# Patient Record
Sex: FEMALE | Race: WHITE | NOT HISPANIC OR LATINO | Employment: FULL TIME | URBAN - NONMETROPOLITAN AREA
[De-identification: names, ages, dates, MRNs, and addresses within clinical notes are randomized per-mention and may not be internally consistent; named-entity substitution may affect disease eponyms.]

---

## 2018-10-19 ENCOUNTER — OFFICE VISIT (OUTPATIENT)
Dept: FAMILY MEDICINE | Facility: OTHER | Age: 43
End: 2018-10-19
Payer: COMMERCIAL

## 2018-10-19 VITALS
DIASTOLIC BLOOD PRESSURE: 80 MMHG | SYSTOLIC BLOOD PRESSURE: 136 MMHG | RESPIRATION RATE: 20 BRPM | TEMPERATURE: 96.1 F | WEIGHT: 293 LBS | HEART RATE: 72 BPM

## 2018-10-19 DIAGNOSIS — E11.9 TYPE 2 DIABETES MELLITUS WITHOUT COMPLICATION, WITHOUT LONG-TERM CURRENT USE OF INSULIN (H): Primary | ICD-10-CM

## 2018-10-19 DIAGNOSIS — Z23 NEED FOR PROPHYLACTIC VACCINATION AND INOCULATION AGAINST INFLUENZA: ICD-10-CM

## 2018-10-19 DIAGNOSIS — R20.0 NUMBNESS AND TINGLING OF FOOT: ICD-10-CM

## 2018-10-19 DIAGNOSIS — R20.2 NUMBNESS AND TINGLING OF FOOT: ICD-10-CM

## 2018-10-19 DIAGNOSIS — E66.01 MORBID OBESITY (H): ICD-10-CM

## 2018-10-19 LAB
ANION GAP SERPL CALCULATED.3IONS-SCNC: 9 MMOL/L (ref 3–14)
BASOPHILS # BLD AUTO: 0.1 10E9/L (ref 0–0.2)
BASOPHILS NFR BLD AUTO: 0.5 %
BUN SERPL-MCNC: 10 MG/DL (ref 7–30)
CALCIUM SERPL-MCNC: 9.4 MG/DL (ref 8.5–10.1)
CHLORIDE SERPL-SCNC: 103 MMOL/L (ref 94–109)
CHOLEST SERPL-MCNC: 149 MG/DL
CO2 SERPL-SCNC: 26 MMOL/L (ref 20–32)
CREAT SERPL-MCNC: 0.71 MG/DL (ref 0.52–1.04)
DIFFERENTIAL METHOD BLD: NORMAL
EOSINOPHIL # BLD AUTO: 0.5 10E9/L (ref 0–0.7)
EOSINOPHIL NFR BLD AUTO: 4.6 %
ERYTHROCYTE [DISTWIDTH] IN BLOOD BY AUTOMATED COUNT: 13.6 % (ref 10–15)
FOLATE SERPL-MCNC: 13 NG/ML
GFR SERPL CREATININE-BSD FRML MDRD: 89 ML/MIN/1.7M2
GLUCOSE SERPL-MCNC: 245 MG/DL (ref 70–99)
HBA1C MFR BLD: 10.4 % (ref 0–5.6)
HCT VFR BLD AUTO: 42.9 % (ref 35–47)
HDLC SERPL-MCNC: 42 MG/DL
HGB BLD-MCNC: 14 G/DL (ref 11.7–15.7)
LDLC SERPL CALC-MCNC: 73 MG/DL
LYMPHOCYTES # BLD AUTO: 2.9 10E9/L (ref 0.8–5.3)
LYMPHOCYTES NFR BLD AUTO: 27.4 %
MCH RBC QN AUTO: 28.4 PG (ref 26.5–33)
MCHC RBC AUTO-ENTMCNC: 32.6 G/DL (ref 31.5–36.5)
MCV RBC AUTO: 87 FL (ref 78–100)
MONOCYTES # BLD AUTO: 0.6 10E9/L (ref 0–1.3)
MONOCYTES NFR BLD AUTO: 5.3 %
NEUTROPHILS # BLD AUTO: 6.5 10E9/L (ref 1.6–8.3)
NEUTROPHILS NFR BLD AUTO: 62.2 %
NONHDLC SERPL-MCNC: 107 MG/DL
PLATELET # BLD AUTO: 399 10E9/L (ref 150–450)
POTASSIUM SERPL-SCNC: 3.8 MMOL/L (ref 3.4–5.3)
RBC # BLD AUTO: 4.93 10E12/L (ref 3.8–5.2)
SODIUM SERPL-SCNC: 138 MMOL/L (ref 133–144)
TRIGL SERPL-MCNC: 168 MG/DL
TSH SERPL DL<=0.005 MIU/L-ACNC: 2.61 MU/L (ref 0.4–4)
VIT B12 SERPL-MCNC: 258 PG/ML (ref 193–986)
WBC # BLD AUTO: 10.5 10E9/L (ref 4–11)

## 2018-10-19 PROCEDURE — 82746 ASSAY OF FOLIC ACID SERUM: CPT | Performed by: PHYSICIAN ASSISTANT

## 2018-10-19 PROCEDURE — 82607 VITAMIN B-12: CPT | Performed by: PHYSICIAN ASSISTANT

## 2018-10-19 PROCEDURE — 84443 ASSAY THYROID STIM HORMONE: CPT | Performed by: PHYSICIAN ASSISTANT

## 2018-10-19 PROCEDURE — 99204 OFFICE O/P NEW MOD 45 MIN: CPT | Mod: 25 | Performed by: PHYSICIAN ASSISTANT

## 2018-10-19 PROCEDURE — 83036 HEMOGLOBIN GLYCOSYLATED A1C: CPT | Performed by: PHYSICIAN ASSISTANT

## 2018-10-19 PROCEDURE — 36415 COLL VENOUS BLD VENIPUNCTURE: CPT | Performed by: PHYSICIAN ASSISTANT

## 2018-10-19 PROCEDURE — 90471 IMMUNIZATION ADMIN: CPT | Performed by: PHYSICIAN ASSISTANT

## 2018-10-19 PROCEDURE — 80048 BASIC METABOLIC PNL TOTAL CA: CPT | Performed by: PHYSICIAN ASSISTANT

## 2018-10-19 PROCEDURE — 80061 LIPID PANEL: CPT | Performed by: PHYSICIAN ASSISTANT

## 2018-10-19 PROCEDURE — 90686 IIV4 VACC NO PRSV 0.5 ML IM: CPT | Performed by: PHYSICIAN ASSISTANT

## 2018-10-19 PROCEDURE — 85025 COMPLETE CBC W/AUTO DIFF WBC: CPT | Performed by: PHYSICIAN ASSISTANT

## 2018-10-19 RX ORDER — METFORMIN HYDROCHLORIDE 500 MG/5ML
SOLUTION ORAL
COMMUNITY
End: 2018-10-19

## 2018-10-19 RX ORDER — METFORMIN HCL 500 MG
TABLET, EXTENDED RELEASE 24 HR ORAL
COMMUNITY
End: 2018-10-22

## 2018-10-19 RX ORDER — METHOCARBAMOL 500 MG/1
TABLET, FILM COATED ORAL 4 TIMES DAILY PRN
COMMUNITY
End: 2018-10-22

## 2018-10-19 RX ORDER — GLYBURIDE 2.5 MG/1
2.5 TABLET ORAL 2 TIMES DAILY WITH MEALS
COMMUNITY
End: 2018-11-16

## 2018-10-19 ASSESSMENT — PAIN SCALES - GENERAL: PAINLEVEL: MILD PAIN (2)

## 2018-10-19 NOTE — NURSING NOTE
Health Maintenance Due   Topic Date Due     PHQ-2 Q1 YR  12/29/1985     TETANUS IMMUNIZATION (SYSTEM ASSIGNED)  12/29/1991     HIV SCREEN (SYSTEM ASSIGNED)  12/29/1991     PAP SCREENING Q3 YR (SYSTEM ASSIGNED)  08/24/2004     INFLUENZA VACCINE (1) 09/01/2018     Domitila GEORGE LPN

## 2018-10-19 NOTE — MR AVS SNAPSHOT
After Visit Summary   10/19/2018    Brittney Moon    MRN: [id number]           Patient Information     Date Of Birth          1975        Visit Information        Provider Department      10/19/2018 3:40 PM Pelon Cotter PA-C [facility]        Today's Diagnoses     Type 2 diabetes mellitus without complication, without long-term current use of insulin (H)    -  1    Numbness and tingling of foot          Care Instructions      Diet: Diabetes  Food is an important tool that you can use to control diabetes and stay healthy. Eating well-balanced meals in the correct amounts will help you control your blood glucose levels and prevent low blood sugar reactions. It will also help you reduce the health risks of diabetes. There is no one specific diet that is right for everyone with diabetes. But there are general guidelines to follow. A registered dietitian (RD) will create a tailored diet approach that s just right for you. He or she will also help you plan healthy meals and snacks. If you have any questions, call your dietitian for advice.     Guidelines for success  Talk with your healthcare provider before starting a diabetes diet or weight loss program. If you haven't talked with a dietitian yet, ask your provider for a referral. The following guidelines can help you succeed:    Select foods from the 6 food groups below. Your dietitian will help you find food choices within each group. He or she will also show you serving sizes and how many servings you can have at each meal.  ? Grains, beans, and starchy vegetables  ? Vegetables  ? Fruit  ? Milk or yogurt  ? Meat, poultry, fish, or tofu  ? Healthy fats    Check your blood sugar levels as directed by your provider. Take any medicine as prescribed by your provider.    Learn to read food labels and pick the right portion sizes.    Eat only the amount of food in your meal plan. Eat about the same amount of food at regular times each  day. Don t skip meals. Eat meals 4 to 5 hours apart, with snacks in between.    Limit alcohol. It raises blood sugar levels. Drink water or calorie-free diet drinks that use safe sweeteners.    Eat less fat to help lower your risk of heart disease. Use nonfat or low-fat dairy products and lean meats. Avoid fried foods. Use cooking oils that are unsaturated, such as olive, canola, or peanut oil.    Talk with your dietitian about safe sugar substitutes.    Avoid added salt. It can contribute to high blood pressure, which can cause heart disease. People with diabetes already have a risk of high blood pressure and heart disease.    Stay at a healthy weight. If you need to lose weight, cut down on your portion sizes. But don t skip meals. Exercise is an important part of any weight management program. Talk with your provider about an exercise program that s right for you.    For more information about the best diet plan for you, talk with a registered dietitian (RD). To find an RD in your area, contact:  ? Academy of Nutrition and Dietetics www.eatright.org  ? The American Diabetes Association 068-825-2199 www.diabetes.org  Date Last Reviewed: 8/1/2016 2000-2017 [employer]. [address] [place] [address], [place] [address]. All rights reserved. This information is not intended as a substitute for professional medical care. Always follow your healthcare professional's instructions.        Eating Out When You Have Diabetes  Eating right is an important part of keeping your blood sugar in your target range. You just need to make healthy choices.    Tips for restaurant meals  When you eat away from home try these tips:    Try to schedule your dining-out meal at your normal meal time. Make a reservation if possible, so you don't have to wait to eat. If you can't make a reservation, try to arrive at the restaurant at a less-busy time to cut down your wait time. Eat a small fruit or starch snack at your regular  mealtime if your restaurant meal is going to be later than usual.     Call ahead to see if the restaurant can meet your dietary needs if you've never been there before. Or you can go online to see the menu ahead of time.    Carry some crackers with you in case the restaurant needs you to wait until you can be served.    Ask how foods are prepared before you order.    Instead of fried, sautéed, or breaded foods, choose ones that are broiled, steamed, grilled, or baked.    Ask for sauces, gravies, and dressings on the side.    Only eat an amount that fits your meal plan. Remember: You can take home the leftovers.    Save dessert for special occasions. Then choose a small dessert or share one with a friend or family member.  Make healthy choices  Fast food    Garden salad with light dressing on the side    Baked potato with vegetables or herbs    Broiled, roasted, or grilled chicken sandwich    Sliced turkey or lean roast beef sandwich  Mexican    Chicken enchilada, without cheese or sour cream     Small burrito with whole beans and chicken    Whole beans (not refried) and rice    Chicken or fish fajitas  Steakhouse    Grilled or broiled lean cuts of beef    Baked potato with vegetables or herbs    Broiled or baked chicken. Don t eat the skin.    Steamed vegetables  Asian    Steamed dumplings or potstickers    Broiled, boiled, or steamed meats or fish    Sushi or sashimi    Steamed rice or boiled noodles. One serving is equal to 1/3 cup.  Date Last Reviewed: 6/1/2016 2000-2017 [employer] [address] [place] [address], [place] [address]. All rights reserved. This information is not intended as a substitute for professional medical care. Always follow your healthcare professional's instructions.        Healthy Meals for Diabetes     A healthcare provider will help you develop a meal plan that fits your needs.   Ask your healthcare team to help you make a meal plan that fits your needs. Your meal plan tells  you when to eat your meals and snacks, what kinds of foods to eat, and how much of each food to eat. You don t have to give up all the foods you like. But you do need to follow some guidelines.  Choose healthy carbohydrates  Starches, sugars, and fiber are all types of carbohydrates. Fiber can help lower your cholesterol and triglycerides. Fiber is also healthy for your heart. You should have 20 to 35 grams of total fiber each day. Fiber-rich foods include:    Whole-grain breads and cereals    Bulgur wheat    Brown rice       Whole-wheat pasta    Fruits and vegetables    Dry beans, and peas   Keep track of the amount of carbohydrates you eat. This can help you keep the right balance of physical activity and medicine. The amount of carbohydrates needed will vary for each person. It depends on many things such as your health, the medicines you take, and how active you are. Your healthcare team will help you figure out the right amount of carbohydrates for you. You may start with around 45 to 60 grams of carbohydrates per meal, depending on your situation.   Here are some examples of foods containing about 15 grams of carbohydrates (1 serving of carbohydrates):    1/2 cup of canned or frozen fruit    A small piece of fresh fruit (4 ounces)    1 slice of bread    1/2 cup of oatmeal    1/3 cup of rice    4 to 6 crackers    1/2 English muffin    1/2 cup of black beans    1/4 of a large baked potato (3 ounces)    2/3 cup of plain fat-free yogurt    1 cup of soup    1/2 cup of casserole    6 chicken nuggets    2-inch-square brownie or cake without frosting    2 small cookies    1/2 cup of ice cream or sherbet  Choose healthy protein foods  Eating protein that is low in fat can help you control your weight. It also helps keep your heart healthy. Low-fat protein foods include:    Fish    Plant proteins, such as dry beans and peas, nuts, and soy products like tofu and soymilk    Lean meat with all visible fat  removed    Poultry with the skin removed    Low-fat or nonfat milk, cheese, and yogurt  Limit unhealthy fats and sugar  Saturated and trans fats are unhealthy for your heart. They raise LDL (bad) cholesterol. Fat is also high in calories, so it can make you gain weight. To cut down on unhealthy fats and sugar, limit these foods:    Butter or margarine    Palm and palm kernel oils and coconut oil    Cream    Cheese    Yip    Lunch meats       Ice cream    Sweet bakery goods such as pies, muffins, and donuts    Jams and jellies    Candy bars    Regular sodas   How much to eat  The amount of food you eat affects your blood sugar. It also affects your weight. Your healthcare team will tell you how much of each type of food you should eat.    Use measuring cups and spoons and a food scale to measure serving sizes.    Learn what a correct serving size looks like on your plate. This will help when you are away from home and can t measure your servings.    Eat only the number of servings given on your meal plan for each food. Don t take seconds.    Learn to read food labels. Be sure to look at serving size, total carbohydrates, fiber, calories, sugar, and saturated and trans fats. Look for healthier alternatives to foods that have added sugar.    Plan ahead for parties so you can still have a good time without going overboard with unhealthy food choices. Set a good example yourself by bringing a healthy dish to pot lucks.   Choose healthy snacks  When it comes to snacks, we usually think about foods with added sugar and fats. But there are many other options for healthier snack choices. Here are a few snack ideas to choose from:  Snacks with less than 5 grams of carbohydrates    1 piece of string cheese    3 celery sticks plus 1 tablespoon of peanut butter    5 cherry tomatoes plus 1 tablespoon of ranch dressing    1 hard-boiled egg    1/4 cup of fresh blueberries     5 baby carrots    1 cup of light popcorn    1/2 cup of  sugar-free gelatin    15 almonds  Snacks with about 10 to 20 grams of carbohydrates    1/3 cup of hummus plus 1 cup of fresh cut nonstarchy vegetables (carrots, green peppers, broccoli, celery, or a combination)    1/2 cup of fresh or canned fruit plus 1/4 cup of cottage cheese    1/2 cup of tuna salad with 4 crackers    2 rice cakes and a tablespoon of peanut butter    1 small apple or orange    3 cups light popcorn    1/2 of a turkey sandwich (1 slice of whole-wheat bread, 2 ounces of turkey, and mustard)  Portion sizes are important to controlling your blood sugar and staying at a healthy weight. Stock up on healthy snack items so you always have them on hand.  When to eat  Your meal plan will likely include breakfast, lunch, dinner, and some snacks.    Try to eat your meals and snacks at about the same times each day.    Eat all your meals and snacks. Skipping a meal or snack can make your blood sugar drop too low. It can also cause you to eat too much at the next meal or snack. Then your blood sugar could get too high.  Date Last Reviewed: 7/1/2016 2000-2017 [employer]. [address] [place] [address], [place] [address]. All rights reserved. This information is not intended as a substitute for professional medical care. Always follow your healthcare professional's instructions.        Understanding Carbohydrates    A car needs the right type of fuel to run. And you need the right kind of food to function. To keep your energy level up, your body needs food that has carbohydrates. But carbohydrates raise blood sugar levels higher and faster than other kinds of food. Your dietitian will work with you to figure out the amount of carbohydrates you need.  Starches  Starches are found in grains, some vegetables, and beans. Grain products include bread, pasta, cereal, and tortillas. Starchy vegetables include potatoes, peas, corn, lima beans, yams, and squash. Kidney beans, handley beans, black beans, garbanzo  beans, and lentils also contain starches.  Sugars  Sugars are found naturally in many foods. Or sugar can be added. Foods that contain natural sugar include fruits and fruit juices, dairy products, honey, and molasses. Added sugars are found in most desserts, processed foods, candy, regular soda, and fruit drinks. These are very helpful for treating low blood sugar, or hypoglycemia. They provide sugar quickly. Try to keep at least 15 to 20 grams of these simple sugars with you at all times. Eat this if you begin to have low blood sugar symptoms.  Fiber  Fiber comes from plant foods. Most fiber isn t digested by the body. Instead of raising blood sugar levels like other carbohydrates, it actually stops blood sugar from rising too fast. Fiber is found in fruits, vegetables, whole grains, beans, peas, and many nuts.  Carb counting  Keep track of the amount of carbohydrates you eat. This can help you keep the right balance of physical activity and medicine. The amount of carbohydrates needed will vary for each person. It depends on many things such as your health, the medicines you take, and how active you are. Your healthcare team will help you figure out the right amount of carbohydrates for you. You may start with around 45 to 60 grams of carbohydrate per meal, depending on your situation. Carb counting is a system that helps you keep track of the carbohydrates you eat at each meal.  Carbohydrates come from a variety of foods. These include grains, starchy vegetables, fruit, milk, beans, and snack foods. You can either count carbohydrate grams or carbohydrate servings. When you count carbohydrate servings, 1 carbohydrate serving = 15 grams of carbohydrates.  Here are some examples of foods containing about 15 grams of carbohydrates (1 serving of carbohydrates):    1/2 cup of canned or frozen fruit    A small piece of fresh fruit (4 ounces)    1 slice of bread    1/2 cup of oatmeal    1/3 cup of rice    4 to 6  crackers    1/2 English muffin    1/2 cup of black beans    1/4 of a large baked potato (3 ounces)    2/3 cup of plain fat-free yogurt    1 cup of soup    1/2 cup of casserole    6 chicken nuggets    2-inch-square brownie or cake without frosting    2 small cookies    1/2 cup of ice cream or sherbet  Carb counting is easier when food labels are available. Look at the label to see how many grams of total carbohydrates the food contains. Then you can figure out how much you should eat.  Two very important lines to look at on the label are the serving size and the total carbohydrate amount. Here are some tips for using food labels to count your carbohydrate intake:    Check the serving size. The information on the label is based on that serving size. If you eat more than the listed serving size, you may have to double or triple the other information on the label.     Check the total grams of carbohydrates. Total carbohydrate from the label includes sugar, starch, and fiber. Be sure to use the total carbohydrate number and not sugar alone.    Know how many grams of carbohydrates you can have.  Be familiar with the matching portion sizes.    Compare labels. Compare the labels of different products, looking at serving sizes and total carbohydrates to find the products that work best for you.     Don't forget protein and fat. With all the focus on carb counting, it might be easy to forget protein and fat in your meals. Don't forget to include sources of protein and healthy fat to balance your meals.  It s also important to be consistent with the amount and time you eat when taking a fixed dose of diabetes medicine. Work with your healthcare provider or dietitian if you need additional help. He or she can help you keep track of your carbohydrate intake. He or she can also help you figure out how many grams of carbohydrates you should have.  Date Last Reviewed: 7/1/2016 2000-2017 [employer]. [address] [place] [address]  "[place] [address]. All rights reserved. This information is not intended as a substitute for professional medical care. Always follow your healthcare professional's instructions.                Follow-ups after your visit        Who to contact     If you have questions or need follow up information about today's clinic visit or your schedule please contact [facility] directly at [phone].  Normal or non-critical lab and imaging results will be communicated to you by MyChart, letter or phone within 4 business days after the clinic has received the results. If you do not hear from us within 7 days, please contact the clinic through [facility] or phone. If you have a critical or abnormal lab result, we will notify you by phone as soon as possible.  Submit refill requests through [facility] or call your pharmacy and they will forward the refill request to us. Please allow 3 business days for your refill to be completed.          Additional Information About Your Visit        MyChart Information     [facility] lets you send messages to your doctor, view your test results, renew your prescriptions, schedule appointments and more. To sign up, go to [email or web address] . Click on \"Log in\" on the left side of the screen, which will take you to the Welcome page. Then click on \"Sign up Now\" on the right side of the page.     You will be asked to enter the access code listed below, as well as some personal information. Please follow the directions to create your username and password.     Your access code is: [email or web address]  Expires: 2019  3:33 PM     Your access code will  in 90 days. If you need help or a new code, please call your [facility] or [phone].        Care EveryWhere ID     This is your Care EveryWhere ID. This could be used by other organizations to access your [facility] medical records  [id number]        Your Vitals Were     Pulse Temperature Respirations Last Period    "       72 96.1  F (35.6  C) (Oral) 20 04/23/2018         Blood Pressure from Last 3 Encounters:   10/19/18 136/80   08/08/01 116/78   03/29/01 146/88    Weight from Last 3 Encounters:   10/19/18 317 lb 3.2 oz (143.9 kg)   08/08/01 260 lb (117.9 kg)   03/29/01 240 lb (108.9 kg)              We Performed the Following     Basic metabolic panel  (Ca, Cl, CO2, Creat, Gluc, K, Na, BUN)     CBC with platelets differential     Folate     Hemoglobin A1c     Lipid Profile     TSH with free T4 reflex     Vitamin B12        Primary Care Provider Fax #    Physician No Ref-Primary [phone]       No address on file        Equal Access to Services     JER BYERS : Cheryl Camarena, da gomez, jason kaalmamoises kirkpatrick, darshan edgar. So [facility] [phone].    ATENCIÓN: Si habla español, tiene a zacarias disposición servicios gratuitos de asistencia lingüística. Llame al [phone].    We comply with applicable federal civil rights laws and [place] laws. We do not discriminate on the basis of race, color, national origin, age, disability, sex, sexual orientation, or gender identity.            Thank you!     Thank you for choosing [facility]  for your care. Our goal is always to provide you with excellent care. Hearing back from our patients is one way we can continue to improve our services. Please take a few minutes to complete the written survey that you may receive in the mail after your visit with us. Thank you!             Your Updated Medication List - Protect others around you: Learn how to safely use, store and throw away your medicines at www.disposemymeds.org.          This list is accurate as of 10/19/18  4:28 PM.  Always use your most recent med list.                   Brand Name Dispense Instructions for use Diagnosis    glyBURIDE 2.5 MG tablet    DIABETA /MICRONASE     Take 2.5 mg by mouth 2 times daily (with meals)    Type 2 diabetes mellitus without  complication, without long-term current use of insulin (H)       metFORMIN 500 MG 24 hr tablet    GLUCOPHAGE-XR     2 tablets twice daily    Type 2 diabetes mellitus without complication, without long-term current use of insulin (H)       methocarbamol 500 MG tablet    ROBAXIN     Take by mouth 4 times daily as needed for muscle spasms 1-3 tablets daily    Type 2 diabetes mellitus without complication, without long-term current use of insulin (H)

## 2018-10-19 NOTE — PROGRESS NOTES
SUBJECTIVE:   Brittney Moon is a 42 year old female who presents to clinic today for the following health issues:      New Patient/Transfer of Care    Patient is a 42 year old female who presents to [facility]. She was previously living in [place], but moved back to the area to be closer to family. 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 She has worn prosthetic in the past with difficulty, citing pressure sores as the reason she is not currently using one. Patient is in motorized wheel chair presently. She is currently being treated for T2DM and muscle pain from sitting in a chair. Patient is obese and recognizes the importance of reducing weight. She says that her mother and sister are both very supportive of helping her improve her diet. She lives with her uncle who is very resistant to eating vegetables which impacts her diet. We discussed making meals for herself and storing leftovers. Patient is experiencing numbness/tingling in the top of her right foot. It is unclear as to whether this is from sitting in a chair or due to polyneuropathy from the diabetes. We will update labs today.     Problem list and histories reviewed & adjusted, as indicated.  Additional history: as documented    There is no problem list on file for this patient.    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 No pertinent family history.      Current Outpatient Prescriptions   Medication Sig Dispense Refill     glyBURIDE (DIABETA /MICRONASE) 2.5 MG tablet Take 2.5 mg by mouth 2 times daily (with meals)       metFORMIN (GLUCOPHAGE-XR) 500 MG 24 hr tablet 2 tablets twice daily       methocarbamol (ROBAXIN) 500 MG  tablet Take by mouth 4 times daily as needed for muscle spasms 1-3 tablets daily       Allergies   Allergen Reactions     Codeine      Labs reviewed in EPIC    Reviewed and updated as needed this visit by clinical staff  Tobacco  Allergies  Meds  Med Hx  Surg Hx  Fam Hx  Soc Hx      Reviewed and updated as needed this visit by Provider         ROS:  Constitutional, HEENT, cardiovascular, pulmonary, gi and gu systems are negative, except as otherwise noted.    OBJECTIVE:     /80 (BP Location: Right arm, Patient Position: Chair, Cuff Size: Adult Large)  Pulse 72  Temp 96.1  F (35.6  C) (Oral)  Resp 20  Wt 317 lb 3.2 oz (143.9 kg)  LMP 04/23/2018  There is no height or weight on file to calculate BMI.  GENERAL: healthy, alert and no distress  RESP: lungs clear to auscultation - no rales, rhonchi or wheezes  CV: regular rate and rhythm, normal S1 S2, no S3 or S4, no murmur, click or rub, no peripheral edema and peripheral pulses strong  ABDOMEN: obese, nontender, no hepatosplenomegaly, no masses and bowel sounds normal  MS: no gross musculoskeletal defects noted, no edema, left leg amputated  SKIN: no suspicious lesions or rashes  NEURO: Normal strength and tone, mentation intact and speech normal, unable to perceive sharp vs. Dull along toes of right foot  PSYCH: mentation appears normal, affect normal/bright    Diagnostic Test Results:  Results for orders placed or performed in visit on 10/19/18   CBC with platelets differential   Result Value Ref Range    WBC 10.5 4.0 - 11.0 10e9/L    RBC Count 4.93 3.8 - 5.2 10e12/L    Hemoglobin 14.0 11.7 - 15.7 g/dL    Hematocrit 42.9 35.0 - 47.0 %    MCV 87 78 - 100 fl    MCH 28.4 26.5 - 33.0 pg    MCHC 32.6 31.5 - 36.5 g/dL    RDW 13.6 10.0 - 15.0 %    Platelet Count 399 150 - 450 10e9/L    % Neutrophils 62.2 %    % Lymphocytes 27.4 %    % Monocytes 5.3 %    % Eosinophils 4.6 %    % Basophils 0.5 %    Absolute Neutrophil 6.5 1.6 - 8.3 10e9/L    Absolute  Lymphocytes 2.9 0.8 - 5.3 10e9/L    Absolute Monocytes 0.6 0.0 - 1.3 10e9/L    Absolute Eosinophils 0.5 0.0 - 0.7 10e9/L    Absolute Basophils 0.1 0.0 - 0.2 10e9/L    Diff Method Automated Method    Vitamin B12   Result Value Ref Range    Vitamin B12 258 193 - 986 pg/mL   Folate   Result Value Ref Range    Folate 13.0 >5.4 ng/mL   TSH with free T4 reflex   Result Value Ref Range    TSH 2.61 0.40 - 4.00 mU/L   Hemoglobin A1c   Result Value Ref Range    Hemoglobin A1C 10.4 (H) 0 - 5.6 %   Basic metabolic panel  (Ca, Cl, CO2, Creat, Gluc, K, Na, BUN)   Result Value Ref Range    Sodium 138 133 - 144 mmol/L    Potassium 3.8 3.4 - 5.3 mmol/L    Chloride 103 94 - 109 mmol/L    Carbon Dioxide 26 20 - 32 mmol/L    Anion Gap 9 3 - 14 mmol/L    Glucose 245 (H) 70 - 99 mg/dL    Urea Nitrogen 10 7 - 30 mg/dL    Creatinine 0.71 0.52 - 1.04 mg/dL    GFR Estimate 89 >60 mL/min/1.7m2    GFR Estimate If Black >90 >60 mL/min/1.7m2    Calcium 9.4 8.5 - 10.1 mg/dL   Lipid Profile   Result Value Ref Range    Cholesterol 149 <200 mg/dL    Triglycerides 168 (H) <150 mg/dL    HDL Cholesterol 42 (L) >49 mg/dL    LDL Cholesterol Calculated 73 <100 mg/dL    Non HDL Cholesterol 107 <130 mg/dL       ASSESSMENT/PLAN:       1. Type 2 diabetes mellitus without complication, without long-term current use of insulin (H)  Patient informed of elevated A1c. Referral for diabetic education placed. Increase Glyburide from 2.5mg twice daily to 5mg twice daily. Patient should follow up in 3 months for recheck.   - methocarbamol (ROBAXIN) 500 MG tablet; Take by mouth 4 times daily as needed for muscle spasms 1-3 tablets daily  - glyBURIDE (DIABETA /MICRONASE) 2.5 MG tablet; Take 2.5 mg by mouth 2 times daily (with meals)  - metFORMIN (GLUCOPHAGE-XR) 500 MG 24 hr tablet; 2 tablets twice daily  - CBC with platelets differential  - Vitamin B12  - Folate  - TSH with free T4 reflex  - Hemoglobin A1c  - Basic metabolic panel  (Ca, Cl, CO2, Creat, Gluc, K, Na,  BUN)  - Lipid Profile  - blood glucose monitoring (NO BRAND SPECIFIED) meter device kit; Use to test blood sugar 1-2 times daily or as directed.  Dispense: 1 kit; Refill: 0  - blood glucose monitoring (NO BRAND SPECIFIED) test strip; Use to test blood sugar 1-2 times daily or as directed. To accompany: Blood Glucose Monitor Brands: per insurance.  Dispense: 100 strip; Refill: 6  - blood glucose calibration (NO BRAND SPECIFIED) solution; Use to calibrate blood glucose monitor as needed as directed. To accompany: Blood Glucose Monitor Brands: per insurance.  Dispense: 1 Bottle; Refill: 3  - thin (NO BRAND SPECIFIED) lancets; Use to test blood sugar 1-2 times daily or as directed. To accompany: Blood Glucose Monitor Brands: per insurance.  Dispense: 100 each; Refill: 1  - alcohol swab prep pads; Use to swab area of injection/tez as directed  Dispense: 100 each; Refill: 3  - DIABETES EDUCATOR REFERRAL  - glyBURIDE (DIABETA /MICRONASE) 5 MG tablet; Take 1 tablet (5 mg) by mouth 2 times daily (with meals)  Dispense: 90 tablet; Refill: 1    2. Numbness and tingling of foot  Suspect that the tingling is due to neuropathy from poorly controlled diabetes. We will check labs and notify the patient of the results.   - Vitamin B12  - Folate    3. Morbid obesity (H)  Discussed with patient consideration for pool exercise or PT to help manage weight. Diabetic education referral placed to help patient better understand diet.     4. Need for prophylactic vaccination and inoculation against influenza  Given without issue. Information sent home with patient.   - Vaccine Administration, Initial [38172]  - FLU VACCINE, SPLIT VIRUS, IM (QUADRIVALENT) [47999]- >3 YRS    Follow up with clinic 3 months or sooner if conditions change, worsen or fail to improve as expected.      Pelon Cotter PA-C  [facility]      Injectable Influenza Immunization Documentation    1.  Is the person to be vaccinated sick today?   No    2.  Does the person to be vaccinated have an allergy to a component   of the vaccine?   No  Egg Allergy Algorithm Link    3. Has the person to be vaccinated ever had a serious reaction   to influenza vaccine in the past?   No    4. Has the person to be vaccinated ever had Guillain-Barré syndrome?   No    Form completed by Domitila GEORGE LPN

## 2018-10-19 NOTE — PATIENT INSTRUCTIONS
Diet: Diabetes  Food is an important tool that you can use to control diabetes and stay healthy. Eating well-balanced meals in the correct amounts will help you control your blood glucose levels and prevent low blood sugar reactions. It will also help you reduce the health risks of diabetes. There is no one specific diet that is right for everyone with diabetes. But there are general guidelines to follow. A registered dietitian (RD) will create a tailored diet approach that s just right for you. He or she will also help you plan healthy meals and snacks. If you have any questions, call your dietitian for advice.     Guidelines for success  Talk with your healthcare provider before starting a diabetes diet or weight loss program. If you haven't talked with a dietitian yet, ask your provider for a referral. The following guidelines can help you succeed:    Select foods from the 6 food groups below. Your dietitian will help you find food choices within each group. He or she will also show you serving sizes and how many servings you can have at each meal.  ? Grains, beans, and starchy vegetables  ? Vegetables  ? Fruit  ? Milk or yogurt  ? Meat, poultry, fish, or tofu  ? Healthy fats    Check your blood sugar levels as directed by your provider. Take any medicine as prescribed by your provider.    Learn to read food labels and pick the right portion sizes.    Eat only the amount of food in your meal plan. Eat about the same amount of food at regular times each day. Don t skip meals. Eat meals 4 to 5 hours apart, with snacks in between.    Limit alcohol. It raises blood sugar levels. Drink water or calorie-free diet drinks that use safe sweeteners.    Eat less fat to help lower your risk of heart disease. Use nonfat or low-fat dairy products and lean meats. Avoid fried foods. Use cooking oils that are unsaturated, such as olive, canola, or peanut oil.    Talk with your dietitian about safe sugar substitutes.    Avoid  added salt. It can contribute to high blood pressure, which can cause heart disease. People with diabetes already have a risk of high blood pressure and heart disease.    Stay at a healthy weight. If you need to lose weight, cut down on your portion sizes. But don t skip meals. Exercise is an important part of any weight management program. Talk with your provider about an exercise program that s right for you.    For more information about the best diet plan for you, talk with a registered dietitian (RD). To find an RD in your area, contact:  ? Academy of Nutrition and Dietetics www.eatright.org  ? The American Diabetes Association 954-420-2474 www.diabetes.org  Date Last Reviewed: 8/1/2016 2000-2017 [employer]. [address] [place] [address], [place] [address]. All rights reserved. This information is not intended as a substitute for professional medical care. Always follow your healthcare professional's instructions.        Eating Out When You Have Diabetes  Eating right is an important part of keeping your blood sugar in your target range. You just need to make healthy choices.    Tips for restaurant meals  When you eat away from home try these tips:    Try to schedule your dining-out meal at your normal meal time. Make a reservation if possible, so you don't have to wait to eat. If you can't make a reservation, try to arrive at the restaurant at a less-busy time to cut down your wait time. Eat a small fruit or starch snack at your regular mealtime if your restaurant meal is going to be later than usual.     Call ahead to see if the restaurant can meet your dietary needs if you've never been there before. Or you can go online to see the menu ahead of time.    Carry some crackers with you in case the restaurant needs you to wait until you can be served.    Ask how foods are prepared before you order.    Instead of fried, sautéed, or breaded foods, choose ones that are broiled, steamed, grilled, or  baked.    Ask for sauces, gravies, and dressings on the side.    Only eat an amount that fits your meal plan. Remember: You can take home the leftovers.    Save dessert for special occasions. Then choose a small dessert or share one with a friend or family member.  Make healthy choices  Fast food    Garden salad with light dressing on the side    Baked potato with vegetables or herbs    Broiled, roasted, or grilled chicken sandwich    Sliced turkey or lean roast beef sandwich  Mexican    Chicken enchilada, without cheese or sour cream     Small burrito with whole beans and chicken    Whole beans (not refried) and rice    Chicken or fish fajitas  Steakhouse    Grilled or broiled lean cuts of beef    Baked potato with vegetables or herbs    Broiled or baked chicken. Don t eat the skin.    Steamed vegetables  Asian    Steamed dumplings or potstickers    Broiled, boiled, or steamed meats or fish    Sushi or sashimi    Steamed rice or boiled noodles. One serving is equal to 1/3 cup.  Date Last Reviewed: 6/1/2016 2000-2017 [employer]. [address] [place] [address]. All rights reserved. This information is not intended as a substitute for professional medical care. Always follow your healthcare professional's instructions.        Healthy Meals for Diabetes     A healthcare provider will help you develop a meal plan that fits your needs.   Ask your healthcare team to help you make a meal plan that fits your needs. Your meal plan tells you when to eat your meals and snacks, what kinds of foods to eat, and how much of each food to eat. You don t have to give up all the foods you like. But you do need to follow some guidelines.  Choose healthy carbohydrates  Starches, sugars, and fiber are all types of carbohydrates. Fiber can help lower your cholesterol and triglycerides. Fiber is also healthy for your heart. You should have 20 to 35 grams of total fiber each day. Fiber-rich foods include:     Whole-grain breads and cereals    Bulgur wheat    Brown rice       Whole-wheat pasta    Fruits and vegetables    Dry beans, and peas   Keep track of the amount of carbohydrates you eat. This can help you keep the right balance of physical activity and medicine. The amount of carbohydrates needed will vary for each person. It depends on many things such as your health, the medicines you take, and how active you are. Your healthcare team will help you figure out the right amount of carbohydrates for you. You may start with around 45 to 60 grams of carbohydrates per meal, depending on your situation.   Here are some examples of foods containing about 15 grams of carbohydrates (1 serving of carbohydrates):    1/2 cup of canned or frozen fruit    A small piece of fresh fruit (4 ounces)    1 slice of bread    1/2 cup of oatmeal    1/3 cup of rice    4 to 6 crackers    1/2 English muffin    1/2 cup of black beans    1/4 of a large baked potato (3 ounces)    2/3 cup of plain fat-free yogurt    1 cup of soup    1/2 cup of casserole    6 chicken nuggets    2-inch-square brownie or cake without frosting    2 small cookies    1/2 cup of ice cream or sherbet  Choose healthy protein foods  Eating protein that is low in fat can help you control your weight. It also helps keep your heart healthy. Low-fat protein foods include:    Fish    Plant proteins, such as dry beans and peas, nuts, and soy products like tofu and soymilk    Lean meat with all visible fat removed    Poultry with the skin removed    Low-fat or nonfat milk, cheese, and yogurt  Limit unhealthy fats and sugar  Saturated and trans fats are unhealthy for your heart. They raise LDL (bad) cholesterol. Fat is also high in calories, so it can make you gain weight. To cut down on unhealthy fats and sugar, limit these foods:    Butter or margarine    Palm and palm kernel oils and coconut oil    Cream    Cheese    Yip    Lunch meats       Ice cream    Sweet bakery goods  such as pies, muffins, and donuts    Jams and jellies    Candy bars    Regular sodas   How much to eat  The amount of food you eat affects your blood sugar. It also affects your weight. Your healthcare team will tell you how much of each type of food you should eat.    Use measuring cups and spoons and a food scale to measure serving sizes.    Learn what a correct serving size looks like on your plate. This will help when you are away from home and can t measure your servings.    Eat only the number of servings given on your meal plan for each food. Don t take seconds.    Learn to read food labels. Be sure to look at serving size, total carbohydrates, fiber, calories, sugar, and saturated and trans fats. Look for healthier alternatives to foods that have added sugar.    Plan ahead for parties so you can still have a good time without going overboard with unhealthy food choices. Set a good example yourself by bringing a healthy dish to pot lucks.   Choose healthy snacks  When it comes to snacks, we usually think about foods with added sugar and fats. But there are many other options for healthier snack choices. Here are a few snack ideas to choose from:  Snacks with less than 5 grams of carbohydrates    1 piece of string cheese    3 celery sticks plus 1 tablespoon of peanut butter    5 cherry tomatoes plus 1 tablespoon of ranch dressing    1 hard-boiled egg    1/4 cup of fresh blueberries     5 baby carrots    1 cup of light popcorn    1/2 cup of sugar-free gelatin    15 almonds  Snacks with about 10 to 20 grams of carbohydrates    1/3 cup of hummus plus 1 cup of fresh cut nonstarchy vegetables (carrots, green peppers, broccoli, celery, or a combination)    1/2 cup of fresh or canned fruit plus 1/4 cup of cottage cheese    1/2 cup of tuna salad with 4 crackers    2 rice cakes and a tablespoon of peanut butter    1 small apple or orange    3 cups light popcorn    1/2 of a turkey sandwich (1 slice of whole-wheat bread,  2 ounces of turkey, and mustard)  Portion sizes are important to controlling your blood sugar and staying at a healthy weight. Stock up on healthy snack items so you always have them on hand.  When to eat  Your meal plan will likely include breakfast, lunch, dinner, and some snacks.    Try to eat your meals and snacks at about the same times each day.    Eat all your meals and snacks. Skipping a meal or snack can make your blood sugar drop too low. It can also cause you to eat too much at the next meal or snack. Then your blood sugar could get too high.  Date Last Reviewed: 7/1/2016 2000-2017 [employer]. [address] [place] [address], [place] [address]. All rights reserved. This information is not intended as a substitute for professional medical care. Always follow your healthcare professional's instructions.        Understanding Carbohydrates    A car needs the right type of fuel to run. And you need the right kind of food to function. To keep your energy level up, your body needs food that has carbohydrates. But carbohydrates raise blood sugar levels higher and faster than other kinds of food. Your dietitian will work with you to figure out the amount of carbohydrates you need.  Starches  Starches are found in grains, some vegetables, and beans. Grain products include bread, pasta, cereal, and tortillas. Starchy vegetables include potatoes, peas, corn, lima beans, yams, and squash. Kidney beans, handley beans, black beans, garbanzo beans, and lentils also contain starches.  Sugars  Sugars are found naturally in many foods. Or sugar can be added. Foods that contain natural sugar include fruits and fruit juices, dairy products, honey, and molasses. Added sugars are found in most desserts, processed foods, candy, regular soda, and fruit drinks. These are very helpful for treating low blood sugar, or hypoglycemia. They provide sugar quickly. Try to keep at least 15 to 20 grams of these simple sugars with you  at all times. Eat this if you begin to have low blood sugar symptoms.  Fiber  Fiber comes from plant foods. Most fiber isn t digested by the body. Instead of raising blood sugar levels like other carbohydrates, it actually stops blood sugar from rising too fast. Fiber is found in fruits, vegetables, whole grains, beans, peas, and many nuts.  Carb counting  Keep track of the amount of carbohydrates you eat. This can help you keep the right balance of physical activity and medicine. The amount of carbohydrates needed will vary for each person. It depends on many things such as your health, the medicines you take, and how active you are. Your healthcare team will help you figure out the right amount of carbohydrates for you. You may start with around 45 to 60 grams of carbohydrate per meal, depending on your situation. Carb counting is a system that helps you keep track of the carbohydrates you eat at each meal.  Carbohydrates come from a variety of foods. These include grains, starchy vegetables, fruit, milk, beans, and snack foods. You can either count carbohydrate grams or carbohydrate servings. When you count carbohydrate servings, 1 carbohydrate serving = 15 grams of carbohydrates.  Here are some examples of foods containing about 15 grams of carbohydrates (1 serving of carbohydrates):    1/2 cup of canned or frozen fruit    A small piece of fresh fruit (4 ounces)    1 slice of bread    1/2 cup of oatmeal    1/3 cup of rice    4 to 6 crackers    1/2 English muffin    1/2 cup of black beans    1/4 of a large baked potato (3 ounces)    2/3 cup of plain fat-free yogurt    1 cup of soup    1/2 cup of casserole    6 chicken nuggets    2-inch-square brownie or cake without frosting    2 small cookies    1/2 cup of ice cream or sherbet  Carb counting is easier when food labels are available. Look at the label to see how many grams of total carbohydrates the food contains. Then you can figure out how much you should eat.   Two very important lines to look at on the label are the serving size and the total carbohydrate amount. Here are some tips for using food labels to count your carbohydrate intake:    Check the serving size. The information on the label is based on that serving size. If you eat more than the listed serving size, you may have to double or triple the other information on the label.     Check the total grams of carbohydrates. Total carbohydrate from the label includes sugar, starch, and fiber. Be sure to use the total carbohydrate number and not sugar alone.    Know how many grams of carbohydrates you can have.  Be familiar with the matching portion sizes.    Compare labels. Compare the labels of different products, looking at serving sizes and total carbohydrates to find the products that work best for you.     Don't forget protein and fat. With all the focus on carb counting, it might be easy to forget protein and fat in your meals. Don't forget to include sources of protein and healthy fat to balance your meals.  It s also important to be consistent with the amount and time you eat when taking a fixed dose of diabetes medicine. Work with your healthcare provider or dietitian if you need additional help. He or she can help you keep track of your carbohydrate intake. He or she can also help you figure out how many grams of carbohydrates you should have.  Date Last Reviewed: 7/1/2016 2000-2017 [employer]. [address] [place] [address], [place] [address]. All rights reserved. This information is not intended as a substitute for professional medical care. Always follow your healthcare professional's instructions.

## 2018-10-21 PROBLEM — E11.9 TYPE 2 DIABETES MELLITUS WITHOUT COMPLICATION, WITHOUT LONG-TERM CURRENT USE OF INSULIN (H): Status: ACTIVE | Noted: 2018-10-21

## 2018-10-21 PROBLEM — E66.01 MORBID OBESITY (H): Status: ACTIVE | Noted: 2018-10-21

## 2018-10-21 RX ORDER — GLYBURIDE 5 MG/1
5 TABLET ORAL 2 TIMES DAILY WITH MEALS
Qty: 90 TABLET | Refills: 1 | Status: SHIPPED | OUTPATIENT
Start: 2018-10-21 | End: 2019-01-11

## 2018-10-21 RX ORDER — GLUCOSAMINE HCL/CHONDROITIN SU 500-400 MG
CAPSULE ORAL
Qty: 100 EACH | Refills: 3 | Status: SHIPPED | OUTPATIENT
Start: 2018-10-21 | End: 2022-01-06

## 2018-10-21 RX ORDER — LANCETS
EACH MISCELLANEOUS
Qty: 100 EACH | Refills: 1 | Status: SHIPPED | OUTPATIENT
Start: 2018-10-21 | End: 2022-01-06

## 2018-10-22 ENCOUNTER — TELEPHONE (OUTPATIENT)
Dept: FAMILY MEDICINE | Facility: OTHER | Age: 43
End: 2018-10-22

## 2018-10-22 DIAGNOSIS — E11.9 TYPE 2 DIABETES MELLITUS WITHOUT COMPLICATION, WITHOUT LONG-TERM CURRENT USE OF INSULIN (H): ICD-10-CM

## 2018-10-22 DIAGNOSIS — M62.830 SPASM OF BACK MUSCLES: Primary | ICD-10-CM

## 2018-10-22 RX ORDER — METFORMIN HCL 500 MG
1000 TABLET, EXTENDED RELEASE 24 HR ORAL 2 TIMES DAILY WITH MEALS
Qty: 120 TABLET | Refills: 2 | Status: SHIPPED | OUTPATIENT
Start: 2018-10-22 | End: 2019-01-11

## 2018-10-22 RX ORDER — METHOCARBAMOL 500 MG/1
500 TABLET, FILM COATED ORAL 4 TIMES DAILY PRN
Qty: 120 TABLET | Refills: 2 | Status: SHIPPED | OUTPATIENT
Start: 2018-10-22 | End: 2019-04-12

## 2018-10-22 NOTE — TELEPHONE ENCOUNTER
----- Message from Pelon Cotter PA-C sent at 10/21/2018 11:42 AM CDT -----  Please call with results. Please ensure that the B12 and folate levels were within normal range. Kidney function, thyroid and cell counts were normal as well. The blood glucose was elevated at 245 (normal <100) and the A1c which is representation of blood sugar levels over the past 3 months was elevated at 10.4 (preferred range is between 6 and 8). At this time a medication adjustment is recommended, you are already on the maximum dose of your metformin so we will not adjust that. The glyburide can be increased though, I recommend we change from taking 2.5mg twice daily to 5mg twice daily. Per our discussion at your appointment I will also set you up with a glucometer and testing strips so that you can start checking your sugar levels and recording them. I would recommend meeting with a diabetic educator to learn more about how to best manage this condition. I will place orders for all of these, you should expect a call from central scheduling within 1-2 days.     Pelon Cotter PA-C

## 2018-10-22 NOTE — TELEPHONE ENCOUNTER
I called this patient with the following per Pelon Cotter PA-C:  B12 and folate levels were within normal range. Kidney function, thyroid and cell counts were normal as well. The blood glucose was elevated at 245 (normal <100) and the A1c which is representation of blood sugar levels over the past 3 months was elevated at 10.4 (preferred range is between 6 and 8). At this time a medication adjustment is recommended, you are already on the maximum dose of your metformin so we will not adjust that. The glyburide can be increased though, I recommend we change from taking 2.5mg twice daily to 5mg twice daily. Per our discussion at your appointment I will also set you up with a glucometer and testing strips so that you can start checking your sugar levels and recording them. I would recommend meeting with a diabetic educator to learn more about how to best manage this condition. I will place orders for all of these, you should expect a call from central scheduling within 1-2 days.

## 2018-10-26 ENCOUNTER — HEALTH MAINTENANCE LETTER (OUTPATIENT)
Age: 43
End: 2018-10-26

## 2018-11-16 ENCOUNTER — OFFICE VISIT (OUTPATIENT)
Dept: FAMILY MEDICINE | Facility: OTHER | Age: 43
End: 2018-11-16
Payer: COMMERCIAL

## 2018-11-16 VITALS
WEIGHT: 293 LBS | SYSTOLIC BLOOD PRESSURE: 134 MMHG | DIASTOLIC BLOOD PRESSURE: 76 MMHG | TEMPERATURE: 96.8 F | HEART RATE: 76 BPM | RESPIRATION RATE: 16 BRPM

## 2018-11-16 DIAGNOSIS — E11.9 TYPE 2 DIABETES MELLITUS WITHOUT COMPLICATION, WITHOUT LONG-TERM CURRENT USE OF INSULIN (H): ICD-10-CM

## 2018-11-16 DIAGNOSIS — M67.90 TENDON NODULE: ICD-10-CM

## 2018-11-16 DIAGNOSIS — G62.9 NEUROPATHY: Primary | ICD-10-CM

## 2018-11-16 LAB — HBA1C MFR BLD: 9.2 % (ref 0–5.6)

## 2018-11-16 PROCEDURE — 83036 HEMOGLOBIN GLYCOSYLATED A1C: CPT | Performed by: PHYSICIAN ASSISTANT

## 2018-11-16 PROCEDURE — 99213 OFFICE O/P EST LOW 20 MIN: CPT | Performed by: PHYSICIAN ASSISTANT

## 2018-11-16 PROCEDURE — 36415 COLL VENOUS BLD VENIPUNCTURE: CPT | Performed by: PHYSICIAN ASSISTANT

## 2018-11-16 RX ORDER — GABAPENTIN 300 MG/1
CAPSULE ORAL
Qty: 90 CAPSULE | Refills: 0 | Status: SHIPPED | OUTPATIENT
Start: 2018-11-16 | End: 2019-01-04

## 2018-11-16 ASSESSMENT — PAIN SCALES - GENERAL: PAINLEVEL: MILD PAIN (2)

## 2018-11-16 NOTE — PROGRESS NOTES
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        She is being seen for   Chief Complaints and History of Present Illnesses   Patient presents with     Consult     left wrist nodule per Pelon Cotter PA-C         History of Present Illness:   Brittney Moon is a 42 year old female who is seen in consultation at the request of Pelon Cotter PA-C.  History of Present illness:  Brittney presents for evaluation of:  1.) left wrist nodule  Onset: 3-4 weeks  Symptoms brought on by: nothing.   Character:  sharp, dull ache and radiation of pain up arm.    Progression of symptoms:  bigger.    Previous similar pain: no .   Pain Level:  4/10.   Previous treatments:  ice, heat  Currently on Blood thinners? No  Diagnosis of Diabetes? Yes  Painful when she bumps it.      Patient's past medical, surgical, social and family histories reviewed.       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 To accompany: Blood Glucose Monitor Brands: per insurance.   blood glucose monitoring (NO BRAND SPECIFIED) meter device kit Use to test blood sugar 1-2 times daily or as directed.   blood glucose monitoring (NO BRAND SPECIFIED) test strip Use to test blood sugar 1-2 times daily or as directed. To accompany: Blood Glucose Monitor Brands: per insurance.   gabapentin (NEURONTIN) 300 MG capsule Take 1 tablet (300 mg) every night for 1-3 days, then 1 tablet  "twice daily for 1-3 days, then 1 tablet three times daily   glyBURIDE (DIABETA /MICRONASE) 5 MG tablet Take 1 tablet (5 mg) by mouth 2 times daily (with meals)   metFORMIN (GLUCOPHAGE-XR) 500 MG 24 hr tablet Take 2 tablets (1,000 mg) by mouth 2 times daily (with meals)   methocarbamol (ROBAXIN) 500 MG tablet Take 1 tablet (500 mg) by mouth 4 times daily as needed for muscle spasms 1-3 tablets daily   STATIN NOT PRESCRIBED, INTENTIONAL, 1 each daily Please choose reason not prescribed, below   thin (NO BRAND SPECIFIED) lancets Use to test blood sugar 1-2 times daily or as directed. To accompany: Blood Glucose Monitor Brands: per insurance.     No current facility-administered medications on file prior to visit.       Allergies   Allergen Reactions     Codeine      Food      cilantro         Social History     Occupational History     Not on file.     Social History Main Topics     Smoking status: Never Smoker     Smokeless tobacco: Never Used     Alcohol use Yes      Comment: rare     Drug use: No     Sexual activity: No       Patient does not use Tobacco products.      No pertinent family history     REVIEW OF SYSTEMS  10 point review systems performed otherwise negative as noted as per history of present illness.      Physical Exam:  Vitals: /80  Ht 1.499 m (4' 11\")  Wt 142.9 kg (315 lb)  BMI 63.62 kg/m2  BMI= Body mass index is 63.62 kg/(m^2).    Constitutional: healthy, alert and no acute distress   Psychiatric: mentation appears normal and affect normal/bright  NEURO: no focal deficits  RESP: Normal with easy respirations and no use of accessory muscles to breathe, no audible wheezing or retractions  CV: regular pulse  SKIN: No erythema, rashes, excoriation, or breakdown. No evidence of infection.   JOINT/EXTREMITIES:left Wrist Exam: WRIST:  Inspection: mass/prominence noted: location: volar radial wrist, mobile mass  Palpation: Tender: over mass  Range of Motion: normal  Strength: no deficits    GAIT: " WC bound due to leg amputation  Lymph: no palpable lymph nodes    Diagnostic Modalities:  left wrist X-ray: No fracture, dislocation and or lesions. Ulnar positive.  Independent visualization of the images was performed.      Impression: left Ganglion cyst of wrist    Plan:  All of the above pertinent physical exam and imaging modalities findings was reviewed with Brittney.                                          CONSERVATIVE CARE:  I recommend conservative care for the patient to include steroid injections, activity modifications. Today I provided or dispensed info.                                        Aspiration and INJECTION PROCEDURE:  The patient was counseled about an aspiration and injection, including discussion of risks (including infection), contents of the injection, rationale for performing the injection, and expected benefits of the injection. The skin was prepped with alcohol and betadine and then utilizing sterile technique an aspiration and injection of the left wrist cyst region was performed. The aspiration did not yield any fluid.  The injection consisted 1ml of Kenalog (40mg per 1ml) with 3ml 1% lidocaine plain. The patient tolerated the injection well, and there were no complications. The injection site was covered with a Band-Aid. The injection was performed by Yolanda Pérez M.D.                                                FUTURE PLAN:  On their return if they still have symptoms we will consider MRI, injection of steroids.    BP Readings from Last 1 Encounters:   11/19/18 132/80       BP noted to be well controlled today in office.     Return to clinic 6, week(s), PRN, or sooner as needed for changes.  Re-x-ray on return: No    Yolanda Pérez M.D.

## 2018-11-16 NOTE — NURSING NOTE
Health Maintenance Due   Topic Date Due     FOOT EXAM Q1 YEAR  12/29/1974     EYE EXAM Q1 YEAR  12/29/1974     MICROALBUMIN Q1 YEAR  12/29/1974     PNEUMOVAX 1X HI RISK PATIENT < 65 (NO IB MSG)  12/29/1975     TETANUS IMMUNIZATION (SYSTEM ASSIGNED)  12/29/1991     HIV SCREEN (SYSTEM ASSIGNED)  12/29/1991     PAP SCREENING Q3 YR (SYSTEM ASSIGNED)  08/24/2004     Domitila GEORGE LPN

## 2018-11-16 NOTE — PROGRESS NOTES
SUBJECTIVE:   Brittney Moon is a 42 year old female who presents to clinic today for the following health issues:      Diabetes Follow-up    Patient is checking blood sugars: twice daily.    Blood sugar testing frequency justification: Uncontrolled diabetes  Results are as follows:         am - 130-160         suppertime - 130-160    Diabetic concerns: None     Symptoms of hypoglycemia (low blood sugar): none     Paresthesias (numbness or burning in feet) or sores: Yes numbness and burning     Date of last diabetic eye exam: 11 months     BP Readings from Last 2 Encounters:   11/16/18 134/76   10/19/18 136/80     Hemoglobin A1C (%)   Date Value   10/19/2018 10.4 (H)     LDL Cholesterol Calculated (mg/dL)   Date Value   10/19/2018 73       Diabetes Management Resources    Amount of exercise or physical activity: 6-7 days/week for an average of greater than 60 minutes    Problems taking medications regularly: No    Medication side effects: none    Diet: low fat/cholesterol        Patient is a 42 year old female who is here for follow up of her diabetes. She was last seen on 10/19/2018 following returning to the area from [place]. Labs at that time had found an elevated A1c of 10.4 and patient was asked to increase glyburide. Today she says that she had not been taking her medication as directed as she was having trouble affording it due to being in between insurances. She has recently resumed taking the medications as directed. Patient reports increased joint pain lately and attributes this to the cold weather. She also continues to have tingling in her right foot. We had discussed gabapentin at the last appointment, but she declined stating she had been on it before and did not like it. I suggested this again, but to try to low dose with slow increase. Patient is hoping to utilize sister's home in [place] for months of Jan - March this winter. She has a small, tender, hard lump on the palmar side of her left wrist. Patient  said that the lump first appeared 2-3 weeks ago. It has not grown, changed or caused numbness/weakness. Suspect tendon sheath nodule. Recommend that she see orthopedist to have it injected or removed.     Problem list and histories reviewed & adjusted, as indicated.  Additional history: as documented    Recent Labs   Lab Test  11/16/18   0930  10/19/18   1622   A1C  9.2*  10.4*   LDL   --   73   HDL   --   42*   TRIG   --   168*   CR   --   0.71   GFRESTIMATED   --   89   GFRESTBLACK   --   >90   POTASSIUM   --   3.8   TSH   --   2.61      BP Readings from Last 3 Encounters:   11/16/18 134/76   10/19/18 136/80   08/08/01 116/78    Wt Readings from Last 3 Encounters:   11/16/18 315 lb (142.9 kg)   10/19/18 317 lb 3.2 oz (143.9 kg)   08/08/01 260 lb (117.9 kg)                    Reviewed and updated as needed this visit by clinical staff  Tobacco  Allergies  Meds  Med Hx  Surg Hx  Fam Hx  Soc Hx      Reviewed and updated as needed this visit by Provider         ROS:  Constitutional, HEENT, cardiovascular, pulmonary, gi and gu systems are negative, except as otherwise noted.    OBJECTIVE:     /76 (BP Location: Right arm, Patient Position: Chair, Cuff Size: Adult Large)  Pulse 76  Temp 96.8  F (36  C) (Oral)  Resp 16  Wt 315 lb (142.9 kg)  There is no height or weight on file to calculate BMI.  GENERAL: healthy, alert and no distress  RESP: lungs clear to auscultation - no rales, rhonchi or wheezes  CV: regular rate and rhythm, normal S1 S2, no S3 or S4, no murmur, click or rub, no peripheral edema and peripheral pulses strong  ABDOMEN: obese, soft, nontender, no hepatosplenomegaly, no masses and bowel sounds normal  MUSC: Small pea size nodule present on the flexor surface of the left wrist, near medial to radial styloid, tender to palpation, fixed, non-fluctuant  NEURO: Normal strength and tone, mentation intact and speech normal  PSYCH: mentation appears normal, affect normal/bright  Diabetic foot  exam: normal DP and PT pulses, no trophic changes or ulcerative lesions, normal sensory exam and left foot/leg absent due to amputation    Diagnostic Test Results:  Results for orders placed or performed in visit on 11/16/18 (from the past 24 hour(s))   Hemoglobin A1c   Result Value Ref Range    Hemoglobin A1C 9.2 (H) 0 - 5.6 %       ASSESSMENT/PLAN:   1. Neuropathy  Patient continues to have numbness/tingling in her right foot. We will try low dose gabapentin with the understanding that additional titration/adjustment may be required.   - gabapentin (NEURONTIN) 300 MG capsule; Take 1 tablet (300 mg) every night for 1-3 days, then 1 tablet twice daily for 1-3 days, then 1 tablet three times daily  Dispense: 90 capsule; Refill: 0    2. Type 2 diabetes mellitus without complication, without long-term current use of insulin (H)  Hemoglobin returned elevated, but improved. Informed patient and recommended follow up in 3 months. No changes will be made at this time. Patient decided she did not want to see the diabetic education specialists.   - STATIN NOT PRESCRIBED, INTENTIONAL,; 1 each daily Please choose reason not prescribed, below  - Hemoglobin A1c    3. Tendon nodule  Patient will schedule orthopedic consult to discuss treatment options for the growth on the left wrist.   - ORTHOPEDICS ADULT REFERRAL    Follow up with clinic 3 months or sooner if conditions change, worsen or fail to improve as expected.      Pelon Cotter PA-C  [facility]

## 2018-11-16 NOTE — PATIENT INSTRUCTIONS
In order to achieve the A1C goal, a fasting glucose of 80 to 130 mg/dL (4.4 to 7.2 mmol/L) and a postprandial glucose (90 to 120 minutes after a meal) less than 180 mg/dL (10 mmol/L) are usually necessary [39]. The A1C goal should be set somewhat higher (eg, <8 percent) for older patients and those with a limited life expectancy. The American Geriatrics Society suggests an A1C target of 8 percent for frail older adults and individuals with life expectancy of less than five years. These recommendations are supported by a decision analysis integrating multiple prediction models [40]. In this analysis, comorbid conditions and functional impairment were better predictors of both life expectancy and less benefit from intensive glucose control than age alone.

## 2018-11-16 NOTE — MR AVS SNAPSHOT
After Visit Summary   11/16/2018    Brittney Moon    MRN: [id number]           Patient Information     Date Of Birth          1975        Visit Information        Provider Department      11/16/2018 9:00 AM Pelon Cotter PA-C [facility]        Today's Diagnoses     Neuropathy    -  1    Type 2 diabetes mellitus without complication, without long-term current use of insulin (H)        Tendon nodule          Care Instructions    In order to achieve the A1C goal, a fasting glucose of 80 to 130 mg/dL (4.4 to 7.2 mmol/L) and a postprandial glucose (90 to 120 minutes after a meal) less than 180 mg/dL (10 mmol/L) are usually necessary [39]. The A1C goal should be set somewhat higher (eg, <8 percent) for older patients and those with a limited life expectancy. The American Geriatrics Society suggests an A1C target of 8 percent for frail older adults and individuals with life expectancy of less than five years. These recommendations are supported by a decision analysis integrating multiple prediction models [40]. In this analysis, comorbid conditions and functional impairment were better predictors of both life expectancy and less benefit from intensive glucose control than age alone.                 Follow-ups after your visit        Additional Services     ORTHOPEDICS ADULT REFERRAL       Your provider has referred you to: [facility]: [facility] [phone]   [email or web address]    Please be aware that coverage of these services is subject to the terms and limitations of your health insurance plan.  Call member services at your health plan with any benefit or coverage questions.      Please bring the following to your appointment:    >>   Any x-rays, CTs or MRIs which have been performed.  Contact the facility where they were done to arrange for  prior to your scheduled appointment.    >>   List of current medications   >>    This referral request   >>   Any documents/labs given to you for this referral                  Your next 10 appointments already scheduled     Nov 19, 2018  9:40 AM CST   [facility]rez [facility]   [facility]    [address] [place] [id number] [phone]              Who to contact     If you have questions or need follow up information about today's clinic visit or your schedule please contact [facility] directly at [phone].  Normal or non-critical lab and imaging results will be communicated to you by [email or web address], letter or phone within 4 business days after the clinic has received the results. If you do not hear from us within 7 days, please contact the clinic through Papertont or phone. If you have a critical or abnormal lab result, we will notify you by phone as soon as possible.  Submit refill requests through CloudApps or call your pharmacy and they will forward the refill request to us. Please allow 3 business days for your refill to be completed.          Additional Information About Your Visit        MyChart Information     CloudApps gives you secure access to your electronic health record. If you see a primary care provider, you can also send messages to your care team and make appointments. If you have questions, please call your primary care clinic.  If you do not have a primary care provider, please call [phone] and they will assist you.        Care EveryWhere ID     This is your Care EveryWhere ID. This could be used by other organizations to access your [facility] medical records  [id number]        Your Vitals Were     Pulse Temperature Respirations             76 96.8  F (36  C) (Oral) 16          Blood Pressure from Last 3 Encounters:   11/16/18 134/76   10/19/18 136/80   08/08/01 116/78    Weight from Last 3 Encounters:   11/16/18 315 lb (142.9 kg)   10/19/18 317 lb 3.2 oz (143.9 kg)   08/08/01 260 lb (117.9  kg)              We Performed the Following     Hemoglobin A1c     ORTHOPEDICS ADULT REFERRAL          Today's Medication Changes          These changes are accurate as of 11/16/18  9:38 AM.  If you have any questions, ask your nurse or doctor.               Start taking these medicines.        Dose/Directions    gabapentin 300 MG capsule   Commonly known as:  NEURONTIN   Used for:  Neuropathy   Started by:  Pelon Cotter PA-C        Take 1 tablet (300 mg) every night for 1-3 days, then 1 tablet twice daily for 1-3 days, then 1 tablet three times daily   Quantity:  90 capsule   Refills:  0       STATIN NOT PRESCRIBED (INTENTIONAL)   Used for:  Type 2 diabetes mellitus without complication, without long-term current use of insulin (H)   Started by:  Pelon Cotter PA-C        Dose:  1 each   1 each daily Please choose reason not prescribed, below   Refills:  0            Where to get your medicines      These medications were sent to [facility] [address]   [address] [facility] [address]     Phone:  [phone]     gabapentin 300 MG capsule         Some of these will need a paper prescription and others can be bought over the counter.  Ask your nurse if you have questions.     You don't need a prescription for these medications     STATIN NOT PRESCRIBED (INTENTIONAL)                Primary Care Provider Fax #    Physician No Ref-Primary [phone]       No address on file        Equal Access to Services     JER BYERS : Cheryl degroot Sorachel, waaxda luqadaha, qaybta kaalmada adeegyada, darshan edgar. So [facility] [phone].    ATENCIÓN: Si habla español, tiene a zacarias disposición servicios gratuitos de asistencia lingüística. Llame al [phone].    We comply with applicable federal civil rights laws and [place] laws. We do not discriminate on the basis of race, color, national origin, age, disability, sex, sexual orientation, or gender  identity.            Thank you!     Thank you for choosing [facility]  for your care. Our goal is always to provide you with excellent care. Hearing back from our patients is one way we can continue to improve our services. Please take a few minutes to complete the written survey that you may receive in the mail after your visit with us. Thank you!             Your Updated Medication List - Protect others around you: Learn how to safely use, store and throw away your medicines at www.disposemymeds.org.          This list is accurate as of 11/16/18  9:38 AM.  Always use your most recent med list.                   Brand Name Dispense Instructions for use Diagnosis    alcohol swab prep pads     100 each    Use to swab area of injection/tez as directed    Type 2 diabetes mellitus without complication, without long-term current use of insulin (H)       blood glucose calibration solution    NO BRAND SPECIFIED    1 Bottle    Use to calibrate blood glucose monitor as needed as directed. To accompany: Blood Glucose Monitor Brands: per insurance.    Type 2 diabetes mellitus without complication, without long-term current use of insulin (H)       blood glucose monitoring meter device kit    no brand specified    1 kit    Use to test blood sugar 1-2 times daily or as directed.    Type 2 diabetes mellitus without complication, without long-term current use of insulin (H)       blood glucose monitoring test strip    no brand specified    100 strip    Use to test blood sugar 1-2 times daily or as directed. To accompany: Blood Glucose Monitor Brands: per insurance.    Type 2 diabetes mellitus without complication, without long-term current use of insulin (H)       gabapentin 300 MG capsule    NEURONTIN    90 capsule    Take 1 tablet (300 mg) every night for 1-3 days, then 1 tablet twice daily for 1-3 days, then 1 tablet three times daily    Neuropathy       glyBURIDE 5 MG tablet    DIABETA /MICRONASE    90 tablet     Take 1 tablet (5 mg) by mouth 2 times daily (with meals)    Type 2 diabetes mellitus without complication, without long-term current use of insulin (H)       metFORMIN 500 MG 24 hr tablet    GLUCOPHAGE-XR    120 tablet    Take 2 tablets (1,000 mg) by mouth 2 times daily (with meals)    Type 2 diabetes mellitus without complication, without long-term current use of insulin (H)       methocarbamol 500 MG tablet    ROBAXIN    120 tablet    Take 1 tablet (500 mg) by mouth 4 times daily as needed for muscle spasms 1-3 tablets daily    Spasm of back muscles       STATIN NOT PRESCRIBED (INTENTIONAL)      1 each daily Please choose reason not prescribed, below    Type 2 diabetes mellitus without complication, without long-term current use of insulin (H)       thin lancets    NO BRAND SPECIFIED    100 each    Use to test blood sugar 1-2 times daily or as directed. To accompany: Blood Glucose Monitor Brands: per insurance.    Type 2 diabetes mellitus without complication, without long-term current use of insulin (H)

## 2018-11-19 ENCOUNTER — RADIANT APPOINTMENT (OUTPATIENT)
Dept: GENERAL RADIOLOGY | Facility: CLINIC | Age: 43
End: 2018-11-19
Attending: ORTHOPAEDIC SURGERY
Payer: COMMERCIAL

## 2018-11-19 ENCOUNTER — OFFICE VISIT (OUTPATIENT)
Dept: ORTHOPEDICS | Facility: CLINIC | Age: 43
End: 2018-11-19
Payer: COMMERCIAL

## 2018-11-19 VITALS
BODY MASS INDEX: 59.07 KG/M2 | SYSTOLIC BLOOD PRESSURE: 132 MMHG | DIASTOLIC BLOOD PRESSURE: 80 MMHG | HEIGHT: 59 IN | WEIGHT: 293 LBS

## 2018-11-19 DIAGNOSIS — M67.40 GANGLION CYST: Primary | ICD-10-CM

## 2018-11-19 DIAGNOSIS — M25.532 LEFT WRIST PAIN: ICD-10-CM

## 2018-11-19 PROCEDURE — 20612 ASPIRATE/INJ GANGLION CYST: CPT | Mod: LT | Performed by: ORTHOPAEDIC SURGERY

## 2018-11-19 PROCEDURE — 99243 OFF/OP CNSLTJ NEW/EST LOW 30: CPT | Mod: 25 | Performed by: ORTHOPAEDIC SURGERY

## 2018-11-19 PROCEDURE — 73110 X-RAY EXAM OF WRIST: CPT | Mod: TC

## 2018-11-19 ASSESSMENT — PAIN SCALES - GENERAL: PAINLEVEL: MODERATE PAIN (4)

## 2018-11-19 NOTE — PATIENT INSTRUCTIONS
Ganglion Cyst: Hand    A ganglion cyst is a firm, fluid-filled lump that can suddenly appear on the front or back of the wrist or at the base of a finger. These cysts grow from normal tissue in the wrist and fingers, and range in size from a pea to a peach pit. Although ganglion cysts are common, they don t spread, and they don t become cancerous. They can occur after an injury, but many times it isn t known why they grow. Ganglion cysts can change in size, and may go away on their own.  What are the symptoms of a ganglion cyst?  A ganglion cyst is sometimes painful, especially when it first occurs. Constantly using your hand or wrist can make the cyst enlarge and hurt more. Some hand and wrist movements, such as grasping things, may also be difficult.  How does a ganglion cyst develop?  Your wrist and hand are made up of many small bones that meet at joints. Tendons attach muscles to the bones at the joints. The tendons allow the joints to bend and straighten. Both tendons and joints are lined with tissue called synovium. This tissue makes a thick fluid that keeps the joints and tendons moving easily. Sometimes the tissue balloons out from the joint or tendons and forms a cyst. As the cyst fills with fluid and grows, it appears as a lump you can feel.  Where do ganglion cysts occur?  A ganglion cyst can occur anywhere on the hand near a joint. Cysts most commonly appear on the back or palm side of the wrist, or on the palm at the base of a finger. Your doctor can usually diagnose a cyst by examining the lump. He or she may draw off a little fluid or order an X-ray to rule out other problems.  How is a ganglion cyst treated?  Your healthcare provider may just watch your ganglion cyst. Many shrink and become painless without treatment. Some disappear altogether. If the cyst is unsightly or painful, or makes it hard for you to use your hand, your healthcare provider can treat it or, if needed, remove it  surgically.  Nonsurgical treatment  To shrink the cyst, your provider may remove (aspirate) the fluid with a needle. If the cyst hurts, your provider may also give you an injection of an anti-inflammatory, such as cortisone, to relieve the irritation. Your hand may then be wrapped to help keep the cyst from recurring.  Surgery  If the cyst reappears after treatment, your healthcare provider may remove it surgically. A section of the tissue that lines the joint or tendon is removed along with the cyst. This helps prevent another cyst from forming, although recurrence of the cyst is still possible after surgery. Usually, only your hand or arm is numbed, and you can go home a few hours after surgery. Your hand may be in a splint for several days.  Date Last Reviewed: 9/1/2017 2000-2018 [employer]. [address] [place] [address], [place] [address]. All rights reserved. This information is not intended as a substitute for professional medical care. Always follow your healthcare professional's instructions.

## 2018-11-19 NOTE — LETTER
11/19/2018         RE: Brittney Moon  [address]  [facility] [address]        Dear Colleague,    Thank you for referring your patient, Brittney Moon, to the [facility]. Please see a copy of my visit note below.    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        She is being seen for   Chief Complaints and History of Present Illnesses   Patient presents with     Consult     left wrist nodule per Pelon Cotter PA-C         History of Present Illness:   Brittney Moon is a 42 year old female who is seen in consultation at the request of Pelon Cotter PA-C.  History of Present illness:  Brittney presents for evaluation of:  1.) left wrist nodule  Onset: 3-4 weeks  Symptoms brought on by: nothing.   Character:  sharp, dull ache and radiation of pain up arm.    Progression of symptoms:  bigger.    Previous similar pain: no .   Pain Level:  4/10.   Previous treatments:  ice, heat  Currently on Blood thinners? No  Diagnosis of Diabetes? Yes  Painful when she bumps it.      Patient's past medical, surgical, social and family histories reviewed.       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 To accompany: Blood Glucose Monitor Brands: per insurance.   blood glucose monitoring (NO BRAND SPECIFIED) meter device kit Use to test blood sugar 1-2 times daily or as directed.   blood glucose monitoring (NO BRAND  "SPECIFIED) test strip Use to test blood sugar 1-2 times daily or as directed. To accompany: Blood Glucose Monitor Brands: per insurance.   gabapentin (NEURONTIN) 300 MG capsule Take 1 tablet (300 mg) every night for 1-3 days, then 1 tablet twice daily for 1-3 days, then 1 tablet three times daily   glyBURIDE (DIABETA /MICRONASE) 5 MG tablet Take 1 tablet (5 mg) by mouth 2 times daily (with meals)   metFORMIN (GLUCOPHAGE-XR) 500 MG 24 hr tablet Take 2 tablets (1,000 mg) by mouth 2 times daily (with meals)   methocarbamol (ROBAXIN) 500 MG tablet Take 1 tablet (500 mg) by mouth 4 times daily as needed for muscle spasms 1-3 tablets daily   STATIN NOT PRESCRIBED, INTENTIONAL, 1 each daily Please choose reason not prescribed, below   thin (NO BRAND SPECIFIED) lancets Use to test blood sugar 1-2 times daily or as directed. To accompany: Blood Glucose Monitor Brands: per insurance.     No current facility-administered medications on file prior to visit.       Allergies   Allergen Reactions     Codeine      Food      cilantro         Social History     Occupational History     Not on file.     Social History Main Topics     Smoking status: Never Smoker     Smokeless tobacco: Never Used     Alcohol use Yes      Comment: rare     Drug use: No     Sexual activity: No       Patient does not use Tobacco products.      No pertinent family history     REVIEW OF SYSTEMS  10 point review systems performed otherwise negative as noted as per history of present illness.      Physical Exam:  Vitals: /80  Ht 1.499 m (4' 11\")  Wt 142.9 kg (315 lb)  BMI 63.62 kg/m2  BMI= Body mass index is 63.62 kg/(m^2).    Constitutional: healthy, alert and no acute distress   Psychiatric: mentation appears normal and affect normal/bright  NEURO: no focal deficits  RESP: Normal with easy respirations and no use of accessory muscles to breathe, no audible wheezing or retractions  CV: regular pulse  SKIN: No erythema, rashes, excoriation, or " breakdown. No evidence of infection.   JOINT/EXTREMITIES:left Wrist Exam: WRIST:  Inspection: mass/prominence noted: location: volar radial wrist, mobile mass  Palpation: Tender: over mass  Range of Motion: normal  Strength: no deficits    GAIT: WC bound due to leg amputation  Lymph: no palpable lymph nodes    Diagnostic Modalities:  left wrist X-ray: No fracture, dislocation and or lesions. Ulnar positive.  Independent visualization of the images was performed.      Impression: left Ganglion cyst of wrist    Plan:  All of the above pertinent physical exam and imaging modalities findings was reviewed with Brittney.                                          CONSERVATIVE CARE:  I recommend conservative care for the patient to include steroid injections, activity modifications. Today I provided or dispensed info.                                        Aspiration and INJECTION PROCEDURE:  The patient was counseled about an aspiration and injection, including discussion of risks (including infection), contents of the injection, rationale for performing the injection, and expected benefits of the injection. The skin was prepped with alcohol and betadine and then utilizing sterile technique an aspiration and injection of the left wrist cyst region was performed. The aspiration did not yield any fluid.  The injection consisted 1ml of Kenalog (40mg per 1ml) with 3ml 1% lidocaine plain. The patient tolerated the injection well, and there were no complications. The injection site was covered with a Band-Aid. The injection was performed by Yolanda Pérez M.D.                                                FUTURE PLAN:  On their return if they still have symptoms we will consider MRI, injection of steroids.    BP Readings from Last 1 Encounters:   11/19/18 132/80       BP noted to be well controlled today in office.     Return to clinic 6, week(s), PRN, or sooner as needed for changes.  Re-x-ray on return: Paula Guerrero  ETIENNE Pérez.    Again, thank you for allowing me to participate in the care of your patient.        Sincerely,        Yolanda Pérez MD

## 2018-12-07 ENCOUNTER — OFFICE VISIT (OUTPATIENT)
Dept: ORTHOPEDICS | Facility: CLINIC | Age: 43
End: 2018-12-07
Payer: COMMERCIAL

## 2018-12-07 VITALS
WEIGHT: 293 LBS | SYSTOLIC BLOOD PRESSURE: 126 MMHG | BODY MASS INDEX: 59.07 KG/M2 | HEIGHT: 59 IN | DIASTOLIC BLOOD PRESSURE: 89 MMHG

## 2018-12-07 DIAGNOSIS — M67.40 GANGLION CYST: Primary | ICD-10-CM

## 2018-12-07 PROCEDURE — 99213 OFFICE O/P EST LOW 20 MIN: CPT | Performed by: ORTHOPAEDIC SURGERY

## 2018-12-07 ASSESSMENT — PAIN SCALES - GENERAL: PAINLEVEL: MODERATE PAIN (5)

## 2018-12-07 NOTE — PROGRESS NOTES
"Office Visit-Follow up      Chief Complaint: Brittney Moon is a 42 year old female who is being seen for   Chief Complaint   Patient presents with     RECHECK     left Ganglion cyst of wrist         History of Present Illness:   Injection didn't seem to help  Still has cyst and it is painful when she bumps it, which is a lot      Past medical history reviewed and there is no significant change    Patient does not use Tobacco products.    REVIEW OF SYSTEMS  General: negative for, night sweats, dizziness, fatigue  Resp: No shortness of breath and no cough  CV: negative for chest pain, syncope or near-syncope  GI: negative for nausea, vomiting and diarrhea  : negative for dysuria and hematuria  Musculoskeletal: as above  Neurologic: negative for syncope   Hematologic: negative for bleeding disorder      Physical Exam:  Vitals: /89  Ht 1.499 m (4' 11\")  Wt 140.6 kg (310 lb)  BMI 62.61 kg/m2  BMI= Body mass index is 62.61 kg/(m^2).  Constitutional: healthy, alert and no acute distress   Psychiatric: mentation appears normal and affect normal/bright  NEURO: no focal deficits  RESP: Normal with easy respirations and no use of accessory muscles to breathe, no audible wheezing or retractions  CV: No peripheral edema  SKIN: No erythema, rashes, excoriation, or breakdown. No evidence of infection.   JOINT/EXTREMITIES:left Wrist Exam: WRIST:  Inspection: mass/prominence noted: location: volar radial small, no visible but palpable  Palpation: Tender: over mass  Range of Motion: normal  Strength: no deficits    GAIT: non-antalgic      Diagnostic Modalities:  None today.        Impression: left Ganglion cyst of wrist    Plan:  All of the above pertinent physical exam and imaging modalities findings was reviewed with Brittney.                                          CONSERVATIVE CARE:  I recommend conservative care for the patient to include activity modifications, mri. Today I provided or dispensed mri referral.    BP " Readings from Last 1 Encounters:   12/07/18 126/89       BP noted to be well controlled today in office.     Return to clinic to discuss test results, or sooner as needed for changes. Will consider surgery.  Re-x-ray on return: No    Yolanda Pérez M.D.

## 2018-12-07 NOTE — LETTER
"    12/7/2018         RE: Brittney Moon  [address]  [facility] [address]        Dear Colleague,    Thank you for referring your patient, Brittney Moon, to the [facility]. Please see a copy of my visit note below.    Office Visit-Follow up      Chief Complaint: Brittney Moon is a 42 year old female who is being seen for   Chief Complaint   Patient presents with     RECHECK     left Ganglion cyst of wrist         History of Present Illness:   Injection didn't seem to help  Still has cyst and it is painful when she bumps it, which is a lot      Past medical history reviewed and there is no significant change    Patient does not use Tobacco products.    REVIEW OF SYSTEMS  General: negative for, night sweats, dizziness, fatigue  Resp: No shortness of breath and no cough  CV: negative for chest pain, syncope or near-syncope  GI: negative for nausea, vomiting and diarrhea  : negative for dysuria and hematuria  Musculoskeletal: as above  Neurologic: negative for syncope   Hematologic: negative for bleeding disorder      Physical Exam:  Vitals: /89  Ht 1.499 m (4' 11\")  Wt 140.6 kg (310 lb)  BMI 62.61 kg/m2  BMI= Body mass index is 62.61 kg/(m^2).  Constitutional: healthy, alert and no acute distress   Psychiatric: mentation appears normal and affect normal/bright  NEURO: no focal deficits  RESP: Normal with easy respirations and no use of accessory muscles to breathe, no audible wheezing or retractions  CV: No peripheral edema  SKIN: No erythema, rashes, excoriation, or breakdown. No evidence of infection.   JOINT/EXTREMITIES:left Wrist Exam: WRIST:  Inspection: mass/prominence noted: location: volar radial small, no visible but palpable  Palpation: Tender: over mass  Range of Motion: normal  Strength: no deficits    GAIT: non-antalgic      Diagnostic Modalities:  None today.        Impression: left Ganglion cyst of wrist    Plan:  All of the above pertinent physical exam and imaging " modalities findings was reviewed with Brittney.                                          CONSERVATIVE CARE:  I recommend conservative care for the patient to include activity modifications, mri. Today I provided or dispensed mri referral.    BP Readings from Last 1 Encounters:   12/07/18 126/89       BP noted to be well controlled today in office.     Return to clinic to discuss test results, or sooner as needed for changes. Will consider surgery.  Re-x-ray on return: No    Yolanda Pérez M.D.          Again, thank you for allowing me to participate in the care of your patient.        Sincerely,        Yolanda Pérez MD

## 2018-12-07 NOTE — MR AVS SNAPSHOT
"              After Visit Summary   12/7/2018    Brittney Moon    MRN: [id number]           Patient Information     Date Of Birth          1975        Visit Information        Provider Department      12/7/2018 8:50 AM Yolanda Pérez MD [facility]        Today's Diagnoses     Ganglion cyst    -  1       Follow-ups after your visit        Future tests that were ordered for you today     Open Future Orders        Priority Expected Expires Ordered    MR Wrist Left w/o & w Contrast Routine  12/7/2019 12/7/2018            Who to contact     If you have questions or need follow up information about today's clinic visit or your schedule please contact [facility] directly at [phone].  Normal or non-critical lab and imaging results will be communicated to you by yoonewhart, letter or phone within 4 business days after the clinic has received the results. If you do not hear from us within 7 days, please contact the clinic through yoonewhart or phone. If you have a critical or abnormal lab result, we will notify you by phone as soon as possible.  Submit refill requests through [facility] or call your pharmacy and they will forward the refill request to us. Please allow 3 business days for your refill to be completed.          Additional Information About Your Visit        MyChart Information     [facility] gives you secure access to your electronic health record. If you see a primary care provider, you can also send messages to your care team and make appointments. If you have questions, please call your primary care clinic.  If you do not have a primary care provider, please call [phone] and they will assist you.        Care EveryWhere ID     This is your Care EveryWhere ID. This could be used by other organizations to access your [facility] records  [id number]        Your Vitals Were     Height BMI (Body Mass Index)                1.499 m (4' 11\") 62.61 kg/m2           Blood " Pressure from Last 3 Encounters:   12/07/18 126/89   11/19/18 132/80   11/16/18 134/76    Weight from Last 3 Encounters:   12/07/18 140.6 kg (310 lb)   11/19/18 142.9 kg (315 lb)   11/16/18 142.9 kg (315 lb)               Primary Care Provider Fax #    Physician No Ref-Primary [phone]       No address on file        Equal Access to Services     JER BYERS : Hadii aad ku hadasho Soomaali, waaxda luqadaha, qaybta kaalmada adeegyada, waxay lisain hayxiomaran damarijustino melton radha . So [facility] [phone].    ATENCIÓN: Si habla español, tiene a zacarias disposición servicios gratuitos de asistencia lingüística. Llame al [phone].    We comply with applicable federal civil rights laws and [place] laws. We do not discriminate on the basis of race, color, national origin, age, disability, sex, sexual orientation, or gender identity.            Thank you!     Thank you for choosing [facility]  for your care. Our goal is always to provide you with excellent care. Hearing back from our patients is one way we can continue to improve our services. Please take a few minutes to complete the written survey that you may receive in the mail after your visit with us. Thank you!             Your Updated Medication List - Protect others around you: Learn how to safely use, store and throw away your medicines at www.disposemymeds.org.          This list is accurate as of 12/7/18 10:58 AM.  Always use your most recent med list.                   Brand Name Dispense Instructions for use Diagnosis    alcohol swab prep pads     100 each    Use to swab area of injection/tez as directed    Type 2 diabetes mellitus without complication, without long-term current use of insulin (H)       blood glucose calibration solution    NO BRAND SPECIFIED    1 Bottle    Use to calibrate blood glucose monitor as needed as directed. To accompany: Blood Glucose Monitor Brands: per insurance.    Type 2 diabetes mellitus without complication,  without long-term current use of insulin (H)       blood glucose monitoring meter device kit    NO BRAND SPECIFIED    1 kit    Use to test blood sugar 1-2 times daily or as directed.    Type 2 diabetes mellitus without complication, without long-term current use of insulin (H)       blood glucose monitoring test strip    NO BRAND SPECIFIED    100 strip    Use to test blood sugar 1-2 times daily or as directed. To accompany: Blood Glucose Monitor Brands: per insurance.    Type 2 diabetes mellitus without complication, without long-term current use of insulin (H)       gabapentin 300 MG capsule    NEURONTIN    90 capsule    Take 1 tablet (300 mg) every night for 1-3 days, then 1 tablet twice daily for 1-3 days, then 1 tablet three times daily    Neuropathy       glyBURIDE 5 MG tablet    DIABETA /MICRONASE    90 tablet    Take 1 tablet (5 mg) by mouth 2 times daily (with meals)    Type 2 diabetes mellitus without complication, without long-term current use of insulin (H)       metFORMIN 500 MG 24 hr tablet    GLUCOPHAGE-XR    120 tablet    Take 2 tablets (1,000 mg) by mouth 2 times daily (with meals)    Type 2 diabetes mellitus without complication, without long-term current use of insulin (H)       methocarbamol 500 MG tablet    ROBAXIN    120 tablet    Take 1 tablet (500 mg) by mouth 4 times daily as needed for muscle spasms 1-3 tablets daily    Spasm of back muscles       STATIN NOT PRESCRIBED    INTENTIONAL     1 each daily Please choose reason not prescribed, below    Type 2 diabetes mellitus without complication, without long-term current use of insulin (H)       thin lancets    NO BRAND SPECIFIED    100 each    Use to test blood sugar 1-2 times daily or as directed. To accompany: Blood Glucose Monitor Brands: per insurance.    Type 2 diabetes mellitus without complication, without long-term current use of insulin (H)

## 2018-12-10 ENCOUNTER — HOSPITAL ENCOUNTER (OUTPATIENT)
Dept: MRI IMAGING | Facility: CLINIC | Age: 43
Discharge: HOME | End: 2018-12-10
Attending: ORTHOPAEDIC SURGERY | Admitting: ORTHOPAEDIC SURGERY
Payer: COMMERCIAL

## 2018-12-10 DIAGNOSIS — M67.40 GANGLION CYST: ICD-10-CM

## 2018-12-10 LAB
CREAT BLD-MCNC: 0.8 MG/DL (ref 0.52–1.04)
GFR SERPL CREATININE-BSD FRML MDRD: 78 ML/MIN/1.7M2

## 2018-12-10 PROCEDURE — 25500064 ZZH RX 255 OP 636: Performed by: RADIOLOGY

## 2018-12-10 PROCEDURE — 73223 MRI JOINT UPR EXTR W/O&W/DYE: CPT | Mod: LT

## 2018-12-10 PROCEDURE — 82565 ASSAY OF CREATININE: CPT

## 2018-12-10 PROCEDURE — A9585 GADOBUTROL INJECTION: HCPCS | Performed by: RADIOLOGY

## 2018-12-10 RX ORDER — GADOBUTROL 604.72 MG/ML
10 INJECTION INTRAVENOUS ONCE
Status: COMPLETED | OUTPATIENT
Start: 2018-12-10 | End: 2018-12-10

## 2018-12-10 RX ADMIN — GADOBUTROL 10 ML: 604.72 INJECTION INTRAVENOUS at 16:55

## 2018-12-14 ENCOUNTER — OFFICE VISIT (OUTPATIENT)
Dept: ORTHOPEDICS | Facility: CLINIC | Age: 43
End: 2018-12-14
Payer: COMMERCIAL

## 2018-12-14 ENCOUNTER — TELEPHONE (OUTPATIENT)
Dept: ORTHOPEDICS | Facility: CLINIC | Age: 43
End: 2018-12-14

## 2018-12-14 VITALS
BODY MASS INDEX: 59.07 KG/M2 | SYSTOLIC BLOOD PRESSURE: 130 MMHG | DIASTOLIC BLOOD PRESSURE: 80 MMHG | WEIGHT: 293 LBS | HEIGHT: 59 IN

## 2018-12-14 DIAGNOSIS — M67.40 GANGLION CYST: Primary | ICD-10-CM

## 2018-12-14 PROCEDURE — 99213 OFFICE O/P EST LOW 20 MIN: CPT | Performed by: ORTHOPAEDIC SURGERY

## 2018-12-14 ASSESSMENT — MIFFLIN-ST. JEOR: SCORE: 1961.78

## 2018-12-14 ASSESSMENT — PAIN SCALES - GENERAL: PAINLEVEL: NO PAIN (0)

## 2018-12-14 NOTE — TELEPHONE ENCOUNTER
Type of surgery: excision left wrist ganglion cyst  Location of surgery: [facility]  Date and time of surgery: 12/24  Surgeon: Beto  Pre-Op Appt Date: 12/18  Post-Op Appt Date: 1/2   Packet sent out: Yes  Pre-cert/Authorization completed:  Not Applicable  Date: na

## 2018-12-14 NOTE — LETTER
"    12/14/2018         RE: Brittney Moon  [address]  [facility] [address]        Dear Colleague,    Thank you for referring your patient, Brittney Moon, to the [facility]. Please see a copy of my visit note below.    Office Visit-Follow up      Chief Complaint: Brittney Moon is a 42 year old female who is being seen for   Chief Complaint   Patient presents with     RECHECK     MRI results of left           History of Present Illness:   Cyst annoying and hurts when she bumps it  She is interested in having surgery before leaving for [place] 1/4 for a couple mos      Past medical history reviewed and there is no significant change    Patient does not use Tobacco products.    REVIEW OF SYSTEMS  General: negative for, night sweats, dizziness, fatigue  Resp: No shortness of breath and no cough  CV: negative for chest pain, syncope or near-syncope  GI: negative for nausea, vomiting and diarrhea  : negative for dysuria and hematuria  Musculoskeletal: as above  Neurologic: negative for syncope   Hematologic: negative for bleeding disorder      Physical Exam:  Vitals: /80   Ht 1.499 m (4' 11\")   Wt 140.6 kg (310 lb)   BMI 62.61 kg/m     BMI= Body mass index is 62.61 kg/m .  Constitutional: healthy, alert and no acute distress   Psychiatric: mentation appears normal and affect normal/bright  NEURO: no focal deficits  RESP: Normal with easy respirations and no use of accessory muscles to breathe, no audible wheezing or retractions  CV: No peripheral edema  SKIN: No erythema, rashes, excoriation, or breakdown. No evidence of infection.   JOINT/EXTREMITIES:left Wrist Exam: WRIST:  Inspection: no swelling  Palpation: Tender: over small benign mass volar radial wrist  Range of Motion: normal  Strength: no deficits    GAIT: not tested       Diagnostic Modalities:  left wrist MRI:  ganglion cyst adjacent to radial artery  Independent visualization of the images was performed.      Impression: " left Ganglion cyst of wrist    Plan:  All of the above pertinent physical exam and imaging modalities findings was reviewed with Brittney.                                           ELECTIVE SURGERY:  The patient has attempted conservative treatments that include NSAIDs, Tylenol, steroid injections, activity modifications yet still continues to have significant issues. These issues include increased pain with activity. Secondary to these reasons I have offered surgery in the form of excision ganglion cyst.    BP Readings from Last 1 Encounters:   12/14/18 130/80       BP noted to be well controlled today in office.     Return to clinic 10 days post-operatively. , 14 days post-operatively. , or sooner as needed for changes.  Re-x-ray on return: No    Yolanda Pérez M.D.          Again, thank you for allowing me to participate in the care of your patient.        Sincerely,        Yolanda Pérez MD

## 2018-12-14 NOTE — PROGRESS NOTES
"Office Visit-Follow up      Chief Complaint: Brittney Moon is a 42 year old female who is being seen for   Chief Complaint   Patient presents with     RECHECK     MRI results of left           History of Present Illness:   Cyst annoying and hurts when she bumps it  She is interested in having surgery before leaving for [place] 1/4 for a couple mos      Past medical history reviewed and there is no significant change    Patient does not use Tobacco products.    REVIEW OF SYSTEMS  General: negative for, night sweats, dizziness, fatigue  Resp: No shortness of breath and no cough  CV: negative for chest pain, syncope or near-syncope  GI: negative for nausea, vomiting and diarrhea  : negative for dysuria and hematuria  Musculoskeletal: as above  Neurologic: negative for syncope   Hematologic: negative for bleeding disorder      Physical Exam:  Vitals: /80   Ht 1.499 m (4' 11\")   Wt 140.6 kg (310 lb)   BMI 62.61 kg/m    BMI= Body mass index is 62.61 kg/m .  Constitutional: healthy, alert and no acute distress   Psychiatric: mentation appears normal and affect normal/bright  NEURO: no focal deficits  RESP: Normal with easy respirations and no use of accessory muscles to breathe, no audible wheezing or retractions  CV: No peripheral edema  SKIN: No erythema, rashes, excoriation, or breakdown. No evidence of infection.   JOINT/EXTREMITIES:left Wrist Exam: WRIST:  Inspection: no swelling  Palpation: Tender: over small benign mass volar radial wrist  Range of Motion: normal  Strength: no deficits    GAIT: not tested       Diagnostic Modalities:  left wrist MRI:  ganglion cyst adjacent to radial artery  Independent visualization of the images was performed.      Impression: left Ganglion cyst of wrist    Plan:  All of the above pertinent physical exam and imaging modalities findings was reviewed with Brittney.                                           ELECTIVE SURGERY:  The patient has attempted conservative " treatments that include NSAIDs, Tylenol, steroid injections, activity modifications yet still continues to have significant issues. These issues include increased pain with activity. Secondary to these reasons I have offered surgery in the form of excision ganglion cyst.    BP Readings from Last 1 Encounters:   12/14/18 130/80       BP noted to be well controlled today in office.     Return to clinic 10 days post-operatively. , 14 days post-operatively. , or sooner as needed for changes.  Re-x-ray on return: No    Yolanda Pérez M.D.

## 2018-12-18 ENCOUNTER — OFFICE VISIT (OUTPATIENT)
Dept: FAMILY MEDICINE | Facility: OTHER | Age: 43
End: 2018-12-18
Payer: COMMERCIAL

## 2018-12-18 VITALS
RESPIRATION RATE: 20 BRPM | SYSTOLIC BLOOD PRESSURE: 132 MMHG | TEMPERATURE: 96.1 F | HEART RATE: 84 BPM | HEIGHT: 59 IN | DIASTOLIC BLOOD PRESSURE: 78 MMHG | BODY MASS INDEX: 62.61 KG/M2

## 2018-12-18 DIAGNOSIS — E11.9 TYPE 2 DIABETES MELLITUS WITHOUT COMPLICATION, WITHOUT LONG-TERM CURRENT USE OF INSULIN (H): ICD-10-CM

## 2018-12-18 DIAGNOSIS — M67.40 GANGLION CYST: ICD-10-CM

## 2018-12-18 DIAGNOSIS — Z01.818 PREOP GENERAL PHYSICAL EXAM: Primary | ICD-10-CM

## 2018-12-18 LAB
ANION GAP SERPL CALCULATED.3IONS-SCNC: 8 MMOL/L (ref 3–14)
BASOPHILS # BLD AUTO: 0 10E9/L (ref 0–0.2)
BASOPHILS NFR BLD AUTO: 0.4 %
BUN SERPL-MCNC: 18 MG/DL (ref 7–30)
CALCIUM SERPL-MCNC: 8.9 MG/DL (ref 8.5–10.1)
CHLORIDE SERPL-SCNC: 104 MMOL/L (ref 94–109)
CO2 SERPL-SCNC: 27 MMOL/L (ref 20–32)
CREAT SERPL-MCNC: 0.85 MG/DL (ref 0.52–1.04)
DIFFERENTIAL METHOD BLD: NORMAL
EOSINOPHIL # BLD AUTO: 0.4 10E9/L (ref 0–0.7)
EOSINOPHIL NFR BLD AUTO: 3.3 %
ERYTHROCYTE [DISTWIDTH] IN BLOOD BY AUTOMATED COUNT: 13.9 % (ref 10–15)
GFR SERPL CREATININE-BSD FRML MDRD: 73 ML/MIN/1.7M2
GLUCOSE SERPL-MCNC: 190 MG/DL (ref 70–99)
HBA1C MFR BLD: 8.6 % (ref 0–5.6)
HCT VFR BLD AUTO: 42.8 % (ref 35–47)
HGB BLD-MCNC: 13.8 G/DL (ref 11.7–15.7)
LYMPHOCYTES # BLD AUTO: 2.2 10E9/L (ref 0.8–5.3)
LYMPHOCYTES NFR BLD AUTO: 20.5 %
MCH RBC QN AUTO: 28.1 PG (ref 26.5–33)
MCHC RBC AUTO-ENTMCNC: 32.2 G/DL (ref 31.5–36.5)
MCV RBC AUTO: 87 FL (ref 78–100)
MONOCYTES # BLD AUTO: 0.6 10E9/L (ref 0–1.3)
MONOCYTES NFR BLD AUTO: 5.3 %
NEUTROPHILS # BLD AUTO: 7.6 10E9/L (ref 1.6–8.3)
NEUTROPHILS NFR BLD AUTO: 70.5 %
PLATELET # BLD AUTO: 380 10E9/L (ref 150–450)
POTASSIUM SERPL-SCNC: 4 MMOL/L (ref 3.4–5.3)
RBC # BLD AUTO: 4.91 10E12/L (ref 3.8–5.2)
SODIUM SERPL-SCNC: 139 MMOL/L (ref 133–144)
WBC # BLD AUTO: 10.8 10E9/L (ref 4–11)

## 2018-12-18 PROCEDURE — 99214 OFFICE O/P EST MOD 30 MIN: CPT | Performed by: PHYSICIAN ASSISTANT

## 2018-12-18 PROCEDURE — 85025 COMPLETE CBC W/AUTO DIFF WBC: CPT | Performed by: PHYSICIAN ASSISTANT

## 2018-12-18 PROCEDURE — 36415 COLL VENOUS BLD VENIPUNCTURE: CPT | Performed by: PHYSICIAN ASSISTANT

## 2018-12-18 PROCEDURE — 93000 ELECTROCARDIOGRAM COMPLETE: CPT | Performed by: PHYSICIAN ASSISTANT

## 2018-12-18 PROCEDURE — 83036 HEMOGLOBIN GLYCOSYLATED A1C: CPT | Performed by: PHYSICIAN ASSISTANT

## 2018-12-18 PROCEDURE — 80048 BASIC METABOLIC PNL TOTAL CA: CPT | Performed by: PHYSICIAN ASSISTANT

## 2018-12-18 ASSESSMENT — PAIN SCALES - GENERAL: PAINLEVEL: MODERATE PAIN (4)

## 2018-12-18 NOTE — PROGRESS NOTES
[facility]  [address] [facility] [id number] [phone]  Dept: [phone]    PRE-OP EVALUATION:  Today's date: 2018    Brittney Moon (: 1975) presents for pre-operative evaluation assessment as requested by Dr. Pérez.  She requires evaluation and anesthesia risk assessment prior to undergoing surgery/procedure for treatment of cyst on left wrist .    Fax number for surgical facility: [address]   [address]  Tel. [phone] / Fax [phone]  Primary Physician: No Ref-Primary, Physician  Type of Anesthesia Anticipated: Combined MAC with Local    Patient has a Health Care Directive or Living Will:  NO    Preop Questions 2018   Who is doing your surgery? robinson   What are you having done? left wrist ganglion cyst removal   Date of Surgery/Procedure: 2018   Facility or Hospital where procedure/surgery will be performed: [facility]   1.  Do you have a history of Heart attack, stroke, stent, coronary bypass surgery, or other heart surgery? No   2.  Do you ever have any pain or discomfort in your chest? No   3.  Do you have a history of  Heart Failure? No   4.   Are you troubled by shortness of breath when:  walking on a level surface, or up a slight hill, or at night? No   5.  Do you currently have a cold, bronchitis or other respiratory infection? No   6.  Do you have a cough, shortness of breath, or wheezing? No   7.  Do you sometimes get pains in the calves of your legs when you walk? No   8. Do you or anyone in your family have previous history of blood clots? No   9.  Do you or does anyone in your family have a serious bleeding problem such as prolonged bleeding following surgeries or cuts? No   10. Have you ever had problems with anemia or been told to take iron pills? No   11. Have you had any abnormal blood loss such as black, tarry or bloody stools, or abnormal vaginal bleeding?  No   12. Have you ever had a blood transfusion? YES - no complications   13. Have you or any of your relatives ever had problems with anesthesia? No   14. Do you have sleep apnea, excessive snoring or daytime drowsiness? No   15. Do you have any prosthetic heart valves? No   16. Do you have prosthetic joints? No   17. Is there any chance that you may be pregnant? No         HPI:     HPI related to upcoming procedure:   Patient is a 42 year old female who is in today for pre-operative evaluation. She is scheduled for a excisional surgery of a ganglion cyst on the left wrist on 12/24/2018. Surgery was scheduled as all attempts at conservative therapies failed. These included OTC medications, home therapies such as heat/ice, cortisone injection. Patient has pmh of T2DM, left leg amputation in childhood and obesity. Discussed with patient concerns about A1c, we will check this today.       DIABETES - Patient has a longstanding history of DiabetesType Type II . Patient is being treated with oral agents and denies significant side effects. Control has been fair to poor. Complicating factors include but are not limited to: morbid obesity and other left leg amputation.                                                                                                                            .    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 To accompany: Blood Glucose Monitor Brands: per insurance. 1 Bottle 3     blood glucose monitoring (NO BRAND SPECIFIED) meter device kit Use to test blood sugar 1-2 times daily or as directed. 1 kit 0     blood glucose monitoring (NO BRAND SPECIFIED) test strip Use to test blood sugar 1-2 times daily or as directed. To accompany: Blood Glucose Monitor Brands: per insurance. 100 strip 6     gabapentin (NEURONTIN) 300 MG capsule Take 1 tablet (300 mg) every night for 1-3 days, then 1 tablet twice daily for 1-3 days, then 1 tablet three times daily 90 capsule 0     glyBURIDE (DIABETA /MICRONASE) 5 MG tablet Take 1 tablet (5 mg) by mouth 2 times daily (with meals) 90 tablet 1     metFORMIN (GLUCOPHAGE) 500 MG tablet 2 tablets twice daily       methocarbamol (ROBAXIN) 500 MG tablet Take 1 tablet (500 mg) by mouth 4 times daily as needed for muscle spasms 1-3 tablets daily 120 tablet 2     STATIN NOT PRESCRIBED, INTENTIONAL, 1 each daily Please choose reason not prescribed, below       thin (NO BRAND SPECIFIED) lancets Use to test blood sugar 1-2 times daily or as directed. To accompany: Blood Glucose Monitor Brands: per insurance. 100 each 1     metFORMIN (GLUCOPHAGE-XR) 500 MG 24 hr tablet Take 2 tablets (1,000 mg) by mouth 2 times daily (with meals) 120 tablet 2     OTC products: no recent use of OTC ASA, NSAIDS or Steroids    Allergies   Allergen Reactions     Food      cilantro      Latex Allergy: NO    Social History     Tobacco Use     Smoking status: Never Smoker     Smokeless tobacco: Never Used   Substance Use Topics     Alcohol use: Yes     Comment: rare     History   Drug Use No       REVIEW OF SYSTEMS:   Constitutional, HEENT, cardiovascular, pulmonary, gi and gu systems are negative,  "except as otherwise noted.    EXAM:   /78 (BP Location: Left arm, Patient Position: Chair, Cuff Size: Adult Large)   Pulse 84   Temp 96.1  F (35.6  C) (Oral)   Resp 20   Ht 1.499 m (4' 11\")   BMI 62.61 kg/m      GENERAL APPEARANCE: healthy, alert and no distress     EYES: EOMI, PERRL     HENT: ear canals and TM's normal and nose and mouth without ulcers or lesions     NECK: no adenopathy, no asymmetry, masses, or scars and thyroid normal to palpation     RESP: lungs clear to auscultation - no rales, rhonchi or wheezes     CV: regular rates and rhythm, normal S1 S2, no S3 or S4 and no murmur, click or rub     ABDOMEN:  soft, nontender, no HSM or masses and bowel sounds normal     MS: extremities normal- no gross deformities noted, no evidence of inflammation in joints, FROM in all extremities.     NEURO: Normal strength and tone, sensory exam grossly normal, mentation intact and speech normal     PSYCH: mentation appears normal. and affect normal/bright    DIAGNOSTICS:     EKG: appears normal, NSR, normal axis, normal intervals, no acute ST/T changes c/w ischemia, no LVH by voltage criteria, there are no prior tracings available  Labs Resulted Today:   Results for orders placed or performed in visit on 12/18/18   Hemoglobin A1c   Result Value Ref Range    Hemoglobin A1C 8.6 (H) 0 - 5.6 %   CBC with platelets and differential   Result Value Ref Range    WBC 10.8 4.0 - 11.0 10e9/L    RBC Count 4.91 3.8 - 5.2 10e12/L    Hemoglobin 13.8 11.7 - 15.7 g/dL    Hematocrit 42.8 35.0 - 47.0 %    MCV 87 78 - 100 fl    MCH 28.1 26.5 - 33.0 pg    MCHC 32.2 31.5 - 36.5 g/dL    RDW 13.9 10.0 - 15.0 %    Platelet Count 380 150 - 450 10e9/L    % Neutrophils 70.5 %    % Lymphocytes 20.5 %    % Monocytes 5.3 %    % Eosinophils 3.3 %    % Basophils 0.4 %    Absolute Neutrophil 7.6 1.6 - 8.3 10e9/L    Absolute Lymphocytes 2.2 0.8 - 5.3 10e9/L    Absolute Monocytes 0.6 0.0 - 1.3 10e9/L    Absolute Eosinophils 0.4 0.0 - 0.7 " 10e9/L    Absolute Basophils 0.0 0.0 - 0.2 10e9/L    Diff Method Automated Method        Recent Labs   Lab Test 11/16/18  0930 10/19/18  1622   HGB  --  14.0   PLT  --  399   NA  --  138   POTASSIUM  --  3.8   CR  --  0.71   A1C 9.2* 10.4*        IMPRESSION:   Reason for surgery/procedure: Pain in left wrist  Diagnosis/reason for consult: Ganglion cyst    The proposed surgical procedure is considered INTERMEDIATE risk.    REVISED CARDIAC RISK INDEX  The patient has the following serious cardiovascular risks for perioperative complications such as (MI, PE, VFib and 3  AV Block):  No serious cardiac risks  INTERPRETATION: 0 risks: Class I (very low risk - 0.4% complication rate)    The patient has the following additional risks for perioperative complications:  Morbid obesity      ICD-10-CM    1. Preop general physical exam Z01.818 EKG 12-lead complete w/read - Clinics     Hemoglobin A1c     Basic metabolic panel  (Ca, Cl, CO2, Creat, Gluc, K, Na, BUN)     CBC with platelets and differential   2. Ganglion cyst M67.40    3. Type 2 diabetes mellitus without complication, without long-term current use of insulin (H) E11.9 DIABETES EDUCATOR REFERRAL       RECOMMENDATIONS:       Diabetes Medication Use  Patient is scheduled to take all oral medications as directed.       APPROVAL GIVEN to proceed with proposed procedure, without further diagnostic evaluation       Signed Electronically by: Pelon Cotter PA-C    Copy of this evaluation report is provided to requesting physician.    [facility] Preop Guidelines    Revised Cardiac Risk Index

## 2018-12-18 NOTE — NURSING NOTE
Health Maintenance Due   Topic Date Due     EYE EXAM Q1 YEAR  12/29/1974     MICROALBUMIN Q1 YEAR  12/29/1974     HIV SCREEN (SYSTEM ASSIGNED)  12/29/1991     DTAP/TDAP/TD IMMUNIZATION (2 - Tdap) 12/29/1998     PAP SCREENING Q3 YR (SYSTEM ASSIGNED)  08/24/2004     Domitila GEORGE LPN

## 2018-12-24 ENCOUNTER — ANESTHESIA EVENT (OUTPATIENT)
Dept: SURGERY | Facility: CLINIC | Age: 43
End: 2018-12-24
Payer: COMMERCIAL

## 2018-12-24 ENCOUNTER — ANESTHESIA (OUTPATIENT)
Dept: SURGERY | Facility: CLINIC | Age: 43
End: 2018-12-24
Payer: COMMERCIAL

## 2018-12-24 ENCOUNTER — HOSPITAL ENCOUNTER (OUTPATIENT)
Facility: CLINIC | Age: 43
Discharge: HOME | End: 2018-12-24
Attending: ORTHOPAEDIC SURGERY | Admitting: ORTHOPAEDIC SURGERY
Payer: COMMERCIAL

## 2018-12-24 VITALS — DIASTOLIC BLOOD PRESSURE: 62 MMHG | HEART RATE: 72 BPM | OXYGEN SATURATION: 92 % | SYSTOLIC BLOOD PRESSURE: 111 MMHG

## 2018-12-24 DIAGNOSIS — M67.40 GANGLION CYST: Primary | ICD-10-CM

## 2018-12-24 LAB — GLUCOSE BLDC GLUCOMTR-MCNC: 146 MG/DL (ref 70–99)

## 2018-12-24 PROCEDURE — 36000050 ZZH SURGERY LEVEL 2 1ST 30 MIN: Performed by: ORTHOPAEDIC SURGERY

## 2018-12-24 PROCEDURE — 71000027 ZZH RECOVERY PHASE 2 EACH 15 MINS: Performed by: ORTHOPAEDIC SURGERY

## 2018-12-24 PROCEDURE — 27210794 ZZH OR GENERAL SUPPLY STERILE: Performed by: ORTHOPAEDIC SURGERY

## 2018-12-24 PROCEDURE — 25000132 ZZH RX MED GY IP 250 OP 250 PS 637: Performed by: ORTHOPAEDIC SURGERY

## 2018-12-24 PROCEDURE — 25000128 H RX IP 250 OP 636: Performed by: NURSE ANESTHETIST, CERTIFIED REGISTERED

## 2018-12-24 PROCEDURE — 25000128 H RX IP 250 OP 636: Performed by: ORTHOPAEDIC SURGERY

## 2018-12-24 PROCEDURE — 82962 GLUCOSE BLOOD TEST: CPT

## 2018-12-24 PROCEDURE — 88304 TISSUE EXAM BY PATHOLOGIST: CPT | Performed by: ORTHOPAEDIC SURGERY

## 2018-12-24 PROCEDURE — 25000125 ZZHC RX 250: Performed by: NURSE ANESTHETIST, CERTIFIED REGISTERED

## 2018-12-24 PROCEDURE — 36000052 ZZH SURGERY LEVEL 2 EA 15 ADDTL MIN: Performed by: ORTHOPAEDIC SURGERY

## 2018-12-24 PROCEDURE — 40000306 ZZH STATISTIC PRE PROC ASSESS II: Performed by: ORTHOPAEDIC SURGERY

## 2018-12-24 PROCEDURE — 37000009 ZZH ANESTHESIA TECHNICAL FEE, EACH ADDTL 15 MIN: Performed by: ORTHOPAEDIC SURGERY

## 2018-12-24 PROCEDURE — 25111 REMOVE WRIST TENDON LESION: CPT | Mod: LT | Performed by: ORTHOPAEDIC SURGERY

## 2018-12-24 PROCEDURE — 37000008 ZZH ANESTHESIA TECHNICAL FEE, 1ST 30 MIN: Performed by: ORTHOPAEDIC SURGERY

## 2018-12-24 PROCEDURE — 88304 TISSUE EXAM BY PATHOLOGIST: CPT | Mod: 26 | Performed by: ORTHOPAEDIC SURGERY

## 2018-12-24 PROCEDURE — 25000132 ZZH RX MED GY IP 250 OP 250 PS 637: Performed by: PHYSICIAN ASSISTANT

## 2018-12-24 RX ORDER — BUPIVACAINE HYDROCHLORIDE 5 MG/ML
INJECTION, SOLUTION PERINEURAL PRN
Status: DISCONTINUED | OUTPATIENT
Start: 2018-12-24 | End: 2018-12-24 | Stop reason: HOSPADM

## 2018-12-24 RX ORDER — CEFAZOLIN SODIUM 1 G/3ML
1 INJECTION, POWDER, FOR SOLUTION INTRAMUSCULAR; INTRAVENOUS SEE ADMIN INSTRUCTIONS
Status: DISCONTINUED | OUTPATIENT
Start: 2018-12-24 | End: 2018-12-24 | Stop reason: HOSPADM

## 2018-12-24 RX ORDER — FENTANYL CITRATE 50 UG/ML
INJECTION, SOLUTION INTRAMUSCULAR; INTRAVENOUS PRN
Status: DISCONTINUED | OUTPATIENT
Start: 2018-12-24 | End: 2018-12-24

## 2018-12-24 RX ORDER — DIMENHYDRINATE 50 MG/ML
25 INJECTION, SOLUTION INTRAMUSCULAR; INTRAVENOUS
Status: DISCONTINUED | OUTPATIENT
Start: 2018-12-24 | End: 2018-12-24 | Stop reason: HOSPADM

## 2018-12-24 RX ORDER — ONDANSETRON 2 MG/ML
4 INJECTION INTRAMUSCULAR; INTRAVENOUS EVERY 30 MIN PRN
Status: DISCONTINUED | OUTPATIENT
Start: 2018-12-24 | End: 2018-12-24 | Stop reason: HOSPADM

## 2018-12-24 RX ORDER — FENTANYL CITRATE 50 UG/ML
25-50 INJECTION, SOLUTION INTRAMUSCULAR; INTRAVENOUS
Status: DISCONTINUED | OUTPATIENT
Start: 2018-12-24 | End: 2018-12-24 | Stop reason: HOSPADM

## 2018-12-24 RX ORDER — LIDOCAINE HYDROCHLORIDE 20 MG/ML
INJECTION, SOLUTION INFILTRATION; PERINEURAL PRN
Status: DISCONTINUED | OUTPATIENT
Start: 2018-12-24 | End: 2018-12-24

## 2018-12-24 RX ORDER — CEFAZOLIN SODIUM IN 0.9 % NACL 3 G/100 ML
3 INTRAVENOUS SOLUTION, PIGGYBACK (ML) INTRAVENOUS
Status: DISCONTINUED | OUTPATIENT
Start: 2018-12-24 | End: 2018-12-24

## 2018-12-24 RX ORDER — HYDRALAZINE HYDROCHLORIDE 20 MG/ML
2.5-5 INJECTION INTRAMUSCULAR; INTRAVENOUS EVERY 10 MIN PRN
Status: DISCONTINUED | OUTPATIENT
Start: 2018-12-24 | End: 2018-12-24 | Stop reason: HOSPADM

## 2018-12-24 RX ORDER — ONDANSETRON 2 MG/ML
INJECTION INTRAMUSCULAR; INTRAVENOUS PRN
Status: DISCONTINUED | OUTPATIENT
Start: 2018-12-24 | End: 2018-12-24

## 2018-12-24 RX ORDER — ACETAMINOPHEN 325 MG/1
975 TABLET ORAL EVERY 8 HOURS PRN
Qty: 50 TABLET | Refills: 0 | Status: SHIPPED | OUTPATIENT
Start: 2018-12-24 | End: 2019-05-13

## 2018-12-24 RX ORDER — CELECOXIB 200 MG/1
400 CAPSULE ORAL ONCE
Status: COMPLETED | OUTPATIENT
Start: 2018-12-24 | End: 2018-12-24

## 2018-12-24 RX ORDER — MEPERIDINE HYDROCHLORIDE 25 MG/ML
12.5 INJECTION INTRAMUSCULAR; INTRAVENOUS; SUBCUTANEOUS
Status: DISCONTINUED | OUTPATIENT
Start: 2018-12-24 | End: 2018-12-24 | Stop reason: HOSPADM

## 2018-12-24 RX ORDER — CEFAZOLIN SODIUM IN 0.9 % NACL 3 G/100 ML
3 INTRAVENOUS SOLUTION, PIGGYBACK (ML) INTRAVENOUS
Status: COMPLETED | OUTPATIENT
Start: 2018-12-24 | End: 2018-12-24

## 2018-12-24 RX ORDER — NALOXONE HYDROCHLORIDE 0.4 MG/ML
.1-.4 INJECTION, SOLUTION INTRAMUSCULAR; INTRAVENOUS; SUBCUTANEOUS
Status: DISCONTINUED | OUTPATIENT
Start: 2018-12-24 | End: 2018-12-24 | Stop reason: HOSPADM

## 2018-12-24 RX ORDER — SODIUM CHLORIDE, SODIUM LACTATE, POTASSIUM CHLORIDE, CALCIUM CHLORIDE 600; 310; 30; 20 MG/100ML; MG/100ML; MG/100ML; MG/100ML
INJECTION, SOLUTION INTRAVENOUS CONTINUOUS
Status: DISCONTINUED | OUTPATIENT
Start: 2018-12-24 | End: 2018-12-24 | Stop reason: HOSPADM

## 2018-12-24 RX ORDER — PROPOFOL 10 MG/ML
INJECTION, EMULSION INTRAVENOUS CONTINUOUS PRN
Status: DISCONTINUED | OUTPATIENT
Start: 2018-12-24 | End: 2018-12-24

## 2018-12-24 RX ORDER — ALBUTEROL SULFATE 0.83 MG/ML
2.5 SOLUTION RESPIRATORY (INHALATION) EVERY 4 HOURS PRN
Status: DISCONTINUED | OUTPATIENT
Start: 2018-12-24 | End: 2018-12-24 | Stop reason: HOSPADM

## 2018-12-24 RX ORDER — OXYCODONE HYDROCHLORIDE 5 MG/1
5 TABLET ORAL
Status: COMPLETED | OUTPATIENT
Start: 2018-12-24 | End: 2018-12-24

## 2018-12-24 RX ORDER — ACETAMINOPHEN 325 MG/1
975 TABLET ORAL ONCE
Status: COMPLETED | OUTPATIENT
Start: 2018-12-24 | End: 2018-12-24

## 2018-12-24 RX ORDER — PROPOFOL 10 MG/ML
INJECTION, EMULSION INTRAVENOUS PRN
Status: DISCONTINUED | OUTPATIENT
Start: 2018-12-24 | End: 2018-12-24

## 2018-12-24 RX ORDER — HYDROMORPHONE HYDROCHLORIDE 1 MG/ML
.3-.5 INJECTION, SOLUTION INTRAMUSCULAR; INTRAVENOUS; SUBCUTANEOUS EVERY 10 MIN PRN
Status: DISCONTINUED | OUTPATIENT
Start: 2018-12-24 | End: 2018-12-24 | Stop reason: HOSPADM

## 2018-12-24 RX ORDER — OXYCODONE HYDROCHLORIDE 5 MG/1
5-10 TABLET ORAL EVERY 4 HOURS PRN
Qty: 20 TABLET | Refills: 0 | Status: SHIPPED | OUTPATIENT
Start: 2018-12-24 | End: 2019-05-13

## 2018-12-24 RX ORDER — ONDANSETRON 4 MG/1
4 TABLET, ORALLY DISINTEGRATING ORAL EVERY 30 MIN PRN
Status: DISCONTINUED | OUTPATIENT
Start: 2018-12-24 | End: 2018-12-24 | Stop reason: HOSPADM

## 2018-12-24 RX ADMIN — FENTANYL CITRATE 50 MCG: 50 INJECTION INTRAMUSCULAR; INTRAVENOUS at 08:48

## 2018-12-24 RX ADMIN — SODIUM CHLORIDE, POTASSIUM CHLORIDE, SODIUM LACTATE AND CALCIUM CHLORIDE: 600; 310; 30; 20 INJECTION, SOLUTION INTRAVENOUS at 07:09

## 2018-12-24 RX ADMIN — CEFAZOLIN 3 G: 1 INJECTION, POWDER, FOR SOLUTION INTRAMUSCULAR; INTRAVENOUS at 07:32

## 2018-12-24 RX ADMIN — OXYCODONE HYDROCHLORIDE 5 MG: 5 TABLET ORAL at 09:09

## 2018-12-24 RX ADMIN — PROPOFOL 150 MCG/KG/MIN: 10 INJECTION, EMULSION INTRAVENOUS at 07:29

## 2018-12-24 RX ADMIN — FENTANYL CITRATE 50 MCG: 50 INJECTION, SOLUTION INTRAMUSCULAR; INTRAVENOUS at 07:23

## 2018-12-24 RX ADMIN — MIDAZOLAM 2 MG: 1 INJECTION INTRAMUSCULAR; INTRAVENOUS at 07:23

## 2018-12-24 RX ADMIN — CELECOXIB 400 MG: 200 CAPSULE ORAL at 09:09

## 2018-12-24 RX ADMIN — PROPOFOL 30 MG: 10 INJECTION, EMULSION INTRAVENOUS at 07:31

## 2018-12-24 RX ADMIN — ACETAMINOPHEN 975 MG: 325 TABLET ORAL at 09:09

## 2018-12-24 RX ADMIN — PROPOFOL 50 MG: 10 INJECTION, EMULSION INTRAVENOUS at 07:29

## 2018-12-24 RX ADMIN — ONDANSETRON 4 MG: 2 INJECTION INTRAMUSCULAR; INTRAVENOUS at 08:04

## 2018-12-24 RX ADMIN — FENTANYL CITRATE 25 MCG: 50 INJECTION, SOLUTION INTRAMUSCULAR; INTRAVENOUS at 08:05

## 2018-12-24 RX ADMIN — LIDOCAINE HYDROCHLORIDE 1 ML: 10 INJECTION, SOLUTION EPIDURAL; INFILTRATION; INTRACAUDAL; PERINEURAL at 07:09

## 2018-12-24 RX ADMIN — LIDOCAINE HYDROCHLORIDE 60 MG: 20 INJECTION, SOLUTION INFILTRATION; PERINEURAL at 07:29

## 2018-12-24 RX ADMIN — FENTANYL CITRATE 25 MCG: 50 INJECTION, SOLUTION INTRAMUSCULAR; INTRAVENOUS at 07:28

## 2018-12-24 ASSESSMENT — LIFESTYLE VARIABLES: TOBACCO_USE: 0

## 2018-12-24 NOTE — DISCHARGE INSTRUCTIONS
[facility] Same-Day Surgery   Adult Discharge Orders & Instructions     For 24 hours after surgery    1. Get plenty of rest.  A responsible adult must stay with you for at least 24 hours after you leave the hospital.   2. Do not drive or use heavy equipment.  If you have weakness or tingling, don't drive or use heavy equipment until this feeling goes away.  3. Do not drink alcohol.  4. Avoid strenuous or risky activities.  Ask for help when climbing stairs.   5. You may feel lightheaded.  If so, sit for a few minutes before standing.  Have someone help you get up.   6. You may have a slight fever. Call the doctor if your fever is over 100 F (37.7 C) (taken under the tongue) or lasts longer than 24 hours.  7. You may have a dry mouth, a sore throat, muscle aches or trouble sleeping.  These should go away after 24 hours.  8. Do not make important or legal decisions.  Based on the surgery/procedure that you had today, we do not expect that you will have any problems.  However, we want you to know what to do if you have pain, nausea, bleeding,or infection:  To control pain:  Take medicines your physician has prescribed or or over-the counter medicine he or she advises.  Ice packs and periods of rest are often helpful.  For surgery on an arm or leg, raise it on a pillow to ease swelling.  If your pain is not managed with the above methods, contact your physician.  To control nausea:  Take anti-nausea medicine approved by your physician.  Drink clear liquids such as apple juice, ginger ale, broth or 7-Up. Be sure to drink enough fluids.  Move to a regular diet as you feel able.  Rest may also help.  Bleeding:  You may see a little blood on your dressing, about the size of a quarter in the first 24 hours.  If you see this, there is no reason to be alarmed.  However, if this continues to increase in size, apply pressure if able, and notify your physician.  Infection: Please contact your physician if you have any  of the following signs:  redness, swelling, heat, increasing pain or foul-smelling drainage at your surgery site, fever or chills.    Call your doctor for any of the followin.  It has been over 8 to 10 hours since surgery and you are still not able to urinate (pass water).    2.  Headache for over 24 hours.    3.  Numbness, tingling or weakness in your legs the day after surgery (if you had spinal anesthesia).    Nurse advice line: [phone]

## 2018-12-24 NOTE — ANESTHESIA POSTPROCEDURE EVALUATION
Patient: Brittney Moon    Procedure(s):  EXCISION LEFT WRIST GANGLION CYST    Diagnosis:left wrist ganglion cyst  Diagnosis Additional Information: No value filed.    Anesthesia Type:  No value filed.    Note:  Anesthesia Post Evaluation    Patient location during evaluation: Phase 2  Patient participation: Able to fully participate in evaluation  Level of consciousness: awake  Pain control: being treated with IV and oral meds.  Pt is having some.  Airway patency: patent  Cardiovascular status: acceptable and hemodynamically stable  Respiratory status: acceptable, room air and nonlabored ventilation  Hydration status: stable  PONV: none     Anesthetic complications: None    Comments: Patient was happy with the anesthesia care received and no anesthesia related complications were noted.  She is sitting up and eating a muffin at this time.  She is having some back pain from the surgical positions despite it being only supine and she helped position herself.  I will follow up with the patient again if it is needed.        Last vitals:  Vitals:    12/24/18 0835 12/24/18 0850   BP: 127/87 111/62   Pulse: 85 79   SpO2: 95% 97%         Electronically Signed By: MARIA ANTONIA Carr CRNA  December 24, 2018  10:12 AM

## 2018-12-24 NOTE — ANESTHESIA CARE TRANSFER NOTE
Patient: Brittney Moon    Procedure(s):  EXCISION LEFT WRIST GANGLION CYST    Diagnosis: left wrist ganglion cyst  Diagnosis Additional Information: No value filed.    Anesthesia Type:   No value filed.     Note:  Airway :Face Mask  Patient transferred to:Phase II  Handoff Report: Identifed the Patient, Identified the Reponsible Provider, Reviewed the pertinent medical history, Discussed the surgical course, Reviewed Intra-OP anesthesia mangement and issues during anesthesia, Set expectations for post-procedure period and Allowed opportunity for questions and acknowledgement of understanding      Vitals: (Last set prior to Anesthesia Care Transfer)    CRNA VITALS  12/24/2018 0803 - 12/24/2018 0903      12/24/2018             Resp Rate (observed):  20                Electronically Signed By: MARIA ANTONIA Carr CRNA  December 24, 2018  10:12 AM

## 2018-12-24 NOTE — ANESTHESIA PREPROCEDURE EVALUATION
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 Pt has had prior anesthetic.     No history of anesthetic complications          ROS/MED HX    ENT/Pulmonary:     (+)sleep apnea, uses CPAP , . .   (-) tobacco use   Neurologic:  - neg neurologic ROS     Cardiovascular:     (+) ----. : . . . :. . Previous cardiac testing date:results:date: results:ECG reviewed date:18 results:Sinus Rhythm   -consider old inferior infarct -Old anterior infarct.    - Nonspecific T-abnormality. 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  She does have chronic pain from this area        GI/Hepatic:  - neg GI/hepatic ROS      (-) GERD   Renal/Genitourinary:  - ROS Renal section negative       Endo:     (+) type II DM Not using insulin Normal glucose range: was 146 this AM Obesity, .      Psychiatric:  - neg psychiatric ROS       Infectious Disease:  - neg infectious disease ROS       Malignancy:      - no malignancy   Other:    - neg other ROS                      Physical Exam  Normal systems: cardiovascular, pulmonary and dental    Airway   Mallampati: II  TM distance: >3 FB  Neck ROM: limited    Dental     Cardiovascular   Rhythm and rate: regular and normal      Pulmonary    breath sounds clear to auscultation            Lab Results   Component Value Date    WBC 10.8 2018    HGB 13.8 2018    HCT 42.8 2018    PLT  "380 12/18/2018     12/18/2018    POTASSIUM 4.0 12/18/2018    CHLORIDE 104 12/18/2018    CO2 27 12/18/2018    BUN 18 12/18/2018    CR 0.85 12/18/2018     (H) 12/18/2018    IMELDA 8.9 12/18/2018    TSH 2.61 10/19/2018    HCG Negative 12/07/2008       Preop Vitals  BP Readings from Last 3 Encounters:   12/18/18 132/78   12/14/18 130/80   12/07/18 126/89    Pulse Readings from Last 3 Encounters:   12/18/18 84   11/16/18 76   10/19/18 72      Resp Readings from Last 3 Encounters:   12/18/18 20   11/16/18 16   10/19/18 20    SpO2 Readings from Last 3 Encounters:   No data found for SpO2      Temp Readings from Last 1 Encounters:   12/18/18 96.1  F (35.6  C) (Oral)    Ht Readings from Last 1 Encounters:   12/18/18 1.499 m (4' 11\")      Wt Readings from Last 1 Encounters:   12/14/18 140.6 kg (310 lb)    Estimated body mass index is 62.61 kg/m  as calculated from the following:    Height as of 12/18/18: 1.499 m (4' 11\").    Weight as of 12/14/18: 140.6 kg (310 lb).       Anesthesia Plan      History & Physical Review  History and physical reviewed and following examination; no interval change.    ASA Status:  3 .    NPO Status:  > 8 hours    Plan for MAC with Intravenous and Propofol induction. Maintenance will be TIVA.  Reason for MAC:  Deep or markedly invasive procedure (G8)  PONV prophylaxis:  Ondansetron (or other 5HT-3)  Pt has significant sleep apnea risks.  Will monitor her airway closely      Postoperative Care  Postoperative pain management:  IV analgesics and Oral pain medications.      Consents  Anesthetic plan, risks, benefits and alternatives discussed with:  Patient.  Use of blood products discussed: No .   .                 MARIA ANTONIA Carr CRNA  "

## 2018-12-24 NOTE — CONSULTS
ORTHOPEDIC CONSULT      Chief Complaint: Brittney Moon is a 42 year old female who is being seen for left wrist mass    History of Present Illness:   Seen in the preoperative area.  Care was shifted to myself this morning.  In order to facilitate surgery being done efficiently at her convenience she has agreed for me to proceed.  Complains of a left wrist mass along the volar radial aspect.  Is been present a couple of months.  Bothersome when she bumps it.  Bothersome when she is typing.  Painful when she flexes her wrist.  Attempted aspiration in the office was unsuccessful.  The pain is impacting her life.  She has an MRI.  No peripheral numbness and tingling.    History of diabetes.  Diabetic control has been improving over the last few months.  She has had a previous leg amputation.  Utilizes a motorized wheelchair      Patient's past medical, surgical, social and family histories reviewed.     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   Current Outpatient Medications on File Prior to Encounter:  gabapentin (NEURONTIN) 300 MG capsule Take 1 tablet (300 mg) every night for 1-3 days, then 1 tablet twice daily for 1-3 days, then 1 tablet three times daily   glyBURIDE (DIABETA /MICRONASE) 5 MG tablet Take 1 tablet (5 mg) by mouth 2 times daily (with meals)   methocarbamol (ROBAXIN) 500 MG tablet Take 1 tablet (500 mg) by mouth 4 times daily as needed for muscle spasms 1-3 tablets daily   alcohol swab prep pads Use to swab area of injection/tez as directed   blood glucose calibration (NO BRAND SPECIFIED) solution Use to calibrate blood glucose monitor as needed as directed. To accompany: Blood  Glucose Monitor Brands: per insurance.   blood glucose monitoring (NO BRAND SPECIFIED) meter device kit Use to test blood sugar 1-2 times daily or as directed.   blood glucose monitoring (NO BRAND SPECIFIED) test strip Use to test blood sugar 1-2 times daily or as directed. To accompany: Blood Glucose Monitor Brands: per insurance.   metFORMIN (GLUCOPHAGE-XR) 500 MG 24 hr tablet Take 2 tablets (1,000 mg) by mouth 2 times daily (with meals)   STATIN NOT PRESCRIBED, INTENTIONAL, 1 each daily Please choose reason not prescribed, below   thin (NO BRAND SPECIFIED) lancets Use to test blood sugar 1-2 times daily or as directed. To accompany: Blood Glucose Monitor Brands: per insurance.       Allergies   Allergen Reactions     Food      cilantro       Social History     Occupational History     Not on file   Tobacco Use     Smoking status: Never Smoker     Smokeless tobacco: Never Used   Substance and Sexual Activity     Alcohol use: Yes     Comment: rare     Drug use: No     Sexual activity: No       History reviewed. No pertinent family history.    REVIEW OF SYSTEMS  10 point review systems performed otherwise negative as noted as per history of present illness.    Physical Exam:   Constitutional: healthy, alert and no acute distress   Psychiatric: mentation appears normal and affect normal/bright  NEURO: no focal deficits  RESP: Normal with easy respirations and no use of accessory muscles to breathe, no audible wheezing or retractions  CV: LUE:   no edema         Regular rate and rhythm by palpation  SKIN: No erythema, rashes, excoriation, or breakdown. No evidence of infection.   JOINT/EXTREMITIES:left wrist: Full active range of motion.  No gross deformity.  Fingers are warm and dry with good capillary refill.  Flexion does cause some pain along the volar radial aspect.  Radial artery is palpable.  Immediately adjacent to it is sensitive mass.  Approximate 1 x 2 cm by palpation.  There is no overlying skin dimpling  or changes.     GAIT: not tested     Diagnostic Modalities:  left wrist X-ray: No fracture, dislocation and or lesions. Normal alignment.  left wrist MRI: No fractures or dislocations.  1.1 x 0.6x 1.0 cm multiloculated volar radial ganglion cyst.  Its at the level of the radiocarpal joint.  Is immediately adjacent to the radial artery on the ulnar aspect of the cyst.  Independent visualization of the images was performed.      Impression: left wrist ganglion cyst    Plan:  All of the above pertinent physical exam and imaging modalities findings was reviewed with Brittney.    I reviewed her treatment options.  She is at attempted aspiration, rest, anti-inflammatories, Tylenol yet still continues to have symptoms.  She has pain with activity pain when she bumps the wrist.  With the symptoms in mind, we discussed a left wrist ganglion cyst excision.  The risks, benefits, complications discussed.  Risk including bleeding, infection, radial artery injury, numbness along the incision, recurrence reviewed.  We also discussed her diabetic control.  She has been able to improve her A1c from 10 to 8.6.  I did review this place that her elevated risk for infection.  Postoperative blood sugar control is imperative for wound healing.  She verbalized understanding.    Return to clinic 10 days post-operatively. , or sooner as needed for changes.  Re-x-ray on return: No    Sampson Pino D.O.

## 2018-12-24 NOTE — BRIEF OP NOTE
[facility] Brief Operative Note    Pre-operative diagnosis: left wrist ganglion cyst   Post-operative diagnosis: Same   Procedure: Procedure(s):  EXCISION LEFT WRIST GANGLION CYST   Surgeon:  Anesthesia: Kehinde Pino, DO  Local with MAC   Assistant(s): MARIA ANTONIA Corrigan, CNP, DNP   Estimated blood loss:  Fluids:  TT/pressure: Less than 30 ml  800 ml Crystalloids  25 minutes at 250 mmhg   Specimens: Multiloculated ganglion cyst approx 1cm x 1cm   Findings: See dictated operative report for full details [id number]     Sampson Pino D.O.

## 2018-12-26 LAB — COPATH REPORT: NORMAL

## 2018-12-26 NOTE — OR NURSING
[facility]  Discharge Call Back  Brittney Moon  1975  MRN: [id number]  Home: [phone] (home)   PCP: No Ref-Primary, Physician    We are calling to see how you're doing since your surgery/procedure with us?   Comments: good  Clinical Questions  1. Have you had time to look at your discharge instructions? Do you have any questions in regards to the instructions?   Comment: no  2. Do you feel your pain is being controlled with the regimen the surgeon sent you home on? (ie: prescription medications, over the counter pain medications, ice packs)   Comments: yes  3. Have you noticed any drainage on your dressing? Do you know what to do if you have bleeding as a result of your procedure?   Comments: no,its been dry  4. Have you had any nausea/vomiting? Do you know how to treat this?   Comment: no  5. Have you had any signs/symptoms of infection? (ie: fever, swelling, heat, drainage or redness) Do you know what to do if you have?   Comment: no  6. Do you have a follow up appointment made with your surgeon? Do you have a number to contact them at if you need it?   Comment: yes  Retained Foreign Object (CASANDRA, Hemovac, Penrose, Wound Packing, Vaginal Packing, Nasal Splints, Urethral Stents, Yang Catheter)  1. Do you still have n/a in place?   2. If the item is still in place, can you review the plan for removal with me? n/a  Recognition  Is there anyone from your care team that you would like to recognize?   Comments: yes all the staff was very genuine and caring.  Improvement  Our goal is to be the best. Do you have any suggestions for things that we could improve on?   Comments: no  You may be randomly selected to fill out a [facility] survey. We would appreciate you taking the time to fill this out. It is important to us if you would answer all of the questions on the survey.

## 2018-12-27 NOTE — OP NOTE
"Procedure Date: 12/24/2018      PREOPERATIVE DIAGNOSIS:  Left wrist ganglion cyst.      POSTOPERATIVE DIAGNOSIS:  Left wrist ganglion cyst.      PROCEDURE:  Excision left wrist ganglion cyst.      SURGEON:  Kehinde Pino DO      FIRST ASSISTANT:  MARIA ANTONIA Corrigan, CNP.      ANESTHESIA:  Local with IV sedation.      COMPLICATIONS:  None.      ESTIMATED BLOOD LOSS:  Less than 30 mL.      FLUIDS:  800 mL crystalloid.      TOURNIQUET TIME AND PRESSURE:  25 minutes at 250 mmHg.      COUNTS:  Correct.      DISPOSITION:  To PACU in stable condition.      GROSS FINDINGS:  There was a multiloculated ganglion cyst approximately 1 cm x 1 cm just radial to the radial artery.      NOTE:  This is a 42-year-old female complaining of wrist pain.  It has been present for a couple months.  Pain along the cyst worse with flexion as well as when she \"bumps it.\"  She had failed conservative care, including anti-inflammatories, Tylenol, an attempted aspiration with injection.  She has an MRI, which was clinically correlated.  Given her continued symptoms refractory to conservative care with pain impacting the quality of her life, we discussed a wrist ganglion cyst excision.  The risks, benefits and complications were reviewed.  Risks including recurrence, bleeding, injury to the radial artery and infection discussed.  Once this was thoroughly reviewed, she opted to proceed.      DETAILS OF PROCEDURE:  The appropriate extremity was marked and the consent was signed and she was wheeled to operative suite #1.  Transferred to the OR table without any issues.  When deemed appropriate by Anesthesia, under aseptic condition, after a timeout was performed, the area was anesthetized with 10 mL of 0.5% Marcaine.  The left upper extremity was then sterilely prepped and draped in a normal manner.  Prior to incision, a secondary timeout was performed.  The arm was elevated, tourniquet was inflated.  A longitudinal skin incision was made, centered " over the mass.  A combination of blunt and sharp dissection brought it down until the ganglion cyst was identified.  It appeared to have its origin from the radiocarpal joint.  Dissecting this out, the specimen was removed and the stalk was cauterized.  Tourniquet was deflated.  There was noted a vein that was oozing.  Tourniquet was reinflated and the vein was tied off.  Again, the tourniquet was deflated.  Hemostasis had been achieved.  There was a palpable radial artery distal to the dissection.  The fingers were all warm and dry with excellent cap refill.  The area was irrigated.  The deep tissue was approximated with 3-0 Vicryl, followed with closure of nylon suture.  A sterile bandage was applied.  She was subsequently transferred to the PACU in stable condition.  She will be discharged home with Tylenol and oxycodone for pain.  Followup appointment has been scheduled and discharge instructions provided.         ARNOLD DUMONT DO             D: 2018   T: 2018   MT: INES      Name:     KARLA CRUZ   MRN:      [id number]        Account:        [id number]   :      1975           Procedure Date: 2018      Document: [id number]

## 2019-01-02 ENCOUNTER — OFFICE VISIT (OUTPATIENT)
Dept: ORTHOPEDICS | Facility: CLINIC | Age: 44
End: 2019-01-02
Payer: COMMERCIAL

## 2019-01-02 VITALS — TEMPERATURE: 97.4 F

## 2019-01-02 DIAGNOSIS — M67.40 GANGLION CYST: Primary | ICD-10-CM

## 2019-01-02 PROCEDURE — 99024 POSTOP FOLLOW-UP VISIT: CPT | Performed by: ORTHOPAEDIC SURGERY

## 2019-01-02 ASSESSMENT — PAIN SCALES - GENERAL: PAINLEVEL: MODERATE PAIN (4)

## 2019-01-02 NOTE — PROGRESS NOTES
"Orthopedic Clinic Post-Operative Note    CHIEF COMPLAINT:   Chief Complaint   Patient presents with     Surgical Followup     DOS:12/24/2018~Excision left wrist ganglion cyst~9 days postop       HISTORY OF PRESENT ILLNESS  Returns to clinic. States initially after surgery was using the hand quite a bit as she is wheelchair bound and the left hand controls the wheelchairs. States overdid it with the left hand and wrist and had \"quite a bit\" of bruising and was sore initially after surgery. However over the past 7 days the bruising has improved, the pain has improved dramatically. States now the wrist if feeling good, has good use. Denies any numbness/tingling.  Denies any incision concerns.  No drainage or bleeding from incision.      Patient's past medical, surgical, social and family histories reviewed.     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 To accompany: Blood Glucose Monitor Brands: per insurance.   blood glucose monitoring (NO BRAND SPECIFIED) meter device kit Use to test blood sugar 1-2 times daily or as directed.   blood glucose monitoring (NO BRAND SPECIFIED) test strip Use to test blood sugar 1-2 times daily or as directed. To accompany: Blood Glucose " Monitor Brands: per insurance.   gabapentin (NEURONTIN) 300 MG capsule Take 1 tablet (300 mg) every night for 1-3 days, then 1 tablet twice daily for 1-3 days, then 1 tablet three times daily   glyBURIDE (DIABETA /MICRONASE) 5 MG tablet Take 1 tablet (5 mg) by mouth 2 times daily (with meals)   metFORMIN (GLUCOPHAGE) 500 MG tablet 2 tablets twice daily   methocarbamol (ROBAXIN) 500 MG tablet Take 1 tablet (500 mg) by mouth 4 times daily as needed for muscle spasms 1-3 tablets daily   STATIN NOT PRESCRIBED, INTENTIONAL, 1 each daily Please choose reason not prescribed, below   thin (NO BRAND SPECIFIED) lancets Use to test blood sugar 1-2 times daily or as directed. To accompany: Blood Glucose Monitor Brands: per insurance.   metFORMIN (GLUCOPHAGE-XR) 500 MG 24 hr tablet Take 2 tablets (1,000 mg) by mouth 2 times daily (with meals)     No current facility-administered medications on file prior to visit.     Allergies   Allergen Reactions     Food      cilantro       Social History     Occupational History     Not on file   Tobacco Use     Smoking status: Never Smoker     Smokeless tobacco: Never Used   Substance and Sexual Activity     Alcohol use: Yes     Comment: rare     Drug use: No     Sexual activity: No       History reviewed. No pertinent family history.    REVIEW OF SYSTEMS  General: negative for, night sweats, dizziness, fatigue  Resp: No shortness of breath and no cough  CV: negative for chest pain, syncope or near-syncope  GI: negative for nausea, vomiting and diarrhea  : negative for dysuria and hematuria  Musculoskeletal: as above  Neurologic: negative for syncope   Hematologic: negative for bleeding disorder    Physical Exam:  Vitals: Temp 97.4  F (36.3  C) (Temporal)   BMI= There is no height or weight on file to calculate BMI.  Constitutional: healthy, alert and no acute distress   Psychiatric: mentation appears normal and affect normal/bright  NEURO: no focal deficits  SKIN: .healing well, well  approximated skin edges, without signs of infection including no erythema, incision breakdown or purlent drainage  JOINT/EXTREMITIES: left wrist: moderate bruising around incision. No evidence of drainage. Mildly tender to incision.  ROM intact to the left wrist, hand, and fingers.  Cap refill <2, skin pink warm dry, fingers well perfused. Radial pulse 2+.  Sensation intact to fingers.      Diagnostic Modalities:  None today.  Independent visualization of the images was performed.      Impression:   Chief Complaint   Patient presents with     Surgical Followup     DOS:12/24/2018~Excision left wrist ganglion cyst~9 days postop   Recovering as expected.    Plan:   Activity: Discussed with patient. She admits overusing the hand initially and was sore with increased bruising but now the bruising is improving, pain is improving and feeling much better.  When over pathology results with patient: ganglion cyst.  Slow increase in activity as tolerated.  Discussed wound care.  Cover whenever getting hand/wrist dirty over the next 2-3 weeks.  Otherwise ok to keep open with showering, washing hands etc.    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 Would like a return to work unrestricted note, this was provided. States can take it easy at her own pace.    Staples/Sutures out: Yes suture removed. No steri strips needed.   Pain controlled: Yes. Continue to use: rest, Ice, elevation  Immobilzation: No  Physical Therapy: none at this time.   Return to clinic PRN, or sooner as needed for changes.    Re-x-ray on return: No    Scribed by:  MARIA ANTONIA Corrigan, CNP  11:17 AM  1/2/2019    I attest I have seen and evaluated the patient.  I agree with above impression and plan.  Sampson Pino D.O.

## 2019-01-02 NOTE — LETTER
[address] [address]  Phone: [phone]  Fax: [phone]    January 2, 2019        Brittney Moon  [address] [place] [address] [address]  [facility] [id number]          To whom it may concern:    RE: Brittney Moon    Patient may return to work  with the following:  No restrictions    Please contact me for questions or concerns.      Sincerely,        Kehinde Pino, DO

## 2019-01-02 NOTE — LETTER
"    1/2/2019         RE: Brittney Moon  [address]  [facility] [address]        Dear Colleague,    Thank you for referring your patient, Brittney Moon, to the [facility]. Please see a copy of my visit note below.    Orthopedic Clinic Post-Operative Note    CHIEF COMPLAINT:   Chief Complaint   Patient presents with     Surgical Followup     DOS:12/24/2018~Excision left wrist ganglion cyst~9 days postop       HISTORY OF PRESENT ILLNESS  Returns to clinic. States initially after surgery was using the hand quite a bit as she is wheelchair bound and the left hand controls the wheelchairs. States overdid it with the left hand and wrist and had \"quite a bit\" of bruising and was sore initially after surgery. However over the past 7 days the bruising has improved, the pain has improved dramatically. States now the wrist if feeling good, has good use. Denies any numbness/tingling.  Denies any incision concerns.  No drainage or bleeding from incision.      Patient's past medical, surgical, social and family histories reviewed.     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 To accompany: Blood Glucose Monitor Brands: per insurance.   blood glucose monitoring " (NO BRAND SPECIFIED) meter device kit Use to test blood sugar 1-2 times daily or as directed.   blood glucose monitoring (NO BRAND SPECIFIED) test strip Use to test blood sugar 1-2 times daily or as directed. To accompany: Blood Glucose Monitor Brands: per insurance.   gabapentin (NEURONTIN) 300 MG capsule Take 1 tablet (300 mg) every night for 1-3 days, then 1 tablet twice daily for 1-3 days, then 1 tablet three times daily   glyBURIDE (DIABETA /MICRONASE) 5 MG tablet Take 1 tablet (5 mg) by mouth 2 times daily (with meals)   metFORMIN (GLUCOPHAGE) 500 MG tablet 2 tablets twice daily   methocarbamol (ROBAXIN) 500 MG tablet Take 1 tablet (500 mg) by mouth 4 times daily as needed for muscle spasms 1-3 tablets daily   STATIN NOT PRESCRIBED, INTENTIONAL, 1 each daily Please choose reason not prescribed, below   thin (NO BRAND SPECIFIED) lancets Use to test blood sugar 1-2 times daily or as directed. To accompany: Blood Glucose Monitor Brands: per insurance.   metFORMIN (GLUCOPHAGE-XR) 500 MG 24 hr tablet Take 2 tablets (1,000 mg) by mouth 2 times daily (with meals)     No current facility-administered medications on file prior to visit.     Allergies   Allergen Reactions     Food      cilantro       Social History     Occupational History     Not on file   Tobacco Use     Smoking status: Never Smoker     Smokeless tobacco: Never Used   Substance and Sexual Activity     Alcohol use: Yes     Comment: rare     Drug use: No     Sexual activity: No       History reviewed. No pertinent family history.    REVIEW OF SYSTEMS  General: negative for, night sweats, dizziness, fatigue  Resp: No shortness of breath and no cough  CV: negative for chest pain, syncope or near-syncope  GI: negative for nausea, vomiting and diarrhea  : negative for dysuria and hematuria  Musculoskeletal: as above  Neurologic: negative for syncope   Hematologic: negative for bleeding disorder    Physical Exam:  Vitals: Temp 97.4  F (36.3  C) (Temporal)    BMI= There is no height or weight on file to calculate BMI.  Constitutional: healthy, alert and no acute distress   Psychiatric: mentation appears normal and affect normal/bright  NEURO: no focal deficits  SKIN: .healing well, well approximated skin edges, without signs of infection including no erythema, incision breakdown or purlent drainage  JOINT/EXTREMITIES: left wrist: moderate bruising around incision. No evidence of drainage. Mildly tender to incision.  ROM intact to the left wrist, hand, and fingers.  Cap refill <2, skin pink warm dry, fingers well perfused. Radial pulse 2+.  Sensation intact to fingers.      Diagnostic Modalities:  None today.  Independent visualization of the images was performed.      Impression:   Chief Complaint   Patient presents with     Surgical Followup     DOS:12/24/2018~Excision left wrist ganglion cyst~9 days postop   Recovering as expected.    Plan:   Activity: Discussed with patient. She admits overusing the hand initially and was sore with increased bruising but now the bruising is improving, pain is improving and feeling much better.  When over pathology results with patient: ganglion cyst.  Slow increase in activity as tolerated.  Discussed wound care.  Cover whenever getting hand/wrist dirty over the next 2-3 weeks.  Otherwise ok to keep open with showering, washing hands etc.    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 Would like a return to work unrestricted note, this was provided. States can take it easy at her own pace.    Staples/Sutures out: Yes suture removed. No steri strips needed.   Pain controlled: Yes. Continue to use: rest, Ice, elevation  Immobilzation: No  Physical Therapy: none at this time.   Return to clinic PRN, or sooner as needed for changes.    Re-x-ray on return: No    Scribed by:  MARIA ANTONIA Corrigan, CNP  11:17 AM  1/2/2019    I attest I have seen and evaluated the patient.  I agree with above impression and plan.  Sampson Pino D.O.    Again, thank you  for allowing me to participate in the care of your patient.        Sincerely,        Kehinde Pino, DO

## 2019-01-04 DIAGNOSIS — G62.9 NEUROPATHY: ICD-10-CM

## 2019-01-04 RX ORDER — GABAPENTIN 300 MG/1
300 CAPSULE ORAL 3 TIMES DAILY
Qty: 90 CAPSULE | Refills: 3 | Status: SHIPPED | OUTPATIENT
Start: 2019-01-04 | End: 2019-05-24

## 2019-01-11 DIAGNOSIS — E11.9 TYPE 2 DIABETES MELLITUS WITHOUT COMPLICATION, WITHOUT LONG-TERM CURRENT USE OF INSULIN (H): ICD-10-CM

## 2019-01-14 RX ORDER — METFORMIN HCL 500 MG
TABLET, EXTENDED RELEASE 24 HR ORAL
Qty: 120 TABLET | Refills: 0 | Status: SHIPPED | OUTPATIENT
Start: 2019-01-14 | End: 2019-02-14

## 2019-01-14 RX ORDER — GLYBURIDE 5 MG/1
TABLET ORAL
Qty: 60 TABLET | Refills: 0 | Status: SHIPPED | OUTPATIENT
Start: 2019-01-14 | End: 2019-02-14

## 2019-01-14 NOTE — TELEPHONE ENCOUNTER
"Requested Prescriptions   Pending Prescriptions Disp Refills     glyBURIDE (DIABETA /MICRONASE) 5 MG tablet [Pharmacy Med Name: GLYBURIDE 5MG TABS] 90 tablet 1    Last Written Prescription Date:  10/21/18  Last Fill Quantity: 90,  # refills: 1   Last office visit: 12/18/2018 with prescribing provider:     Future Office Visit:     Sig: TAKE ONE TABLET BY MOUTH TWICE A DAY WITH MEALS    Sulfonylurea Agents Failed - 1/11/2019  3:31 PM       Failed - Patient has had a Microalbumin in the past 12 mos.    No lab results found.         Passed - Blood pressure less than 140/90 in past 6 months    BP Readings from Last 3 Encounters:   12/24/18 111/62   12/18/18 132/78   12/14/18 130/80          Passed - Patient has documented LDL within the past 12 mos.    Recent Labs   Lab Test 10/19/18  1622   LDL 73          Passed - Patient has documented A1c within the specified period of time.    If HgbA1C is 8 or greater, it needs to be on file within the past 3 months.  If less than 8, must be on file within the past 6 months.     Recent Labs   Lab Test 12/18/18  1028   A1C 8.6*          Passed - Medication is active on med list       Passed - Patient is age 18 or older       Passed - No active pregnancy on record       Passed - Patient has a recent creatinine (normal) within the past 12 mos.    Recent Labs   Lab Test 12/18/18  1028 12/10/18  1621   CR 0.85  --    CREAT  --  0.8            Passed - Patient has not had a positive pregnancy test within the past 12 mos.       Passed - Recent (6 mo) or future (30 days) visit within the authorizing provider's specialty    Patient had office visit in the last 6 months or has a visit in the next 30 days with authorizing provider or within the authorizing provider's specialty.  See \"Patient Info\" tab in inbasket, or \"Choose Columns\" in Meds & Orders section of the refill encounter.            metFORMIN (GLUCOPHAGE-XR) 500 MG 24 hr tablet [Pharmacy Med Name: METFORMIN HCL ER 500MG TB24] 120 " "tablet 2    Last Written Prescription Date:  10/22/18  Last Fill Quantity: 120,  # refills: 2   Last office visit: 12/18/2018 with prescribing provider:     Future Office Visit:     Sig: TAKE TWO TABLETS BY MOUTH TWICE A DAY WITH MEALS    Biguanide Agents Failed - 1/11/2019  3:31 PM       Failed - Patient has had a Microalbumin in the past 15 mos.    No lab results found.         Passed - Blood pressure less than 140/90 in past 6 months    BP Readings from Last 3 Encounters:   12/24/18 111/62   12/18/18 132/78   12/14/18 130/80          Passed - Patient has documented LDL within the past 12 mos.    Recent Labs   Lab Test 10/19/18  1622   LDL 73          Passed - Patient is age 10 or older       Passed - Patient has documented A1c within the specified period of time.    If HgbA1C is 8 or greater, it needs to be on file within the past 3 months.  If less than 8, must be on file within the past 6 months.     Recent Labs   Lab Test 12/18/18  1028   A1C 8.6*          Passed - Patient's CR is NOT>1.4 OR Patient's EGFR is NOT<45 within past 12 mos.    Recent Labs   Lab Test 12/18/18  1028   GFRESTIMATED 73   GFRESTBLACK 88     Recent Labs   Lab Test 12/18/18  1028   CR 0.85          Passed - Patient does NOT have a diagnosis of CHF.       Passed - Medication is active on med list       Passed - Patient is not pregnant       Passed - Patient has not had a positive pregnancy test within the past 12 mos.        Passed - Recent (6 mo) or future (30 days) visit within the authorizing provider's specialty    Patient had office visit in the last 6 months or has a visit in the next 30 days with authorizing provider or within the authorizing provider's specialty.  See \"Patient Info\" tab in inbasket, or \"Choose Columns\" in Meds & Orders section of the refill encounter.            Routing refill request to provider for review/approval because:  Labs not current:  Microalbumin    T'd up 1 month for provider review.    Elizabet Quiñones, " RN

## 2019-01-17 ENCOUNTER — TELEPHONE (OUTPATIENT)
Dept: FAMILY MEDICINE | Facility: OTHER | Age: 44
End: 2019-01-17

## 2019-01-17 NOTE — LETTER
[facility]  [address] [facility] [address]  [phone]        Brittney Moon  [address] [place] [address]   [facility] [address]      January 29, 2019      Dear Brittney,    I care about your health and have reviewed your health plan, including your medical conditions, medication list, and lab results and am making recommendations based on this review, to better manage your health.    You are in particular need of attention regarding:  -Cervical Cancer Screening  -diabetic eye exam, microalbumin    I am recommending that you:      If you've had the preventative screening completed at another facility or feel you're not due for this screening, please call our clinic at the number listed above or send us a My Chart message so we can update our records. We would like to thank you in advance for taking the time to take care of your health.  If you have any questions, please don t hesitate to contact our clinic.    Sincerely,       [facility]

## 2019-01-17 NOTE — TELEPHONE ENCOUNTER
Panel Management Review      Patient has the following on her problem list:       Composite cancer screening  Chart review shows that this patient is due/due soon for the following   Summary:    Patient is due/failing the following:   Diabetic eye and microalbumin and PAP    Action needed:   Patient needs office visit for physical.    Type of outreach:    Sent [employer] message.    Questions for provider review:    None                                                                                                                                    Domitila GEORGE LPN       Chart routed to Domitila EGORGE LPN   .

## 2019-02-14 DIAGNOSIS — E11.9 TYPE 2 DIABETES MELLITUS WITHOUT COMPLICATION, WITHOUT LONG-TERM CURRENT USE OF INSULIN (H): ICD-10-CM

## 2019-02-15 RX ORDER — GLYBURIDE 5 MG/1
TABLET ORAL
Qty: 60 TABLET | Refills: 0 | Status: SHIPPED | OUTPATIENT
Start: 2019-02-15 | End: 2019-05-13

## 2019-02-15 RX ORDER — METFORMIN HCL 500 MG
TABLET, EXTENDED RELEASE 24 HR ORAL
Qty: 120 TABLET | Refills: 0 | Status: SHIPPED | OUTPATIENT
Start: 2019-02-15 | End: 2019-03-13

## 2019-02-15 NOTE — TELEPHONE ENCOUNTER
"Requested Prescriptions   Pending Prescriptions Disp Refills     metFORMIN (GLUCOPHAGE-XR) 500 MG 24 hr tablet [Pharmacy Med Name: METFORMIN HCL ER 500MG TB24] 120 tablet 0    Last Written Prescription Date:  1/14/19  Last Fill Quantity: 120,  # refills: 0   Last office visit: 12/18/2018 with prescribing provider:   10/19/18 Diabetes  Future Office Visit:     Sig: TAKE TWO TABLETS BY MOUTH TWICE A DAY WITH MEALS    Biguanide Agents Failed - 2/14/2019  3:25 PM       Failed - Patient has had a Microalbumin in the past 15 mos.    No lab results found.         Passed - Blood pressure less than 140/90 in past 6 months    BP Readings from Last 3 Encounters:   12/24/18 111/62   12/18/18 132/78   12/14/18 130/80          Passed - Patient has documented LDL within the past 12 mos.    Recent Labs   Lab Test 10/19/18  1622   LDL 73          Passed - Patient is age 10 or older       Passed - Patient has documented A1c within the specified period of time.    If HgbA1C is 8 or greater, it needs to be on file within the past 3 months.  If less than 8, must be on file within the past 6 months.     Recent Labs   Lab Test 12/18/18  1028   A1C 8.6*          Passed - Patient's CR is NOT>1.4 OR Patient's EGFR is NOT<45 within past 12 mos.    Recent Labs   Lab Test 12/18/18  1028   GFRESTIMATED 73   GFRESTBLACK 88     Recent Labs   Lab Test 12/18/18  1028   CR 0.85          Passed - Patient does NOT have a diagnosis of CHF.       Passed - Medication is active on med list       Passed - Patient is not pregnant       Passed - Patient has not had a positive pregnancy test within the past 12 mos.        Passed - Recent (6 mo) or future (30 days) visit within the authorizing provider's specialty    Patient had office visit in the last 6 months or has a visit in the next 30 days with authorizing provider or within the authorizing provider's specialty.  See \"Patient Info\" tab in inbasket, or \"Choose Columns\" in Meds & Orders section of the " "refill encounter.       Routing refill request to provider for review/approval because:  Labs not current:  Microalbumin  T'd up 1 month for provider review.               glyBURIDE (DIABETA /MICRONASE) 5 MG tablet [Pharmacy Med Name: GLYBURIDE 5MG TABS] 60 tablet 0    Last Written Prescription Date:  1/14/19  Last Fill Quantity: 60,  # refills: 0   Last office visit: 12/18/2018 with prescribing provider:     Future Office Visit:     Sig: TAKE ONE TABLET BY MOUTH TWICE A DAY WITH MEALS    Sulfonylurea Agents Failed - 2/14/2019  3:25 PM       Failed - Patient has had a Microalbumin in the past 12 mos.    No lab results found.         Passed - Blood pressure less than 140/90 in past 6 months    BP Readings from Last 3 Encounters:   12/24/18 111/62   12/18/18 132/78   12/14/18 130/80          Passed - Patient has documented LDL within the past 12 mos.    Recent Labs   Lab Test 10/19/18  1622   LDL 73          Passed - Patient has documented A1c within the specified period of time.    If HgbA1C is 8 or greater, it needs to be on file within the past 3 months.  If less than 8, must be on file within the past 6 months.     Recent Labs   Lab Test 12/18/18  1028   A1C 8.6*          Passed - Medication is active on med list       Passed - Patient is age 18 or older       Passed - No active pregnancy on record       Passed - Patient has a recent creatinine (normal) within the past 12 mos.    Recent Labs   Lab Test 12/18/18  1028 12/10/18  1621   CR 0.85  --    CREAT  --  0.8            Passed - Patient has not had a positive pregnancy test within the past 12 mos.       Passed - Recent (6 mo) or future (30 days) visit within the authorizing provider's specialty    Patient had office visit in the last 6 months or has a visit in the next 30 days with authorizing provider or within the authorizing provider's specialty.  See \"Patient Info\" tab in inbasket, or \"Choose Columns\" in Meds & Orders section of the refill encounter.     "        Routing refill request to provider for review/approval because:  Labs not current:  Microalbumin  T'd up 1 month for provider review.    Elizabet Quiñones RN

## 2019-02-15 NOTE — TELEPHONE ENCOUNTER
Spoke with patient and informed of message below. Patient understood and will come in next week to leave sample.     April Ventura MA

## 2019-02-15 NOTE — TELEPHONE ENCOUNTER
Please call patient to inform her that her medications have been sent out, but she is also due for microalbumin. I have placed an order for this to be completed at any time.     Pelon Cotter PA-C on 2/15/2019 at 4:13 PM

## 2019-02-19 DIAGNOSIS — E11.9 TYPE 2 DIABETES MELLITUS WITHOUT COMPLICATION, WITHOUT LONG-TERM CURRENT USE OF INSULIN (H): ICD-10-CM

## 2019-02-19 LAB
CREAT UR-MCNC: 134 MG/DL
MICROALBUMIN UR-MCNC: 14 MG/L
MICROALBUMIN/CREAT UR: 10.6 MG/G CR (ref 0–25)

## 2019-02-19 PROCEDURE — 82043 UR ALBUMIN QUANTITATIVE: CPT | Performed by: PHYSICIAN ASSISTANT

## 2019-03-13 DIAGNOSIS — E11.9 TYPE 2 DIABETES MELLITUS WITHOUT COMPLICATION, WITHOUT LONG-TERM CURRENT USE OF INSULIN (H): ICD-10-CM

## 2019-03-14 RX ORDER — METFORMIN HCL 500 MG
TABLET, EXTENDED RELEASE 24 HR ORAL
Qty: 120 TABLET | Refills: 2 | Status: SHIPPED | OUTPATIENT
Start: 2019-03-14 | End: 2019-06-01

## 2019-03-14 RX ORDER — GLYBURIDE 5 MG/1
TABLET ORAL
Qty: 60 TABLET | Refills: 2 | Status: SHIPPED | OUTPATIENT
Start: 2019-03-14 | End: 2019-06-01

## 2019-03-14 NOTE — TELEPHONE ENCOUNTER
Prescription approved per [facility] Refill Protocol.    MARIO OjedaN, RN  [facility]

## 2019-03-14 NOTE — TELEPHONE ENCOUNTER
"Requested Prescriptions   Pending Prescriptions Disp Refills     glyBURIDE (DIABETA /MICRONASE) 5 MG tablet [Pharmacy Med Name: GLYBURIDE 5MG TABS] 90 tablet 1    Last Written Prescription Date:  2/15/19  Last Fill Quantity: 60,  # refills: 0   Last office visit: 12/18/2018 with prescribing provider:  12/18/18   Future Office Visit:     Sig: TAKE ONE TABLET BY MOUTH TWICE A DAY WITH MEALS    Sulfonylurea Agents Passed - 3/13/2019  3:03 PM       Passed - Blood pressure less than 140/90 in past 6 months    BP Readings from Last 3 Encounters:   12/24/18 111/62   12/18/18 132/78   12/14/18 130/80                Passed - Patient has documented LDL within the past 12 mos.    Recent Labs   Lab Test 10/19/18  1622   LDL 73            Passed - Patient has had a Microalbumin in the past 12 mos.    Recent Labs   Lab Test 02/19/19  0745   MICROL 14   UMALCR 10.60            Passed - Patient has documented A1c within the specified period of time.    If HgbA1C is 8 or greater, it needs to be on file within the past 3 months.  If less than 8, must be on file within the past 6 months.     Recent Labs   Lab Test 12/18/18  1028   A1C 8.6*            Passed - Medication is active on med list       Passed - Patient is age 18 or older       Passed - No active pregnancy on record       Passed - Patient has a recent creatinine (normal) within the past 12 mos.    Recent Labs   Lab Test 12/18/18  1028 12/10/18  1621   CR 0.85  --    CREAT  --  0.8            Passed - Patient has not had a positive pregnancy test within the past 12 mos.       Passed - Recent (6 mo) or future (30 days) visit within the authorizing provider's specialty    Patient had office visit in the last 6 months or has a visit in the next 30 days with authorizing provider or within the authorizing provider's specialty.  See \"Patient Info\" tab in inbasket, or \"Choose Columns\" in Meds & Orders section of the refill encounter.            metFORMIN (GLUCOPHAGE-XR) 500 MG 24 " "hr tablet [Pharmacy Med Name: METFORMIN HCL ER 500MG TB24] 120 tablet 0    Last Written Prescription Date:  2/15/19  Last Fill Quantity: 120,  # refills: 0   Last office visit: 12/18/2018 with prescribing provider:  12/18/18   Future Office Visit:     Sig: TAKE TWO TABLETS BY MOUTH TWICE A DAY WITH MEALS    Biguanide Agents Passed - 3/13/2019  3:03 PM       Passed - Blood pressure less than 140/90 in past 6 months    BP Readings from Last 3 Encounters:   12/24/18 111/62   12/18/18 132/78   12/14/18 130/80                Passed - Patient has documented LDL within the past 12 mos.    Recent Labs   Lab Test 10/19/18  1622   LDL 73            Passed - Patient has had a Microalbumin in the past 15 mos.    Recent Labs   Lab Test 02/19/19  0745   MICROL 14   UMALCR 10.60            Passed - Patient is age 10 or older       Passed - Patient has documented A1c within the specified period of time.    If HgbA1C is 8 or greater, it needs to be on file within the past 3 months.  If less than 8, must be on file within the past 6 months.     Recent Labs   Lab Test 12/18/18  1028   A1C 8.6*            Passed - Patient's CR is NOT>1.4 OR Patient's EGFR is NOT<45 within past 12 mos.    Recent Labs   Lab Test 12/18/18  1028   GFRESTIMATED 73   GFRESTBLACK 88       Recent Labs   Lab Test 12/18/18  1028   CR 0.85            Passed - Patient does NOT have a diagnosis of CHF.       Passed - Medication is active on med list       Passed - Patient is not pregnant       Passed - Patient has not had a positive pregnancy test within the past 12 mos.        Passed - Recent (6 mo) or future (30 days) visit within the authorizing provider's specialty    Patient had office visit in the last 6 months or has a visit in the next 30 days with authorizing provider or within the authorizing provider's specialty.  See \"Patient Info\" tab in inbasket, or \"Choose Columns\" in Meds & Orders section of the refill encounter.            "

## 2019-04-12 DIAGNOSIS — M62.830 SPASM OF BACK MUSCLES: ICD-10-CM

## 2019-04-12 NOTE — TELEPHONE ENCOUNTER
Requested Prescriptions   Pending Prescriptions Disp Refills     methocarbamol (ROBAXIN) 500 MG tablet [Pharmacy Med Name: METHOCARBAMOL 500MG TABS]  Last Written Prescription Date:  10/22/18  Last Fill Quantity: 120,  # refills: 2   Last office visit: 12/18/2018 with prescribing provider:  Cyndee   Future Office Visit:     120 tablet 2     Sig: TAKE ONE TABLET BY MOUTH FOUR TIMES A DAY AS NEEDED       There is no refill protocol information for this order

## 2019-04-16 ENCOUNTER — MYC MEDICAL ADVICE (OUTPATIENT)
Dept: FAMILY MEDICINE | Facility: OTHER | Age: 44
End: 2019-04-16

## 2019-04-16 ENCOUNTER — TELEPHONE (OUTPATIENT)
Dept: FAMILY MEDICINE | Facility: OTHER | Age: 44
End: 2019-04-16

## 2019-04-16 RX ORDER — METHOCARBAMOL 500 MG/1
TABLET, FILM COATED ORAL
Qty: 120 TABLET | Refills: 2 | Status: SHIPPED | OUTPATIENT
Start: 2019-04-16 | End: 2019-06-30

## 2019-04-16 NOTE — TELEPHONE ENCOUNTER
Panel Management Review      Patient has the following on her problem list:       Composite cancer screening  Chart review shows that this patient is due/due soon for the following   Summary:    Patient is due/failing the following:   Diabetic eye exam and PAP    Action needed:   Pap smear and diabetic eye exam    Type of outreach:    Sent [employer] message.    Questions for provider review:    None                                                                                                                                    Domitila GEORGE LPN       Chart routed to Domitila GEORGE LPN   .

## 2019-05-13 ENCOUNTER — OFFICE VISIT (OUTPATIENT)
Dept: FAMILY MEDICINE | Facility: OTHER | Age: 44
End: 2019-05-13
Payer: COMMERCIAL

## 2019-05-13 VITALS
RESPIRATION RATE: 16 BRPM | WEIGHT: 293 LBS | HEIGHT: 61 IN | TEMPERATURE: 98.7 F | BODY MASS INDEX: 55.32 KG/M2 | HEART RATE: 72 BPM | SYSTOLIC BLOOD PRESSURE: 124 MMHG | DIASTOLIC BLOOD PRESSURE: 80 MMHG

## 2019-05-13 DIAGNOSIS — Z12.4 PAP SMEAR FOR CERVICAL CANCER SCREENING: Primary | ICD-10-CM

## 2019-05-13 DIAGNOSIS — E11.9 TYPE 2 DIABETES MELLITUS WITHOUT COMPLICATION, WITHOUT LONG-TERM CURRENT USE OF INSULIN (H): ICD-10-CM

## 2019-05-13 DIAGNOSIS — E66.01 MORBID OBESITY (H): ICD-10-CM

## 2019-05-13 LAB
CHOLEST SERPL-MCNC: 146 MG/DL
HBA1C MFR BLD: 6.7 % (ref 0–5.6)
HDLC SERPL-MCNC: 38 MG/DL
LDLC SERPL CALC-MCNC: 78 MG/DL
NONHDLC SERPL-MCNC: 108 MG/DL
TRIGL SERPL-MCNC: 150 MG/DL

## 2019-05-13 PROCEDURE — 99213 OFFICE O/P EST LOW 20 MIN: CPT | Performed by: PHYSICIAN ASSISTANT

## 2019-05-13 PROCEDURE — G0145 SCR C/V CYTO,THINLAYER,RESCR: HCPCS | Performed by: PHYSICIAN ASSISTANT

## 2019-05-13 PROCEDURE — 36415 COLL VENOUS BLD VENIPUNCTURE: CPT | Performed by: PHYSICIAN ASSISTANT

## 2019-05-13 PROCEDURE — 83036 HEMOGLOBIN GLYCOSYLATED A1C: CPT | Performed by: PHYSICIAN ASSISTANT

## 2019-05-13 PROCEDURE — 87624 HPV HI-RISK TYP POOLED RSLT: CPT | Performed by: PHYSICIAN ASSISTANT

## 2019-05-13 PROCEDURE — 80061 LIPID PANEL: CPT | Performed by: PHYSICIAN ASSISTANT

## 2019-05-13 ASSESSMENT — MIFFLIN-ST. JEOR: SCORE: 2024.48

## 2019-05-13 ASSESSMENT — PAIN SCALES - GENERAL: PAINLEVEL: MODERATE PAIN (4)

## 2019-05-13 NOTE — PROGRESS NOTES
"  SUBJECTIVE:   Brittney Moon is a 43 year old female who presents to clinic today for the following   health issues:      Concern - pap smear  Onset:     Description:   Pap smear    Intensity:     Progression of Symptoms:      Accompanying Signs & Symptoms:  none    Previous history of similar problem:       Precipitating factors:   Worsened by:     Alleviating factors:  Improved by:     Therapies Tried and outcome: nothing    Patient is in today for pap smear. Denies discharge, irritation, bleeding or lesions. Patient has been working to improve blood glucose levels with healthy diet and exercise videos. She is concerned as she feels that her waist circumference has reduced, but that her overall weight has increased. We discussed recheck A1c today as well as lipids. Patient was in agreement with this plan.     Additional history: as documented    Reviewed  and updated as needed this visit by clinical staff  Tobacco  Allergies  Meds  Med Hx  Surg Hx  Fam Hx  Soc Hx        Reviewed and updated as needed this visit by Provider      ROS:  Constitutional, HEENT, cardiovascular, pulmonary, gi and gu systems are negative, except as otherwise noted.    OBJECTIVE:     /80 (BP Location: Right leg, Patient Position: Chair, Cuff Size: Adult Regular)   Pulse 72   Temp 98.7  F (37.1  C) (Tympanic)   Resp 16   Ht 1.543 m (5' 0.75\")   Wt 144.6 kg (318 lb 12.8 oz)   LMP 04/27/2018   BMI 60.73 kg/m    Body mass index is 60.73 kg/m .  GENERAL: healthy, alert and no distress  RESP: lungs clear to auscultation - no rales, rhonchi or wheezes  CV: regular rate and rhythm, normal S1 S2, no S3 or S4, no murmur, click or rub, no peripheral edema and peripheral pulses strong   (female): normal female external genitalia, normal urethral meatus, vaginal mucosa, normal cervix/adnexa/uterus without masses or discharge  MS: no gross musculoskeletal defects noted, no edema, left leg amputated    Diagnostic Test " Results:  Results for orders placed or performed in visit on 05/13/19   Hemoglobin A1c   Result Value Ref Range    Hemoglobin A1C 6.7 (H) 0 - 5.6 %   Lipid Profile   Result Value Ref Range    Cholesterol 146 <200 mg/dL    Triglycerides 150 (H) <150 mg/dL    HDL Cholesterol 38 (L) >49 mg/dL    LDL Cholesterol Calculated 78 <100 mg/dL    Non HDL Cholesterol 108 <130 mg/dL       ASSESSMENT/PLAN:     1. Pap smear for cervical cancer screening  Will notify patient of the results. No concerns at this time.   - Pap imaged thin layer screen with HPV - recommended age 30 - 65 years (select HPV order below)    2. Type 2 diabetes mellitus without complication, without long-term current use of insulin (H)  A1c returned improved at 6.7 from 8.6 five months ago. Praised patient on her efforts. Continue with healthy diet and exercise.   - blood glucose (NO BRAND SPECIFIED) test strip; Use to test blood sugar 1-2 times daily or as directed. To accompany: Blood Glucose Monitor Brands: per insurance.  Dispense: 100 strip; Refill: 6  - Hemoglobin A1c    3. Morbid obesity (H)  The 10-year ASCVD risk score (Talbotton NELDA Jr., et al., 2013) is: 1.6%    Values used to calculate the score:      Age: 43 years      Sex: Female      Is Non- : No      Diabetic: Yes      Tobacco smoker: No      Systolic Blood Pressure: 124 mmHg      Is BP treated: No      HDL Cholesterol: 38 mg/dL      Total Cholesterol: 146 mg/dL    - Lipid Profile  - BARIATRIC ADULT REFERRAL    Follow up with clinic for annual checkup or sooner if conditions change, worsen or fail to improve as expected.      Pelon Cotter PA-C  [facility]

## 2019-05-13 NOTE — NURSING NOTE
Health Maintenance Due   Topic Date Due     EYE EXAM Q1 YEAR  12/29/1974     HIV SCREEN (SYSTEM ASSIGNED)  12/29/1991     DTAP/TDAP/TD IMMUNIZATION (2 - Tdap) 12/29/1998     MEDICARE ANNUAL WELLNESS VISIT  08/08/2002     PAP SCREENING Q3 YR (SYSTEM ASSIGNED)  08/24/2004     PHQ-2  01/01/2019     Domitila GEORGE LPN

## 2019-05-13 NOTE — PATIENT INSTRUCTIONS
Referral placed for [facility] on [address], [place] [address]    We will fax over the note from today's visit and you can call to schedule the appointment.   Phone: [phone]

## 2019-05-16 LAB
COPATH REPORT: NORMAL
PAP: NORMAL

## 2019-05-20 LAB
FINAL DIAGNOSIS: NORMAL
HPV HR 12 DNA CVX QL NAA+PROBE: NEGATIVE
HPV16 DNA SPEC QL NAA+PROBE: NEGATIVE
HPV18 DNA SPEC QL NAA+PROBE: NEGATIVE
SPECIMEN DESCRIPTION: NORMAL
SPECIMEN SOURCE CVX/VAG CYTO: NORMAL

## 2019-05-24 ENCOUNTER — MYC REFILL (OUTPATIENT)
Dept: FAMILY MEDICINE | Facility: OTHER | Age: 44
End: 2019-05-24

## 2019-05-24 DIAGNOSIS — G62.9 NEUROPATHY: ICD-10-CM

## 2019-05-24 RX ORDER — GABAPENTIN 300 MG/1
300 CAPSULE ORAL 3 TIMES DAILY
Qty: 90 CAPSULE | Refills: 3 | Status: SHIPPED | OUTPATIENT
Start: 2019-05-24 | End: 2019-12-09

## 2019-05-24 NOTE — TELEPHONE ENCOUNTER
Requested Prescriptions   Pending Prescriptions Disp Refills     gabapentin (NEURONTIN) 300 MG capsule 90 capsule 3     Sig: Take 1 capsule (300 mg) by mouth 3 times daily       There is no refill protocol information for this order

## 2019-05-24 NOTE — TELEPHONE ENCOUNTER
gabapentin (NEURONTIN) 300 MG capsule      Last Written Prescription Date:  1/4/19  Last Fill Quantity: 90,   # refills: 3  Last Office Visit: 5/13/19  Future Office visit:       Routing refill request to provider for review/approval because:  Drug not on the [facility], [facility] or [facility] refill protocol or controlled substance

## 2019-06-01 DIAGNOSIS — E11.9 TYPE 2 DIABETES MELLITUS WITHOUT COMPLICATION, WITHOUT LONG-TERM CURRENT USE OF INSULIN (H): ICD-10-CM

## 2019-06-03 RX ORDER — METFORMIN HCL 500 MG
TABLET, EXTENDED RELEASE 24 HR ORAL
Qty: 120 TABLET | Refills: 5 | Status: SHIPPED | OUTPATIENT
Start: 2019-06-03 | End: 2021-08-23

## 2019-06-03 RX ORDER — BLOOD-GLUCOSE METER
EACH MISCELLANEOUS
Qty: 1 KIT | Refills: 0 | Status: SHIPPED | OUTPATIENT
Start: 2019-06-03 | End: 2022-01-06

## 2019-06-03 RX ORDER — GLYBURIDE 5 MG/1
TABLET ORAL
Qty: 60 TABLET | Refills: 5 | Status: SHIPPED | OUTPATIENT
Start: 2019-06-03 | End: 2021-08-23

## 2019-06-03 RX ORDER — BLOOD GLUCOSE CONTROL HIGH,LOW
EACH MISCELLANEOUS
Qty: 1 EACH | Refills: 3 | Status: SHIPPED | OUTPATIENT
Start: 2019-06-03 | End: 2022-01-06

## 2019-06-03 NOTE — TELEPHONE ENCOUNTER
"Requested Prescriptions   Pending Prescriptions Disp Refills     Blood Glucose Calibration (ACCU-CHEK GUIDE CONTROL) LIQD [Pharmacy Med Name: ACCU-CHEK GUIDE CONTROL  LIQD] 1 each 0     Sig: USE AS DIRECTED TO CALIBRATE METER WITH EACH NEW BOX OF TEST STRIPS   Last Written Prescription Date:  10/21/18  Last Fill Quantity: 1 bottle,  # refills: 3   Last office visit: 5/13/2019 with prescribing provider:  5/13/19   Future Office Visit:        Diabetic Supplies Protocol Passed - 6/1/2019  5:04 AM        Passed - Medication is active on med list        Passed - Patient is 18 years of age or older        Passed - Recent (6 mo) or future (30 days) visit within the authorizing provider's specialty     Patient had office visit in the last 6 months or has a visit in the next 30 days with authorizing provider.  See \"Patient Info\" tab in inbasket, or \"Choose Columns\" in Meds & Orders section of the refill encounter.            glyBURIDE (DIABETA /MICRONASE) 5 MG tablet [Pharmacy Med Name: GLYBURIDE 5MG TABS] 60 tablet 2     Sig: TAKE ONE TABLET BY MOUTH TWICE A DAY WITH MEALS   Last Written Prescription Date:  3/14/19  Last Fill Quantity: 60,  # refills: 2   Last office visit: 5/13/2019 with prescribing provider:  5/13/19   Future Office Visit:        Sulfonylurea Agents Passed - 6/1/2019  5:04 AM        Passed - Blood pressure less than 140/90 in past 6 months     BP Readings from Last 3 Encounters:   05/13/19 124/80   12/24/18 111/62   12/18/18 132/78                 Passed - Patient has documented LDL within the past 12 mos.     Recent Labs   Lab Test 05/13/19  0922   LDL 78             Passed - Patient has had a Microalbumin in the past 15 mos.     Recent Labs   Lab Test 02/19/19  0745   MICROL 14   UMALCR 10.60             Passed - Patient has documented A1c within the specified period of time.     If HgbA1C is 8 or greater, it needs to be on file within the past 3 months.  If less than 8, must be on file within the past " "6 months.     Recent Labs   Lab Test 05/13/19  0922   A1C 6.7*             Passed - Medication is active on med list        Passed - Patient is age 18 or older        Passed - No active pregnancy on record        Passed - Patient has a recent creatinine (normal) within the past 12 mos.     Recent Labs   Lab Test 12/18/18  1028 12/10/18  1621   CR 0.85  --    CREAT  --  0.8             Passed - Patient has not had a positive pregnancy test within the past 12 mos.        Passed - Recent (6 mo) or future (30 days) visit within the authorizing provider's specialty     Patient had office visit in the last 6 months or has a visit in the next 30 days with authorizing provider or within the authorizing provider's specialty.  See \"Patient Info\" tab in inbasket, or \"Choose Columns\" in Meds & Orders section of the refill encounter.            metFORMIN (GLUCOPHAGE-XR) 500 MG 24 hr tablet [Pharmacy Med Name: METFORMIN HCL ER 500MG TB24] 120 tablet 2     Sig: TAKE TWO TABLETS BY MOUTH TWICE A DAY WITH MEALS   Last Written Prescription Date:  3/14/19  Last Fill Quantity: 120,  # refills: 2   Last office visit: 5/13/2019 with prescribing provider:  5/13/19   Future Office Visit:        Biguanide Agents Passed - 6/1/2019  5:04 AM        Passed - Blood pressure less than 140/90 in past 6 months     BP Readings from Last 3 Encounters:   05/13/19 124/80   12/24/18 111/62   12/18/18 132/78                 Passed - Patient has documented LDL within the past 12 mos.     Recent Labs   Lab Test 05/13/19  0922   LDL 78             Passed - Patient has had a Microalbumin in the past 15 mos.     Recent Labs   Lab Test 02/19/19  0745   MICROL 14   UMALCR 10.60             Passed - Patient is age 10 or older        Passed - Patient has documented A1c within the specified period of time.     If HgbA1C is 8 or greater, it needs to be on file within the past 3 months.  If less than 8, must be on file within the past 6 months.     Recent Labs   Lab " "Test 05/13/19  0922   A1C 6.7*             Passed - Patient's CR is NOT>1.4 OR Patient's EGFR is NOT<45 within past 12 mos.     Recent Labs   Lab Test 12/18/18  1028   GFRESTIMATED 73   GFRESTBLACK 88       Recent Labs   Lab Test 12/18/18  1028   CR 0.85             Passed - Patient does NOT have a diagnosis of CHF.        Passed - Medication is active on med list        Passed - Patient is not pregnant        Passed - Patient has not had a positive pregnancy test within the past 12 mos.         Passed - Recent (6 mo) or future (30 days) visit within the authorizing provider's specialty     Patient had office visit in the last 6 months or has a visit in the next 30 days with authorizing provider or within the authorizing provider's specialty.  See \"Patient Info\" tab in inbasket, or \"Choose Columns\" in Meds & Orders section of the refill encounter.            Blood Glucose Monitoring Suppl (ACCU-CHEK GUIDE) w/Device KIT [Pharmacy Med Name: ACCU-CHEK GUIDE W/DEVICE KIT]  0     Sig: USE TO TEST BLOOD SUGARS ONCE DAILY TO TWO TIMES A DAY   Last Written Prescription Date:  10/21/18  Last Fill Quantity: 1 kit,  # refills: 0   Last office visit: 5/13/2019 with prescribing provider:  5/13/19   Future Office Visit:        Diabetic Supplies Protocol Passed - 6/1/2019  5:04 AM        Passed - Medication is active on med list        Passed - Patient is 18 years of age or older        Passed - Recent (6 mo) or future (30 days) visit within the authorizing provider's specialty     Patient had office visit in the last 6 months or has a visit in the next 30 days with authorizing provider.  See \"Patient Info\" tab in inbasket, or \"Choose Columns\" in Meds & Orders section of the refill encounter.            "

## 2019-06-03 NOTE — TELEPHONE ENCOUNTER
Prescription approved per [facility] Refill Protocol.    MARIO OjedaN, RN  [facility]

## 2019-06-26 ENCOUNTER — MYC MEDICAL ADVICE (OUTPATIENT)
Dept: FAMILY MEDICINE | Facility: OTHER | Age: 44
End: 2019-06-26

## 2019-06-26 DIAGNOSIS — E66.01 MORBID OBESITY (H): Primary | ICD-10-CM

## 2019-06-27 NOTE — TELEPHONE ENCOUNTER
Please call patient to inform her that I have placed a referral for [place] bariatric consultation. She may schedule at any time.    Pelon Cotter PA-C on 6/27/2019 at 8:42 AM

## 2019-06-30 DIAGNOSIS — M62.830 SPASM OF BACK MUSCLES: ICD-10-CM

## 2019-07-01 RX ORDER — METHOCARBAMOL 500 MG/1
TABLET, FILM COATED ORAL
Qty: 120 TABLET | Refills: 2 | Status: SHIPPED | OUTPATIENT
Start: 2019-07-01 | End: 2019-10-30

## 2019-07-01 NOTE — TELEPHONE ENCOUNTER
Requested Prescriptions   Pending Prescriptions Disp Refills     methocarbamol (ROBAXIN) 500 MG tablet [Pharmacy Med Name: METHOCARBAMOL 500MG TABS] 120 tablet 2     Sig: TAKE ONE TABLET BY MOUTH FOUR TIMES A DAY AS NEEDED       There is no refill protocol information for this order        Last Written Prescription Date:  4/16/2019  Last Fill Quantity: 120,  # refills: 2   Last office visit: 5/13/2019 with prescribing provider:  Cyndee   Future Office Visit:      Spasm of back muscles [M62.830]   Routing refill request to provider for review/approval because:  Drug not on [place] [facility] [place]     Elizabet Quiñones RN

## 2019-07-11 ENCOUNTER — OFFICE VISIT (OUTPATIENT)
Dept: FAMILY MEDICINE | Facility: OTHER | Age: 44
End: 2019-07-11
Payer: COMMERCIAL

## 2019-07-11 VITALS
BODY MASS INDEX: 58.85 KG/M2 | HEART RATE: 80 BPM | SYSTOLIC BLOOD PRESSURE: 118 MMHG | WEIGHT: 293 LBS | TEMPERATURE: 97 F | RESPIRATION RATE: 16 BRPM | DIASTOLIC BLOOD PRESSURE: 70 MMHG

## 2019-07-11 DIAGNOSIS — M70.61 TROCHANTERIC BURSITIS OF RIGHT HIP: Primary | ICD-10-CM

## 2019-07-11 PROCEDURE — 99213 OFFICE O/P EST LOW 20 MIN: CPT | Performed by: PHYSICIAN ASSISTANT

## 2019-07-11 RX ORDER — PREDNISONE 20 MG/1
20 TABLET ORAL 2 TIMES DAILY
Qty: 10 TABLET | Refills: 0 | Status: SHIPPED | OUTPATIENT
Start: 2019-07-11 | End: 2019-07-16

## 2019-07-11 ASSESSMENT — PAIN SCALES - GENERAL: PAINLEVEL: SEVERE PAIN (7)

## 2019-07-11 NOTE — NURSING NOTE
Health Maintenance Due   Topic Date Due     EYE EXAM  1975     HIV SCREENING  12/29/1988     DTAP/TDAP/TD IMMUNIZATION (2 - Tdap) 12/29/1998     Domitila GEORGE LPN

## 2019-07-11 NOTE — PROGRESS NOTES
Subjective     Brittney Moon is a 43 year old female who presents to clinic today for the following health issues:    HPI   Joint Pain    Onset: 1 week    Description:   Location: right hip  Character: Sharp    Intensity: severe    Progression of Symptoms: worse    Accompanying Signs & Symptoms:  Other symptoms: weakness of right hip    History:   Previous similar pain: no       Precipitating factors:   Trauma or overuse: no     Alleviating factors:  Improved by: heat    Therapies Tried and outcome: heat, tylenol; slight relief.ice no relief      Patient is a 43 year old female who presents with 1 week of right hip pain. This has been particularly bothersome for the patient as her left leg is amputated and she relies on her right leg constantly throughout the day. She notes that the right hip is painful when sitting and increases with standing or activity. It is also painful when she lays on the joint at night. She cannot recall specific injury or event that triggered the pain. Denies worsening numbness, weakness in the RLE, radiation of pain into buttocks or back, changes in bowel or bladder.           Reviewed and updated as needed this visit by Provider         Review of Systems   ROS COMP: Constitutional, HEENT, cardiovascular, pulmonary, gi and gu systems are negative, except as otherwise noted.      Objective    /70 (BP Location: Left arm, Patient Position: Chair, Cuff Size: Adult Large)   Pulse 80   Temp 97  F (36.1  C) (Oral)   Resp 16   Wt 140.1 kg (308 lb 14.4 oz)   BMI 58.85 kg/m    Body mass index is 58.85 kg/m .  Physical Exam   GENERAL: healthy, alert and no distress  RESP: lungs clear to auscultation - no rales, rhonchi or wheezes  CV: regular rate and rhythm, normal S1 S2, no S3 or S4, no murmur, click or rub, no peripheral edema and peripheral pulses strong  MS: no gross musculoskeletal defects noted, no edema, sharp tenderness to palpation over right greater trochanter, minimal  "tenderness to palpation over right ischial tuberosity, normal AROM of right hip  NEURO: Normal strength and tone, mentation intact and speech normal  PSYCH: mentation appears normal, affect normal/bright    Diagnostic Test Results:  Labs reviewed in Epic        Assessment & Plan     1. Trochanteric bursitis of right hip  Discussed with patient use of ice and heat to help the inflammation reduce. Prednisone to provide some immediate pain relief, but patient will likely require injection given inability to rest the joint. Cautioned patient on adverse effects of the medication including elevated blood sugars.   - predniSONE (DELTASONE) 20 MG tablet; Take 1 tablet (20 mg) by mouth 2 times daily for 5 days  Dispense: 10 tablet; Refill: 0  - ORTHOPEDICS ADULT REFERRAL     BMI:   Estimated body mass index is 58.85 kg/m  as calculated from the following:    Height as of 5/13/19: 1.543 m (5' 0.75\").    Weight as of this encounter: 140.1 kg (308 lb 14.4 oz).   Weight management plan: Currently making efforts to diet and lose weight. Down 10lbs from previous visit        Follow up with clinic for annual checkup or sooner if conditions change, worsen or fail to improve as expected.      No follow-ups on file.    Pelon Cotter PA-C  [facility]      "
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
## 2019-07-16 ENCOUNTER — OFFICE VISIT (OUTPATIENT)
Dept: ORTHOPEDICS | Facility: CLINIC | Age: 44
End: 2019-07-16
Attending: PHYSICIAN ASSISTANT
Payer: COMMERCIAL

## 2019-07-16 ENCOUNTER — TELEPHONE (OUTPATIENT)
Dept: ORTHOPEDICS | Facility: OTHER | Age: 44
End: 2019-07-16

## 2019-07-16 VITALS
BODY MASS INDEX: 55.32 KG/M2 | HEIGHT: 61 IN | SYSTOLIC BLOOD PRESSURE: 151 MMHG | WEIGHT: 293 LBS | HEART RATE: 86 BPM | DIASTOLIC BLOOD PRESSURE: 78 MMHG

## 2019-07-16 DIAGNOSIS — M70.61 GREATER TROCHANTERIC BURSITIS OF RIGHT HIP: Primary | ICD-10-CM

## 2019-07-16 PROCEDURE — 20610 DRAIN/INJ JOINT/BURSA W/O US: CPT | Mod: RT | Performed by: PHYSICAL MEDICINE & REHABILITATION

## 2019-07-16 PROCEDURE — 99243 OFF/OP CNSLTJ NEW/EST LOW 30: CPT | Mod: 25 | Performed by: PHYSICAL MEDICINE & REHABILITATION

## 2019-07-16 RX ORDER — TRIAMCINOLONE ACETONIDE 40 MG/ML
40 INJECTION, SUSPENSION INTRA-ARTICULAR; INTRAMUSCULAR
Status: DISCONTINUED | OUTPATIENT
Start: 2019-07-16 | End: 2019-10-07

## 2019-07-16 RX ADMIN — TRIAMCINOLONE ACETONIDE 40 MG: 40 INJECTION, SUSPENSION INTRA-ARTICULAR; INTRAMUSCULAR at 11:04

## 2019-07-16 ASSESSMENT — MIFFLIN-ST. JEOR: SCORE: 1978.66
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
## 2019-07-16 NOTE — PATIENT INSTRUCTIONS
Today's Plan of Care:  -Steroid injection performed today.  Take it easy over the next few days. Keep in mind that the steroid may take up to 3 days to start working and up to 2 weeks to reach maximal effect.  Ice 15-20 minutes as needed for soreness.  Patient's preferred over the counter medication as needed for pain as directed on packaging.  -Rehab: [facility] [phone]. Please do 5-6 days of exercises per week between formal sessions and the home exercises they provide.    Follow Up: As needed if symptoms fail to improve or worsen. Please call with any questions or concerns.

## 2019-08-01 ENCOUNTER — HOSPITAL ENCOUNTER (OUTPATIENT)
Dept: PHYSICAL THERAPY | Facility: OTHER | Age: 44
Setting detail: THERAPIES SERIES
End: 2019-08-01
Attending: PHYSICAL MEDICINE & REHABILITATION
Payer: COMMERCIAL

## 2019-08-01 PROCEDURE — 97161 PT EVAL LOW COMPLEX 20 MIN: CPT | Mod: GP | Performed by: PHYSICAL THERAPIST

## 2019-08-01 PROCEDURE — 97110 THERAPEUTIC EXERCISES: CPT | Mod: GP | Performed by: PHYSICAL THERAPIST
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
## 2019-08-12 ENCOUNTER — TRANSFERRED RECORDS (OUTPATIENT)
Dept: HEALTH INFORMATION MANAGEMENT | Facility: CLINIC | Age: 44
End: 2019-08-12

## 2019-08-28 ENCOUNTER — MYC MEDICAL ADVICE (OUTPATIENT)
Dept: FAMILY MEDICINE | Facility: OTHER | Age: 44
End: 2019-08-28

## 2019-09-09 NOTE — ADDENDUM NOTE
Encounter addended by: Cece Dolan, PT on: 9/9/2019 10:28 AM   Actions taken: Episode resolved, Sign clinical note

## 2019-09-09 NOTE — PROGRESS NOTES
Outpatient Physical Therapy Discharge Note     Patient: Brittney Moon  : 1975    Beginning/End Dates of Reporting Period:  2019 to 2019    Referring Provider: caesar hernandez MD     Therapy Diagnosis: hip pain      Client Self Report:      Objective Measurements:    Goals:  Goal Identifier 1   Goal Description instruction in HEP and compliant with it 5 of 7 days    Target Date 10/01/19   Date Met      Progress:     Goal Identifier 2   Goal Description patient to have reduction in pain level 4-8/10 goal is 0-4/10   Target Date 10/01/19   Date Met      Progress:       Progress Toward Goals:   Progress this reporting period: patient seen for evaluation only she cancelled her 2 follow up Rx and has made no further contact with PT           Plan:  Discharge from therapy.    Discharge:    Reason for Discharge: Patient chooses to discontinue therapy.  Patient has failed to schedule further appointments.    Equipment Issued: none    Discharge Plan: Patient to continue home program.

## 2019-10-07 ENCOUNTER — OFFICE VISIT (OUTPATIENT)
Dept: FAMILY MEDICINE | Facility: OTHER | Age: 44
End: 2019-10-07
Payer: COMMERCIAL

## 2019-10-07 VITALS
WEIGHT: 293 LBS | SYSTOLIC BLOOD PRESSURE: 136 MMHG | BODY MASS INDEX: 58.92 KG/M2 | HEART RATE: 72 BPM | DIASTOLIC BLOOD PRESSURE: 80 MMHG | RESPIRATION RATE: 20 BRPM | TEMPERATURE: 96 F

## 2019-10-07 DIAGNOSIS — Z23 NEED FOR PROPHYLACTIC VACCINATION AND INOCULATION AGAINST INFLUENZA: ICD-10-CM

## 2019-10-07 DIAGNOSIS — M51.369 BULGING LUMBAR DISC: ICD-10-CM

## 2019-10-07 DIAGNOSIS — E11.9 TYPE 2 DIABETES MELLITUS WITHOUT COMPLICATION, WITHOUT LONG-TERM CURRENT USE OF INSULIN (H): ICD-10-CM
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
## 2019-10-07 LAB — HBA1C MFR BLD: 6.7 % (ref 0–5.6)

## 2019-10-07 PROCEDURE — 36415 COLL VENOUS BLD VENIPUNCTURE: CPT | Performed by: PHYSICIAN ASSISTANT

## 2019-10-07 PROCEDURE — 90471 IMMUNIZATION ADMIN: CPT | Performed by: PHYSICIAN ASSISTANT

## 2019-10-07 PROCEDURE — 90686 IIV4 VACC NO PRSV 0.5 ML IM: CPT | Performed by: PHYSICIAN ASSISTANT

## 2019-10-07 PROCEDURE — 99213 OFFICE O/P EST LOW 20 MIN: CPT | Mod: 25 | Performed by: PHYSICIAN ASSISTANT

## 2019-10-07 PROCEDURE — 83036 HEMOGLOBIN GLYCOSYLATED A1C: CPT | Performed by: PHYSICIAN ASSISTANT

## 2019-10-07 RX ORDER — TIZANIDINE 2 MG/1
2 TABLET ORAL 3 TIMES DAILY PRN
Qty: 60 TABLET | Refills: 1 | Status: SHIPPED | OUTPATIENT
Start: 2019-10-07 | End: 2021-08-23

## 2019-10-07 ASSESSMENT — PAIN SCALES - GENERAL: PAINLEVEL: SEVERE PAIN (6)

## 2019-10-07 NOTE — PROGRESS NOTES
Subjective     Brittney Moon is a 43 year old female who presents to clinic today for the following health issues:    HPI   ED/UC Followup:    Facility:  [facility] in [place], [place]  Date of visit: August 12, 2019  Reason for visit: fall on right hip  Current Status: no relief         Patient is a 43 year old female who presents today for follow up of recent ED visit. She was seen at the [facility] ED in [place] on Aug 12th for fall out of chair. Patient states that the fall occurred while trying to transfer herself from a sewing chair to her wheelchair. She fell onto her right hip which she had been experiencing pain in prior to this and received treatment for trochanteric bursitis. Patient brought a summary of the CT scan of her lumbar spine completed during her ED visit. This will be scanned into the records. Findings on the summary state small disc bulges at L4/5 and L5/S1. She states that since the fall she has felt discomfort in her lower lumbar spine and over the SI joints. Denies numbness, tingling, weakness in her right leg (left leg has been amputated), changes in bladder or bowel.     Reviewed and updated as needed this visit by Provider         Review of Systems   ROS COMP: Constitutional, HEENT, cardiovascular, pulmonary, gi and gu systems are negative, except as otherwise noted.      Objective    /80 (BP Location: Right arm, Patient Position: Chair, Cuff Size: Adult Large)   Pulse 72   Temp 96  F (35.6  C) (Oral)   Resp 20   Wt 140.3 kg (309 lb 4.8 oz)   BMI 58.92 kg/m    Body mass index is 58.92 kg/m .  Physical Exam   GENERAL: healthy, alert and no distress  NECK: no adenopathy, no asymmetry, masses, or scars and thyroid normal to palpation  RESP: lungs clear to auscultation - no rales, rhonchi or wheezes  CV: regular rate and rhythm, normal S1 S2, no S3 or S4, no murmur, click or rub, no peripheral edema and peripheral pulses strong  MS: left leg amputated, no edema  NEURO: Normal  strength and tone, mentation intact and speech normal  Comprehensive back pain exam:  Tenderness of lumbar spine and paraspinal muscles, Pain limits the following motions: lateral bend and rotation and Lower extremity sensation normal over right leg and absent on left    Diagnostic Test Results:  Results for orders placed or performed in visit on 10/07/19 (from the past 24 hour(s))   Hemoglobin A1c   Result Value Ref Range    Hemoglobin A1C 6.7 (H) 0 - 5.6 %           Assessment & Plan       ICD-10-CM    1. Fall, subsequent encounter W19.XXXD SPINE SURGERY REFERRAL     tiZANidine (ZANAFLEX) 2 MG tablet   2. Bulging lumbar disc M51.26 SPINE SURGERY REFERRAL     tiZANidine (ZANAFLEX) 2 MG tablet   3. Type 2 diabetes mellitus without complication, without long-term current use of insulin (H) E11.9 Hemoglobin A1c   4. Need for prophylactic vaccination and inoculation against influenza Z23 INFLUENZA VACCINE IM > 6 MONTHS VALENT IIV4 [25906]     Vaccine Administration, Initial [56895]        Patient will meet with spine specialty to discuss treatment options to reduce her pain/discomfort in her lower back. She was given educational information on back care and advised to utilize heat/ice as needed. Patient will have alternative muscle relaxant to use for better pain control. Cautioned not to use both robaxin and tizanidine, but to chose only one medicine to use each day. A1c will be rechecked today and patient will be updated on any medication adjustments and follow up.     No follow-ups on file.    Pelon Cotter PA-C  [facility]

## 2019-10-07 NOTE — NURSING NOTE
Health Maintenance Due   Topic Date Due     EYE EXAM  1975     HIV SCREENING  12/29/1988     PNEUMOCOCCAL IMMUNIZATION 19-64 MEDIUM RISK (1 of 1 - PPSV23) 12/29/1992     DTAP/TDAP/TD IMMUNIZATION (2 - Tdap) 12/29/1998     MEDICARE ANNUAL WELLNESS VISIT  08/08/2002     INFLUENZA VACCINE (1) 09/01/2019     Domitila GEORGE LPN

## 2019-10-07 NOTE — PATIENT INSTRUCTIONS
Patient Education     Back Care Tips    Caring for your back  These are things you can do to prevent a recurrence of acute back pain and to reduce symptoms from chronic back pain:    Maintain a healthy weight. If you are overweight, losing weight will help most types of back pain.    Exercise is an important part of recovery from most types of back pain. The muscles behind and in front of the spine support the back. This means strengthening both the back muscles and the abdominal muscles will provide better support for your spine.     Swimming and brisk walking are good overall exercises to improve your fitness level.    Practice safe lifting methods (below).    Practice good posture when sitting, standing and walking. Avoid prolonged sitting. This puts more stress on the lower back than standing or walking.    Wear quality shoes with sufficient arch support. Foot and ankle alignment can affect back symptoms. Women should avoid wearing high heels.    Therapeutic massage can help relax the back muscles without stretching them.    During the first 24 to 72 hours after an acute injury or flare-up of chronic back pain, apply an ice pack to the painful area for 20 minutes and then remove it for 20 minutes, over a period of 60 to 90 minutes, or several times a day. As a safety precaution, do not use a heating pad at bedtime. Sleeping on a heating pad can lead to skin burns or tissue damage.    You can alternate ice and heat therapies.  Medicines  Talk to your healthcare provider before using medicines, especially if you have other medical problems or are taking other medicines.    You may use acetaminophen or ibuprofen to control pain, unless your healthcare provider prescribed other pain medicine. If you have chronic conditions like diabetes, liver or kidney disease, stomach ulcers, or gastrointestinal bleeding, or are taking blood thinners, talk with your healthcare provider before taking any medicines.    Be careful  if you are given prescription pain medicines, narcotics, or medicine for muscle spasm. They can cause drowsiness, affect your coordination, reflexes, and judgment. Do not drive or operate heavy machinery while taking these types of medicines. Take prescription pain medicine only as prescribed by your healthcare provider.  Lumbar stretch  Here is a simple stretching exercise that will help relax muscle spasm and keep your back more limber. If exercise makes your back pain worse, don t do it.    Lie on your back with your knees bent and both feet on the ground.    Slowly raise your left knee to your chest as you flatten your lower back against the floor. Hold for 5 seconds.    Relax and repeat the exercise with your right knee.    Do 10 of these exercises for each leg.  Safe lifting method    Don t bend over at the waist to lift an object off the floor.  Instead, bend your knees and hips in a squat.     Keep your back and head upright    Hold the object close to your body, directly in front of you.    Straighten your legs to lift the object.     Lower the object to the floor in the reverse fashion.    If you must slide something across the floor, push it.  Posture tips  Sitting  Sit in chairs with straight backs or low-back support. Keep your knees lower than your hips, with your feet flat on the floor.  When driving, sit up straight. Adjust the seat forward so you are not leaning toward the steering wheel.  A small pillow or rolled towel behind your lower back may help if you are driving long distances.   Standing  When standing for long periods, shift most of your weight to one leg at a time. Alternate legs every few minutes.   Sleeping  The best way to sleep is on your side with your knees bent. Put a low pillow under your head to support your neck in a neutral spine position. Avoid thick pillows that bend your neck to one side. Put a pillow between your legs to further relax your lower back. If you sleep on your  back, put pillows under your knees to support your legs in a slightly flexed position. Use a firm mattress. If your mattress sags, replace it, or use a 1/2-inch plywood board under the mattress to add support.  Follow-up care  Follow up with your healthcare provider, or as advised.  If X-rays, a CT scan or an MRI scan were taken, they will be reviewed by a radiologist. You will be notified of any new findings that may affect your care.  Call 911  Call 911 if any of the following occur:    Trouble breathing    Confusion    Very drowsy    Fainting or loss of consciousness    Rapid or very slow heart rate    Loss of  bowel or bladder control  When to seek medical advice  Call your healthcare provider right away if any of the following occur:    Pain becomes worse or spreads to your arms or legs    Weakness or numbness in one or both arms or legs    Numbness in the groin area  Date Last Reviewed: 6/1/2016 2000-2018 [employer]. [address] [place] [address], [place] [address]. All rights reserved. This information is not intended as a substitute for professional medical care. Always follow your healthcare professional's instructions.

## 2019-10-24 ENCOUNTER — OFFICE VISIT (OUTPATIENT)
Dept: NEUROSURGERY | Facility: CLINIC | Age: 44
End: 2019-10-24
Attending: PHYSICIAN ASSISTANT
Payer: COMMERCIAL

## 2019-10-24 VITALS
SYSTOLIC BLOOD PRESSURE: 132 MMHG | TEMPERATURE: 97.8 F | RESPIRATION RATE: 17 BRPM | HEIGHT: 60 IN | OXYGEN SATURATION: 100 % | DIASTOLIC BLOOD PRESSURE: 82 MMHG | WEIGHT: 293 LBS | BODY MASS INDEX: 57.52 KG/M2 | HEART RATE: 88 BPM

## 2019-10-24 DIAGNOSIS — M54.16 LUMBAR RADICULOPATHY: Primary | ICD-10-CM

## 2019-10-24 PROCEDURE — 99203 OFFICE O/P NEW LOW 30 MIN: CPT | Performed by: NURSE PRACTITIONER

## 2019-10-24 RX ORDER — METHYLPREDNISOLONE 4 MG
TABLET, DOSE PACK ORAL
Qty: 21 TABLET | Refills: 0 | Status: SHIPPED | OUTPATIENT
Start: 2019-10-24 | End: 2019-11-22

## 2019-10-24 ASSESSMENT — PAIN SCALES - GENERAL: PAINLEVEL: MODERATE PAIN (5)

## 2019-10-24 ASSESSMENT — MIFFLIN-ST. JEOR: SCORE: 1968.11

## 2019-10-24 NOTE — PROGRESS NOTES
Brittney Moon is a 43 year old female who presents for:  Chief Complaint   Patient presents with     Neurologic Problem     Bulging lumbar disc        Initial Vitals:  /82   Pulse 88   Temp 97.8  F (36.6  C) (Temporal)   Resp 17   Ht 1.524 m (5')   Wt 140.2 kg (309 lb)   SpO2 100%   BMI 60.35 kg/m   Estimated body mass index is 60.35 kg/m  as calculated from the following:    Height as of this encounter: 1.524 m (5').    Weight as of this encounter: 140.2 kg (309 lb).. Body surface area is 2.44 meters squared. BP completed using cuff size: regular  Moderate Pain (5)                Stefani Angulo CMA

## 2019-10-24 NOTE — PROGRESS NOTES
[facility] Neurosurgery  Neurosurgery Clinic Visit      CC: low back pain s/p fall    Primary care Provider: No Ref-Primary, Physician      Reason For Visit:   I was asked by Pelon Cotter PA-C to consult on the patient for fall, subsequent encounter, bulging lumbar disc.      HPI: Brittney Moon is a 43 year old female with 5-6 months of low back and right hip pain. She states she sustained a standing height fall in August when she attempted to transfer between chairs. She presented to [facility] ED and was evaluated and a CT was negative for fracture but revealed some small disc bulges. Today she presents noting low back and right hip pain. She has foot paresthesias due to peripheral neuropathy. She reports right leg weakness as well. She denies bowel/bladder complaints and foot drop. She states pain is worse with prolonged sitting and immobility. She has undergone PT in the past 6 months and stretches with some relief of symptoms. She has also tried muscle relaxants. She has not had an MRI, injection, or surgery.    Pain at its worst 7  Pain right now:  5    Past Medical History:   Diagnosis Date     Other convulsions     seizure disorder     Unspecified osteomyelitis, lower leg        Past Medical History reviewed with patient during visit.    Past Surgical History:   Procedure Laterality Date     C AMPUTATION LOW LEG THRU TIB/FIB      Below knee amputation secondary to osteomyelitis     EXCISE GANGLION WRIST Left 12/24/2018    Procedure: EXCISION LEFT WRIST GANGLION CYST;  Surgeon: Kehinde Pino DO;  Location: [facility] OR     Past Surgical History reviewed with patient during visit.    Current Outpatient Medications   Medication     alcohol swab prep pads     blood glucose (NO BRAND SPECIFIED) test strip     Blood Glucose Calibration (ACCU-CHEK GUIDE CONTROL) LIQD     blood glucose calibration (NO BRAND SPECIFIED) solution     blood glucose monitoring (NO BRAND SPECIFIED) meter device kit      Blood Glucose Monitoring Suppl (ACCU-CHEK GUIDE) w/Device KIT     gabapentin (NEURONTIN) 300 MG capsule     glyBURIDE (DIABETA /MICRONASE) 5 MG tablet     metFORMIN (GLUCOPHAGE-XR) 500 MG 24 hr tablet     methocarbamol (ROBAXIN) 500 MG tablet     STATIN NOT PRESCRIBED, INTENTIONAL,     thin (NO BRAND SPECIFIED) lancets     tiZANidine (ZANAFLEX) 2 MG tablet     No current facility-administered medications for this visit.        Allergies   Allergen Reactions     Food      cilantro       Social History     Socioeconomic History     Marital status: Single     Spouse name: Not on file     Number of children: Not on file     Years of education: Not on file     Highest education level: Not on file   Occupational History     Not on file   Social Needs     Financial resource strain: Not on file     Food insecurity:     Worry: Not on file     Inability: Not on file     Transportation needs:     Medical: Not on file     Non-medical: Not on file   Tobacco Use     Smoking status: Never Smoker     Smokeless tobacco: Never Used   Substance and Sexual Activity     Alcohol use: Yes     Comment: rare     Drug use: No     Sexual activity: Never   Lifestyle     Physical activity:     Days per week: Not on file     Minutes per session: Not on file     Stress: Not on file   Relationships     Social connections:     Talks on phone: Not on file     Gets together: Not on file     Attends Nondenominational service: Not on file     Active member of club or organization: Not on file     Attends meetings of clubs or organizations: Not on file     Relationship status: Not on file     Intimate partner violence:     Fear of current or ex partner: Not on file     Emotionally abused: Not on file     Physically abused: Not on file     Forced sexual activity: Not on file   Other Topics Concern     Parent/sibling w/ CABG, MI or angioplasty before 65F 55M? Not Asked   Social History Narrative     Not on file       No family history on file.      ROS: 10 point  ROS neg other than the symptoms noted above in the HPI.    Vital Signs:       Examination:  Constitutional:  Alert, well nourished, NAD.  Memory: recent and remote memory   HEENT: Normocephalic, atraumatic.   Pulm:  Without shortness of breath   CV:  No pitting edema of BLE.      Neurological:  Awake  Alert  Oriented x 3  Speech clear  Tongue midline    Motor exam:  5/5 RLE, AKA LLE     Sensation normal to bilateral upper and lower extremities  Muscle tone to bilateral upper and lower extremities   Gait: Unable to ambulate due to AKA.    Lumbar examination reveals tenderness of the spine or paraspinous muscles. Negative SI joint, sciatic notch or greater trochanteric tenderness to palpation bilaterally.  Straight leg raise is negative.    Imaging: Lumbar CT 8/13/2019  IMPRESSION:  No evidence of a gross acute lumbar spine fracture. Focal central deformities at the L4 and L5 inferior endplates appear nonacute. Correlate clinically.    Assessment/Plan:   Brittney Moon is a 43 year old female with 5-6 months of low back and right hip pain. She states she sustained a standing height fall in August when she attempted to transfer between chairs. She presented to [place] ED and was evaluated and a CT was negative for fracture but revealed some small disc bulges. Today she presents noting low back and right hip pain. She has foot paresthesias due to peripheral neuropathy. She reports right leg weakness as well. She denies bowel/bladder complaints and foot drop. Discussed lumbar CT results. Due to continued symptoms despite conservative management with PT and medication therapy, recommend lumbar MRI at this time. I will call her with the results and further recommendations. She verbalized understanding and agreement.    Patient Instructions   -Lumbar MRI ordered. Please schedule. I will contact you with the results.  -Please contact the clinic if pain persists at [phone].      Deysi Gallegos, [facility]  [address]  [address] [place] [address], [place] [address]  Tel [phone]  Fax [phone]

## 2019-10-24 NOTE — PATIENT INSTRUCTIONS
-Lumbar MRI ordered. Please schedule. I will contact you with the results.  -Please contact the clinic if pain persists at [phone].

## 2019-10-24 NOTE — LETTER
10/24/2019         RE: Brittney Moon  [address]  [address]  [facility] [address]        Dear Colleague,    Thank you for referring your patient, Brittney Moon, to the [facility]. Please see a copy of my visit note below.    Lumbar [facility]  Neurosurgery Clinic Visit      CC: low back pain s/p fall    Primary care Provider: No Ref-Primary, Physician      Reason For Visit:   I was asked by Pelon Cotter PA-C to consult on the patient for fall, subsequent encounter, bulging lumbar disc.      HPI: Brittney Moon is a 43 year old female with 5-6 months of low back and right hip pain. She states she sustained a standing height fall in August when she attempted to transfer between chairs. She presented to [facility] ED and was evaluated and a CT was negative for fracture but revealed some small disc bulges. Today she presents noting low back and right hip pain. She has foot paresthesias due to peripheral neuropathy. She reports right leg weakness as well. She denies bowel/bladder complaints and foot drop. She states pain is worse with prolonged sitting and immobility. She has undergone PT in the past 6 months and stretches with some relief of symptoms. She has also tried muscle relaxants. She has not had an MRI, injection, or surgery.    Pain at its worst 7  Pain right now:  5    Past Medical History:   Diagnosis Date     Other convulsions     seizure disorder     Unspecified osteomyelitis, lower leg        Past Medical History reviewed with patient during visit.    Past Surgical History:   Procedure Laterality Date     C AMPUTATION LOW LEG THRU TIB/FIB      Below knee amputation secondary to osteomyelitis     EXCISE GANGLION WRIST Left 12/24/2018    Procedure: EXCISION LEFT WRIST GANGLION CYST;  Surgeon: Kehinde Pino DO;  Location: [facility] OR     Past Surgical History reviewed with patient during visit.    Current Outpatient Medications   Medication     alcohol swab prep  pads     blood glucose (NO BRAND SPECIFIED) test strip     Blood Glucose Calibration (ACCU-CHEK GUIDE CONTROL) LIQD     blood glucose calibration (NO BRAND SPECIFIED) solution     blood glucose monitoring (NO BRAND SPECIFIED) meter device kit     Blood Glucose Monitoring Suppl (ACCU-CHEK GUIDE) w/Device KIT     gabapentin (NEURONTIN) 300 MG capsule     glyBURIDE (DIABETA /MICRONASE) 5 MG tablet     metFORMIN (GLUCOPHAGE-XR) 500 MG 24 hr tablet     methocarbamol (ROBAXIN) 500 MG tablet     STATIN NOT PRESCRIBED, INTENTIONAL,     thin (NO BRAND SPECIFIED) lancets     tiZANidine (ZANAFLEX) 2 MG tablet     No current facility-administered medications for this visit.        Allergies   Allergen Reactions     Food      cilantro       Social History     Socioeconomic History     Marital status: Single     Spouse name: Not on file     Number of children: Not on file     Years of education: Not on file     Highest education level: Not on file   Occupational History     Not on file   Social Needs     Financial resource strain: Not on file     Food insecurity:     Worry: Not on file     Inability: Not on file     Transportation needs:     Medical: Not on file     Non-medical: Not on file   Tobacco Use     Smoking status: Never Smoker     Smokeless tobacco: Never Used   Substance and Sexual Activity     Alcohol use: Yes     Comment: rare     Drug use: No     Sexual activity: Never   Lifestyle     Physical activity:     Days per week: Not on file     Minutes per session: Not on file     Stress: Not on file   Relationships     Social connections:     Talks on phone: Not on file     Gets together: Not on file     Attends Jainism service: Not on file     Active member of club or organization: Not on file     Attends meetings of clubs or organizations: Not on file     Relationship status: Not on file     Intimate partner violence:     Fear of current or ex partner: Not on file     Emotionally abused: Not on file     Physically  abused: Not on file     Forced sexual activity: Not on file   Other Topics Concern     Parent/sibling w/ CABG, MI or angioplasty before 65F 55M? Not Asked   Social History Narrative     Not on file       No family history on file.      ROS: 10 point ROS neg other than the symptoms noted above in the HPI.    Vital Signs:       Examination:  Constitutional:  Alert, well nourished, NAD.  Memory: recent and remote memory   HEENT: Normocephalic, atraumatic.   Pulm:  Without shortness of breath   CV:  No pitting edema of BLE.      Neurological:  Awake  Alert  Oriented x 3  Speech clear  Tongue midline    Motor exam:  5/5 RLE, AKA LLE     Sensation normal to bilateral upper and lower extremities  Muscle tone to bilateral upper and lower extremities   Gait: Unable to ambulate due to AKA.    Lumbar examination reveals tenderness of the spine or paraspinous muscles. Negative SI joint, sciatic notch or greater trochanteric tenderness to palpation bilaterally.  Straight leg raise is negative.    Imaging: Lumbar CT 8/13/2019  IMPRESSION:  No evidence of a gross acute lumbar spine fracture. Focal central deformities at the L4 and L5 inferior endplates appear nonacute. Correlate clinically.    Assessment/Plan:   Brittney Moon is a 43 year old female with 5-6 months of low back and right hip pain. She states she sustained a standing height fall in August when she attempted to transfer between chairs. She presented to [facility] ED and was evaluated and a CT was negative for fracture but revealed some small disc bulges. Today she presents noting low back and right hip pain. She has foot paresthesias due to peripheral neuropathy. She reports right leg weakness as well. She denies bowel/bladder complaints and foot drop. Discussed lumbar CT results. Due to continued symptoms despite conservative management with PT and medication therapy, recommend lumbar MRI at this time. I will call her with the results and further recommendations. She  verbalized understanding and agreement.    Patient Instructions   -Lumbar MRI ordered. Please schedule. I will contact you with the results.  -Please contact the clinic if pain persists at [phone].      ALICE Zarate [facility]  [address] [place] [address]  [address] [place] [id number]  Tel [phone]  Fax [phone]      Brittney Moon is a 43 year old female who presents for:  Chief Complaint   Patient presents with     Neurologic Problem     Bulging lumbar disc        Initial Vitals:  /82   Pulse 88   Temp 97.8  F (36.6  C) (Temporal)   Resp 17   Ht 1.524 m (5')   Wt 140.2 kg (309 lb)   SpO2 100%   BMI 60.35 kg/m    Estimated body mass index is 60.35 kg/m  as calculated from the following:    Height as of this encounter: 1.524 m (5').    Weight as of this encounter: 140.2 kg (309 lb).. Body surface area is 2.44 meters squared. BP completed using cuff size: regular  Moderate Pain (5)                Stefani Angulo CMA     Again, thank you for allowing me to participate in the care of your patient.        Sincerely,        Deysi Gallegos NP

## 2019-10-30 DIAGNOSIS — M62.830 SPASM OF BACK MUSCLES: ICD-10-CM

## 2019-10-30 NOTE — TELEPHONE ENCOUNTER
Requested Prescriptions   Pending Prescriptions Disp Refills     methocarbamol (ROBAXIN) 500 MG tablet [Pharmacy Med Name: METHOCARBAMOL 500MG TABS] 120 tablet 2     Sig: TAKE ONE TABLET BY MOUTH FOUR TIMES A DAY AS NEEDED       There is no refill protocol information for this order

## 2019-10-30 NOTE — TELEPHONE ENCOUNTER
methocarbamol (ROBAXIN) 500 MG tablet      Last Written Prescription Date:  7/1/19  Last Fill Quantity: 120,   # refills: 2  Last Office Visit: 10/7/19  Future Office visit:       Routing refill request to provider for review/approval because:  Drug not on the [facility], [facility] or [facility] refill protocol or controlled substance

## 2019-10-31 ENCOUNTER — HOSPITAL ENCOUNTER (OUTPATIENT)
Dept: MRI IMAGING | Facility: CLINIC | Age: 44
Discharge: HOME | End: 2019-10-31
Attending: NURSE PRACTITIONER | Admitting: NURSE PRACTITIONER
Payer: COMMERCIAL

## 2019-10-31 DIAGNOSIS — M54.16 LUMBAR RADICULOPATHY: ICD-10-CM

## 2019-10-31 PROCEDURE — 72148 MRI LUMBAR SPINE W/O DYE: CPT

## 2019-10-31 RX ORDER — METHOCARBAMOL 500 MG/1
TABLET, FILM COATED ORAL
Qty: 120 TABLET | Refills: 2 | Status: SHIPPED | OUTPATIENT
Start: 2019-10-31 | End: 2020-03-09

## 2019-11-06 ENCOUNTER — TELEPHONE (OUTPATIENT)
Dept: NEUROSURGERY | Facility: CLINIC | Age: 44
End: 2019-11-06

## 2019-11-06 ENCOUNTER — MYC MEDICAL ADVICE (OUTPATIENT)
Dept: NEUROSURGERY | Facility: CLINIC | Age: 44
End: 2019-11-06

## 2019-11-06 DIAGNOSIS — M54.50 CHRONIC BILATERAL LOW BACK PAIN WITHOUT SCIATICA: ICD-10-CM

## 2019-11-06 DIAGNOSIS — G89.29 CHRONIC BILATERAL LOW BACK PAIN WITHOUT SCIATICA: ICD-10-CM

## 2019-11-06 DIAGNOSIS — M54.16 LUMBAR RADICULOPATHY: Primary | ICD-10-CM

## 2019-11-06 NOTE — TELEPHONE ENCOUNTER
Contacted patient to discuss lumbar MRI results. Recommend PT and facet injections at this time. She verbalized understanding and agreement.

## 2019-11-06 NOTE — TELEPHONE ENCOUNTER
Contacted patient to review plan. She states she is not interested in formal PT consult at this time as she continues to do PT exercises at home. She is willing to try injection therapy. She will contact us if pain persists.

## 2019-11-14 ENCOUNTER — TELEPHONE (OUTPATIENT)
Dept: SURGERY | Facility: CLINIC | Age: 44
End: 2019-11-14

## 2019-11-14 NOTE — TELEPHONE ENCOUNTER
Contacted patient to schedule injection  Date: 11/29/19  Time: 330pm  Dr. Zamora    Instructed pt to have H&P and  for procedure.

## 2019-11-22 ENCOUNTER — OFFICE VISIT (OUTPATIENT)
Dept: FAMILY MEDICINE | Facility: OTHER | Age: 44
End: 2019-11-22
Payer: COMMERCIAL

## 2019-11-22 VITALS
HEIGHT: 60 IN | DIASTOLIC BLOOD PRESSURE: 80 MMHG | SYSTOLIC BLOOD PRESSURE: 128 MMHG | RESPIRATION RATE: 20 BRPM | TEMPERATURE: 97.5 F | HEART RATE: 76 BPM | BODY MASS INDEX: 57.52 KG/M2 | WEIGHT: 293 LBS

## 2019-11-22 DIAGNOSIS — M51.369 BULGING LUMBAR DISC: ICD-10-CM

## 2019-11-22 DIAGNOSIS — Z01.818 PREOP GENERAL PHYSICAL EXAM: Primary | ICD-10-CM

## 2019-11-22 LAB
ANION GAP SERPL CALCULATED.3IONS-SCNC: 6 MMOL/L (ref 3–14)
BASOPHILS # BLD AUTO: 0 10E9/L (ref 0–0.2)
BASOPHILS NFR BLD AUTO: 0.2 %
BUN SERPL-MCNC: 12 MG/DL (ref 7–30)
CALCIUM SERPL-MCNC: 8.8 MG/DL (ref 8.5–10.1)
CHLORIDE SERPL-SCNC: 109 MMOL/L (ref 94–109)
CO2 SERPL-SCNC: 26 MMOL/L (ref 20–32)
CREAT SERPL-MCNC: 0.79 MG/DL (ref 0.52–1.04)
DIFFERENTIAL METHOD BLD: NORMAL
EOSINOPHIL # BLD AUTO: 0.4 10E9/L (ref 0–0.7)
EOSINOPHIL NFR BLD AUTO: 3.9 %
ERYTHROCYTE [DISTWIDTH] IN BLOOD BY AUTOMATED COUNT: 14.4 % (ref 10–15)
GFR SERPL CREATININE-BSD FRML MDRD: 90 ML/MIN/{1.73_M2}
GLUCOSE SERPL-MCNC: 155 MG/DL (ref 70–99)
HCT VFR BLD AUTO: 42.9 % (ref 35–47)
HGB BLD-MCNC: 14 G/DL (ref 11.7–15.7)
LYMPHOCYTES # BLD AUTO: 1.9 10E9/L (ref 0.8–5.3)
LYMPHOCYTES NFR BLD AUTO: 21.1 %
MCH RBC QN AUTO: 29.5 PG (ref 26.5–33)
MCHC RBC AUTO-ENTMCNC: 32.6 G/DL (ref 31.5–36.5)
MCV RBC AUTO: 90 FL (ref 78–100)
MONOCYTES # BLD AUTO: 0.4 10E9/L (ref 0–1.3)
MONOCYTES NFR BLD AUTO: 4 %
NEUTROPHILS # BLD AUTO: 6.4 10E9/L (ref 1.6–8.3)
NEUTROPHILS NFR BLD AUTO: 70.8 %
PLATELET # BLD AUTO: 398 10E9/L (ref 150–450)
POTASSIUM SERPL-SCNC: 3.8 MMOL/L (ref 3.4–5.3)
RBC # BLD AUTO: 4.75 10E12/L (ref 3.8–5.2)
SODIUM SERPL-SCNC: 141 MMOL/L (ref 133–144)
WBC # BLD AUTO: 9 10E9/L (ref 4–11)

## 2019-11-22 PROCEDURE — 99214 OFFICE O/P EST MOD 30 MIN: CPT | Performed by: PHYSICIAN ASSISTANT

## 2019-11-22 PROCEDURE — 85025 COMPLETE CBC W/AUTO DIFF WBC: CPT | Performed by: PHYSICIAN ASSISTANT

## 2019-11-22 PROCEDURE — 80048 BASIC METABOLIC PNL TOTAL CA: CPT | Performed by: PHYSICIAN ASSISTANT

## 2019-11-22 PROCEDURE — 36415 COLL VENOUS BLD VENIPUNCTURE: CPT | Performed by: PHYSICIAN ASSISTANT

## 2019-11-22 ASSESSMENT — PAIN SCALES - GENERAL: PAINLEVEL: MODERATE PAIN (5)

## 2019-11-22 ASSESSMENT — MIFFLIN-ST. JEOR: SCORE: 1971.29

## 2019-11-22 NOTE — PROGRESS NOTES
[facility]  [address] [facility] [id number] [phone]  Dept: [phone]    PRE-OP EVALUATION:  Today's date: 2019    Brittney Moon (: 1975) presents for pre-operative evaluation assessment as requested by Dr. Zamora.  She requires evaluation and anesthesia risk assessment prior to undergoing surgery/procedure for treatment of back problems .    Fax number for surgical facility: [address]   [address]  Tel. [phone] / Fax [phone]    Primary Physician: No Ref-Primary, Physician  Type of Anesthesia Anticipated: Monitor Anesthesia Care  Patient has a Health Care Directive or Living Will:  NO    Preop Questions 2019   Who is doing your surgery? unknown   What are you having done? spinal injection   Date of Surgery/Procedure: 2019   Facility or Hospital where procedure/surgery will be performed: [facility]   1.  Do you have a history of Heart attack, stroke, stent, coronary bypass surgery, or other heart surgery? No   2.  Do you ever have any pain or discomfort in your chest? No   3.  Do you have a history of  Heart Failure? No   4.   Are you troubled by shortness of breath when:  walking on a level surface, or up a slight hill, or at night? No   5.  Do you currently have a cold, bronchitis or other respiratory infection? No   6.  Do you have a cough, shortness of breath, or wheezing? No   7.  Do you sometimes get pains in the calves of your legs when you walk? No   8. Do you or anyone in your family have previous history of blood clots? YES - mother   9.  Do you or does anyone in your family have a serious bleeding problem such as prolonged bleeding following surgeries or cuts? No   10. Have you ever had problems with anemia or been told to take iron pills? No   11. Have you had any abnormal blood loss such as black, tarry or bloody stools, or abnormal vaginal bleeding? No   12. Have  you ever had a blood transfusion? YES - no complications   13. Have you or any of your relatives ever had problems with anesthesia? No   14. Do you have sleep apnea, excessive snoring or daytime drowsiness? No   15. Do you have any prosthetic heart valves? No   16. Do you have prosthetic joints? YES - right knee   17. Is there any chance that you may be pregnant? No         HPI:     HPI related to upcoming procedure:   Patient is a 43 year old female with pmh of left left amputation, diabetes, obesity and lumbar radiculopathy who presents today for pre-operative evaluation. She is scheduled for lumbar sacral facet injection on 11/29/2019 with Noah. Patient has not other health concerns at this time.       See problem list for active medical problems.  Problems all longstanding and stable, except as noted/documented.  See ROS for pertinent symptoms related to these conditions.      MEDICAL HISTORY:     Patient Active Problem List    Diagnosis Date Noted     Ganglion cyst 11/19/2018     Priority: Medium     Type 2 diabetes mellitus without complication, without long-term current use of insulin (H) 10/21/2018     Priority: Medium     Morbid obesity (H) 10/21/2018     Priority: Medium      Past Medical History:   Diagnosis Date     Other convulsions     seizure disorder     Unspecified osteomyelitis, lower leg      Past Surgical History:   Procedure Laterality Date     C AMPUTATION LOW LEG THRU TIB/FIB      Below knee amputation secondary to osteomyelitis     EXCISE GANGLION WRIST Left 12/24/2018    Procedure: EXCISION LEFT WRIST GANGLION CYST;  Surgeon: Kehinde Pino DO;  Location[facility]     Current Outpatient Medications   Medication Sig Dispense Refill     alcohol swab prep pads Use to swab area of injection/tez as directed 100 each 3     blood glucose (NO BRAND SPECIFIED) test strip Use to test blood sugar 1-2 times daily or as directed. To accompany: Blood Glucose Monitor Brands: per insurance. 100  strip 6     Blood Glucose Calibration (ACCU-CHEK GUIDE CONTROL) LIQD USE AS DIRECTED TO CALIBRATE METER WITH EACH NEW BOX OF TEST STRIPS 1 each 3     blood glucose calibration (NO BRAND SPECIFIED) solution Use to calibrate blood glucose monitor as needed as directed. To accompany: Blood Glucose Monitor Brands: per insurance. 1 Bottle 3     blood glucose monitoring (NO BRAND SPECIFIED) meter device kit Use to test blood sugar 1-2 times daily or as directed. 1 kit 0     Blood Glucose Monitoring Suppl (ACCU-CHEK GUIDE) w/Device KIT USE TO TEST BLOOD SUGARS ONCE DAILY TO TWO TIMES A DAY 1 kit 0     gabapentin (NEURONTIN) 300 MG capsule Take 1 capsule (300 mg) by mouth 3 times daily 90 capsule 3     glyBURIDE (DIABETA /MICRONASE) 5 MG tablet TAKE ONE TABLET BY MOUTH TWICE A DAY WITH MEALS 60 tablet 5     metFORMIN (GLUCOPHAGE-XR) 500 MG 24 hr tablet TAKE TWO TABLETS BY MOUTH TWICE A DAY WITH MEALS 120 tablet 5     methocarbamol (ROBAXIN) 500 MG tablet TAKE ONE TABLET BY MOUTH FOUR TIMES A DAY AS NEEDED 120 tablet 2     STATIN NOT PRESCRIBED, INTENTIONAL, 1 each daily Please choose reason not prescribed, below       thin (NO BRAND SPECIFIED) lancets Use to test blood sugar 1-2 times daily or as directed. To accompany: Blood Glucose Monitor Brands: per insurance. 100 each 1     tiZANidine (ZANAFLEX) 2 MG tablet Take 1 tablet (2 mg) by mouth 3 times daily as needed for muscle spasms 60 tablet 1     OTC products: NSAIDS    Allergies   Allergen Reactions     Food      cilantro      Latex Allergy: NO    Social History     Tobacco Use     Smoking status: Never Smoker     Smokeless tobacco: Never Used   Substance Use Topics     Alcohol use: Yes     Comment: rare     History   Drug Use No       REVIEW OF SYSTEMS:   Constitutional, HEENT, cardiovascular, pulmonary, gi and gu systems are negative, except as otherwise noted.    EXAM:   /80 (BP Location: Right arm, Patient Position: Chair, Cuff Size: Adult Regular)   Pulse 76    Temp 97.5  F (36.4  C) (Oral)   Resp 20   Ht 1.524 m (5')   Wt 140.5 kg (309 lb 11.2 oz)   BMI 60.48 kg/m      GENERAL APPEARANCE: healthy, alert and no distress     EYES: EOMI, PERRL     HENT: ear canals and TM's normal and nose and mouth without ulcers or lesions     NECK: no adenopathy, no asymmetry, masses, or scars and thyroid normal to palpation     RESP: lungs clear to auscultation - no rales, rhonchi or wheezes     CV: regular rates and rhythm, normal S1 S2, no S3 or S4 and no murmur, click or rub     ABDOMEN:  soft, nontender, no HSM or masses and bowel sounds normal     MS: extremities normal, left leg amputation- no gross deformities noted, no evidence of inflammation in joints, FROM in all extremities.     NEURO: Normal strength and tone, sensory exam grossly normal numbness over right distal extremity, mentation intact and speech normal     PSYCH: mentation appears normal. and affect normal/bright    DIAGNOSTICS:     Labs Resulted Today:   Results for orders placed or performed in visit on 11/22/19   CBC with platelets and differential     Status: None   Result Value Ref Range    WBC 9.0 4.0 - 11.0 10e9/L    RBC Count 4.75 3.8 - 5.2 10e12/L    Hemoglobin 14.0 11.7 - 15.7 g/dL    Hematocrit 42.9 35.0 - 47.0 %    MCV 90 78 - 100 fl    MCH 29.5 26.5 - 33.0 pg    MCHC 32.6 31.5 - 36.5 g/dL    RDW 14.4 10.0 - 15.0 %    Platelet Count 398 150 - 450 10e9/L    % Neutrophils 70.8 %    % Lymphocytes 21.1 %    % Monocytes 4.0 %    % Eosinophils 3.9 %    % Basophils 0.2 %    Absolute Neutrophil 6.4 1.6 - 8.3 10e9/L    Absolute Lymphocytes 1.9 0.8 - 5.3 10e9/L    Absolute Monocytes 0.4 0.0 - 1.3 10e9/L    Absolute Eosinophils 0.4 0.0 - 0.7 10e9/L    Absolute Basophils 0.0 0.0 - 0.2 10e9/L    Diff Method Automated Method    Basic metabolic panel     Status: Abnormal   Result Value Ref Range    Sodium 141 133 - 144 mmol/L    Potassium 3.8 3.4 - 5.3 mmol/L    Chloride 109 94 - 109 mmol/L    Carbon Dioxide 26 20 -  32 mmol/L    Anion Gap 6 3 - 14 mmol/L    Glucose 155 (H) 70 - 99 mg/dL    Urea Nitrogen 12 7 - 30 mg/dL    Creatinine 0.79 0.52 - 1.04 mg/dL    GFR Estimate 90 >60 mL/min/[1.73_m2]    GFR Estimate If Black >90 >60 mL/min/[1.73_m2]    Calcium 8.8 8.5 - 10.1 mg/dL       Recent Labs   Lab Test 10/07/19  0957 05/13/19  0922 12/18/18  1028  10/19/18  1622   HGB  --   --  13.8  --  14.0   PLT  --   --  380  --  399   NA  --   --  139  --  138   POTASSIUM  --   --  4.0  --  3.8   CR  --   --  0.85  --  0.71   A1C 6.7* 6.7* 8.6*   < > 10.4*    < > = values in this interval not displayed.        IMPRESSION:   Reason for surgery/procedure: radicular pain and numbness  Diagnosis/reason for consult: Bulging lumbar disc    The proposed surgical procedure is considered INTERMEDIATE risk.    REVISED CARDIAC RISK INDEX  The patient has the following serious cardiovascular risks for perioperative complications such as (MI, PE, VFib and 3  AV Block):  No serious cardiac risks  INTERPRETATION: 0 risks: Class I (very low risk - 0.4% complication rate)    The patient has the following additional risks for perioperative complications:  Morbid obesity      ICD-10-CM    1. Preop general physical exam Z01.818 CBC with platelets and differential     Basic metabolic panel   2. Bulging lumbar disc M51.26        RECOMMENDATIONS:       Diabetes Medication Use  -----Hold usual oral and non-insulin diabetic meds (e.g. Metformin, Actos, Glipizide) while NPO.       APPROVAL GIVEN to proceed with proposed procedure, without further diagnostic evaluation       Signed Electronically by: Pelon Cotter PA-C    Copy of this evaluation report is provided to requesting physician.    [facility] Preop Guidelines    Revised Cardiac Risk Index

## 2019-11-22 NOTE — NURSING NOTE
Health Maintenance Due   Topic Date Due     EYE EXAM  1975     HIV SCREENING  12/29/1988     PNEUMOCOCCAL IMMUNIZATION 19-64 MEDIUM RISK (1 of 1 - PPSV23) 12/29/1992     MEDICARE ANNUAL WELLNESS VISIT  08/08/2002     DIABETIC FOOT EXAM  11/16/2019     BMP  12/18/2019     Domitila GEORGE LPN

## 2019-11-28 ENCOUNTER — ANESTHESIA EVENT (OUTPATIENT)
Dept: SURGERY | Facility: CLINIC | Age: 44
End: 2019-11-28
Payer: COMMERCIAL

## 2019-11-28 ASSESSMENT — LIFESTYLE VARIABLES: TOBACCO_USE: 0

## 2019-11-29 ENCOUNTER — HOSPITAL ENCOUNTER (OUTPATIENT)
Facility: CLINIC | Age: 44
Discharge: HOME | End: 2019-11-29
Attending: ANESTHESIOLOGY | Admitting: ANESTHESIOLOGY
Payer: COMMERCIAL

## 2019-11-29 ENCOUNTER — ANESTHESIA (OUTPATIENT)
Dept: SURGERY | Facility: CLINIC | Age: 44
End: 2019-11-29
Payer: COMMERCIAL

## 2019-11-29 ENCOUNTER — HOSPITAL ENCOUNTER (OUTPATIENT)
Dept: GENERAL RADIOLOGY | Facility: CLINIC | Age: 44
End: 2019-11-29
Attending: ANESTHESIOLOGY | Admitting: ANESTHESIOLOGY
Payer: COMMERCIAL

## 2019-11-29 VITALS
BODY MASS INDEX: 57.52 KG/M2 | TEMPERATURE: 97.5 F | HEIGHT: 60 IN | DIASTOLIC BLOOD PRESSURE: 81 MMHG | SYSTOLIC BLOOD PRESSURE: 132 MMHG | RESPIRATION RATE: 20 BRPM | WEIGHT: 293 LBS | OXYGEN SATURATION: 98 %

## 2019-11-29 DIAGNOSIS — G89.29 CHRONIC LOW BACK PAIN WITHOUT SCIATICA: ICD-10-CM

## 2019-11-29 DIAGNOSIS — M54.50 CHRONIC LOW BACK PAIN WITHOUT SCIATICA: ICD-10-CM

## 2019-11-29 LAB
GLUCOSE BLDC GLUCOMTR-MCNC: 111 MG/DL (ref 70–99)
GLUCOSE BLDC GLUCOMTR-MCNC: 68 MG/DL (ref 70–99)
GLUCOSE BLDC GLUCOMTR-MCNC: 76 MG/DL (ref 70–99)
GLUCOSE BLDC GLUCOMTR-MCNC: 89 MG/DL (ref 70–99)
HCG UR QL: NEGATIVE

## 2019-11-29 PROCEDURE — 40000277 XR SURGERY CARM FLUORO LESS THAN 5 MIN W STILLS: Mod: TC

## 2019-11-29 PROCEDURE — 25000125 ZZHC RX 250: Performed by: NURSE ANESTHETIST, CERTIFIED REGISTERED

## 2019-11-29 PROCEDURE — 25000128 H RX IP 250 OP 636: Performed by: ANESTHESIOLOGY

## 2019-11-29 PROCEDURE — 82962 GLUCOSE BLOOD TEST: CPT | Mod: 91

## 2019-11-29 PROCEDURE — 64494 INJ PARAVERT F JNT L/S 2 LEV: CPT | Mod: 50 | Performed by: ANESTHESIOLOGY

## 2019-11-29 PROCEDURE — 37000008 ZZH ANESTHESIA TECHNICAL FEE, 1ST 30 MIN: Performed by: ANESTHESIOLOGY

## 2019-11-29 PROCEDURE — 64493 INJ PARAVERT F JNT L/S 1 LEV: CPT | Mod: 50 | Performed by: ANESTHESIOLOGY

## 2019-11-29 PROCEDURE — 81025 URINE PREGNANCY TEST: CPT | Performed by: NURSE ANESTHETIST, CERTIFIED REGISTERED

## 2019-11-29 PROCEDURE — 64494 INJ PARAVERT F JNT L/S 2 LEV: CPT | Performed by: ANESTHESIOLOGY

## 2019-11-29 PROCEDURE — 25000128 H RX IP 250 OP 636: Performed by: NURSE ANESTHETIST, CERTIFIED REGISTERED

## 2019-11-29 PROCEDURE — 64493 INJ PARAVERT F JNT L/S 1 LEV: CPT | Performed by: ANESTHESIOLOGY

## 2019-11-29 RX ORDER — PROPOFOL 10 MG/ML
INJECTION, EMULSION INTRAVENOUS PRN
Status: DISCONTINUED | OUTPATIENT
Start: 2019-11-29 | End: 2019-11-29

## 2019-11-29 RX ORDER — IOPAMIDOL 612 MG/ML
INJECTION, SOLUTION INTRATHECAL PRN
Status: DISCONTINUED | OUTPATIENT
Start: 2019-11-29 | End: 2019-11-29 | Stop reason: HOSPADM

## 2019-11-29 RX ORDER — NICOTINE POLACRILEX 4 MG
15-30 LOZENGE BUCCAL
Status: DISCONTINUED | OUTPATIENT
Start: 2019-11-29 | End: 2019-11-29 | Stop reason: HOSPADM

## 2019-11-29 RX ORDER — TRIAMCINOLONE ACETONIDE 40 MG/ML
INJECTION, SUSPENSION INTRA-ARTICULAR; INTRAMUSCULAR PRN
Status: DISCONTINUED | OUTPATIENT
Start: 2019-11-29 | End: 2019-11-29 | Stop reason: HOSPADM

## 2019-11-29 RX ORDER — LIDOCAINE HYDROCHLORIDE 20 MG/ML
INJECTION, SOLUTION INFILTRATION; PERINEURAL PRN
Status: DISCONTINUED | OUTPATIENT
Start: 2019-11-29 | End: 2019-11-29

## 2019-11-29 RX ORDER — DEXTROSE MONOHYDRATE 25 G/50ML
25-50 INJECTION, SOLUTION INTRAVENOUS
Status: DISCONTINUED | OUTPATIENT
Start: 2019-11-29 | End: 2019-11-29 | Stop reason: HOSPADM

## 2019-11-29 RX ORDER — BUPIVACAINE HYDROCHLORIDE 7.5 MG/ML
INJECTION, SOLUTION EPIDURAL; RETROBULBAR PRN
Status: DISCONTINUED | OUTPATIENT
Start: 2019-11-29 | End: 2019-11-29 | Stop reason: HOSPADM

## 2019-11-29 RX ORDER — LIDOCAINE 40 MG/G
CREAM TOPICAL
Status: DISCONTINUED | OUTPATIENT
Start: 2019-11-29 | End: 2019-11-29 | Stop reason: HOSPADM

## 2019-11-29 RX ADMIN — PROPOFOL 30 MG: 10 INJECTION, EMULSION INTRAVENOUS at 16:11

## 2019-11-29 RX ADMIN — LIDOCAINE HYDROCHLORIDE 100 MG: 20 INJECTION, SOLUTION INFILTRATION; PERINEURAL at 16:08

## 2019-11-29 RX ADMIN — PROPOFOL 20 MG: 10 INJECTION, EMULSION INTRAVENOUS at 16:09

## 2019-11-29 RX ADMIN — PROPOFOL 20 MG: 10 INJECTION, EMULSION INTRAVENOUS at 16:13

## 2019-11-29 RX ADMIN — PROPOFOL 20 MG: 10 INJECTION, EMULSION INTRAVENOUS at 16:16

## 2019-11-29 RX ADMIN — PROPOFOL 50 MG: 10 INJECTION, EMULSION INTRAVENOUS at 16:08

## 2019-11-29 RX ADMIN — LIDOCAINE HYDROCHLORIDE 1 ML: 10 INJECTION, SOLUTION EPIDURAL; INFILTRATION; INTRACAUDAL; PERINEURAL at 15:12

## 2019-11-29 RX ADMIN — PROPOFOL 20 MG: 10 INJECTION, EMULSION INTRAVENOUS at 16:10

## 2019-11-29 ASSESSMENT — MIFFLIN-ST. JEOR: SCORE: 1968.11

## 2019-11-29 NOTE — ANESTHESIA PREPROCEDURE EVALUATION
Anesthesia Pre-Procedure Evaluation    Patient: Brittney Moon   MRN: [id number] : 1975          Preoperative Diagnosis: Chronic bilateral low back pain without sciatica [M54.5, G89.29]    Procedure(s):  lumbar sacral facet injection bilateral    Past Medical History:   Diagnosis Date     Other convulsions     seizure disorder     Unspecified osteomyelitis, lower leg      Past Surgical History:   Procedure Laterality Date     C AMPUTATION LOW LEG THRU TIB/FIB      Below knee amputation secondary to osteomyelitis     EXCISE GANGLION WRIST Left 2018    Procedure: EXCISION LEFT WRIST GANGLION CYST;  Surgeon: Kehinde Pino DO;  Location: [facility] OR       Anesthesia Evaluation     . Pt has had prior anesthetic.     No history of anesthetic complications          ROS/MED HX    ENT/Pulmonary:     (+)sleep apnea, uses CPAP , . .   (-) tobacco use   Neurologic:  - neg neurologic ROS     Cardiovascular:     (+) ----. : . . . :. . Previous cardiac testing date:results:date: results:ECG reviewed date:18 results:Sinus Rhythm   -consider old inferior infarct -Old anterior infarct.    - Nonspecific T-abnormality. 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  She does have chronic pain from this area        GI/Hepatic:  - neg GI/hepatic ROS      (-) GERD   Renal/Genitourinary:  - ROS Renal section negative       Endo:     (+) type II DM Not using insulin Normal glucose range: was 146 this AM Obesity, .      Psychiatric:  - neg psychiatric ROS       Infectious Disease:  - neg infectious disease ROS       Malignancy:      - no malignancy   Other:    - neg other ROS                      Physical Exam  Normal systems: cardiovascular, pulmonary and dental    Airway   Mallampati: II  TM distance: >3 FB  Neck ROM: limited    Dental     Cardiovascular   Rhythm and rate:  regular and normal      Pulmonary    breath sounds clear to auscultation            Lab Results   Component Value Date    WBC 9.0 11/22/2019    HGB 14.0 11/22/2019    HCT 42.9 11/22/2019     11/22/2019     11/22/2019    POTASSIUM 3.8 11/22/2019    CHLORIDE 109 11/22/2019    CO2 26 11/22/2019    BUN 12 11/22/2019    CR 0.79 11/22/2019     (H) 11/22/2019    IMELDA 8.8 11/22/2019    TSH 2.61 10/19/2018    HCG Negative 12/07/2008       Preop Vitals  BP Readings from Last 3 Encounters:   11/22/19 128/80   10/24/19 132/82   10/07/19 136/80    Pulse Readings from Last 3 Encounters:   11/22/19 76   10/24/19 88   10/07/19 72      Resp Readings from Last 3 Encounters:   11/22/19 20   10/24/19 17   10/07/19 20    SpO2 Readings from Last 3 Encounters:   10/24/19 100%   12/24/18 92%      Temp Readings from Last 1 Encounters:   11/22/19 97.5  F (36.4  C) (Oral)    Ht Readings from Last 1 Encounters:   11/22/19 1.524 m (5')      Wt Readings from Last 1 Encounters:   11/22/19 140.5 kg (309 lb 11.2 oz)    Estimated body mass index is 60.48 kg/m  as calculated from the following:    Height as of 11/22/19: 1.524 m (5').    Weight as of 11/22/19: 140.5 kg (309 lb 11.2 oz).       Anesthesia Plan      History & Physical Review  History and physical reviewed and following examination; no interval change.    ASA Status:  3 .    NPO Status:  > 8 hours    Plan for MAC with Intravenous and Propofol induction. Maintenance will be TIVA.  Reason for MAC:  Deep or markedly invasive procedure (G8)    Pt has significant sleep apnea risks.  Will monitor her airway closely      Postoperative Care  Postoperative pain management:  IV analgesics and Oral pain medications.      Consents  Anesthetic plan, risks, benefits and alternatives discussed with:  Patient.  Use of blood products discussed: No .   .                 MARIA ANTONIA Callahan CRNA

## 2019-11-29 NOTE — ANESTHESIA CARE TRANSFER NOTE
Patient: Brittney Moon    Procedure(s):  lumbar sacral facet injection bilateral 4-5 sacral 1    Diagnosis: Chronic bilateral low back pain without sciatica [M54.5, G89.29]  Diagnosis Additional Information: No value filed.    Anesthesia Type:   MAC     Note:  Airway :Room Air  Patient transferred to:Phase II  Handoff Report: Identifed the Patient, Identified the Reponsible Provider, Reviewed the pertinent medical history, Discussed the surgical course, Reviewed Intra-OP anesthesia mangement and issues during anesthesia, Set expectations for post-procedure period and Allowed opportunity for questions and acknowledgement of understanding      Vitals: (Last set prior to Anesthesia Care Transfer)    CRNA VITALS  11/29/2019 1556 - 11/29/2019 1656      11/29/2019             Resp Rate (observed):  (!) 4                Electronically Signed By: MARIA ANTONIA Callahan CRNA  November 29, 2019  5:32 PM

## 2019-11-29 NOTE — ANESTHESIA POSTPROCEDURE EVALUATION
Patient: Brittney Moon    Procedure(s):  lumbar sacral facet injection bilateral 4-5 sacral 1    Diagnosis:Chronic bilateral low back pain without sciatica [M54.5, G89.29]  Diagnosis Additional Information: No value filed.    Anesthesia Type:  MAC    Note:  Anesthesia Post Evaluation    Patient location during evaluation: Phase 2 and Bedside  Patient participation: Able to fully participate in evaluation  Level of consciousness: awake and alert  Pain management: satisfactory to patient  Airway patency: patent  Cardiovascular status: stable  Respiratory status: spontaneous ventilation and room air  Hydration status: stable  PONV: none       Comments: Appear to tolerate MAC with IV sedation well without anesthesia related problems / complications noted.  Pain level satisfactory per patient. No N  /  V.  No complaints per patient.  Will follow as needed.        Last vitals:  Vitals:    11/29/19 1449   BP: 132/81   Resp: 20   Temp: 97.5  F (36.4  C)   SpO2: 98%         Electronically Signed By: MARIA ANTONIA Callahan CRNA  November 29, 2019  5:34 PM

## 2019-11-29 NOTE — OP NOTE
PRIMARY PROBLEM: Low back pain secondary to lumbar spondylosis  INTERVAL HISTORY:   Discussed the above note with the patient. Agree with above.     PROCEDURE: Bilateral L4-5 and L5-S1  facet joint injections with steroid using fluoroscopic guidance and contrast control.     PROCEDURE DETAILS: After discussing the risks, benefits, and alternatives to the procedure, the patient expressed understanding and wished to proceed. Written consent was obtained.  The patient was escorted to the fluoroscopy suite and placed in the prone position on the procedure table.  A procedural pause was conducted to verify the correct: patient identity, procedure to be performed, side, site, patient position, and any special requirements. The skin was aseptically prepped and draped in the usual fashion. The skin and subcutaneous tissue were anesthetized with 1% lidocaine.  Using intermittent fluoroscopic guidance, a 22 gauge spinal needle was advanced into each facet joint at an oblique angle.  After negative aspiration, 0.25cc of contrast was injected, showing intra-articular spread of contrast without any evidence of intravascular or intrathecal spread.  A 1.0 ml solution consisting of 20mg of triamcinolone and of 0.75 ml 2% lidocaine was then injected slowly and incrementally into the joint.  Following each injection, the needle was withdrawn slightly and was flushed with 1% lidocaine as it was withdrawn.    The patient was monitored with blood pressure and pulse oximetry machines with the assistance of an RN throughout the procedure.  The patient was alert and responsive to questions throughout the procedure.   The patient tolerated the procedure well and was observed in the post-procedural area.  The patient was dismissed without apparent complications.     DIAGNOSIS:  1. Low back pain secondary to lumbar spondylosis and a symptomatic facet arthropathy   PLAN:  1. Performed lateral L4-5 and L5-S1 facet injections.  If it provides  benefit but the duration of benefit is short, will discuss medial branch blocks and radiofrequency rhizotomy with the patient for longer term relief.  The patient was instructed to fill out a pain diary and follow up per Dr. Zamora's instructions.    Canelo Zamora MD  Diplomate of the American Board of Anesthesiology, Pain Medicine

## 2019-11-29 NOTE — DISCHARGE INSTRUCTIONS
Home Care Instructions                Procedure: Epidural injection or joint injection    Activity:    Rest today    Do not work today    Resume normal activity tomorrow    Pain:    You may experience soreness at the injection site for 1 to 3 days.    You may use an ice pack for 20 minutes every 2 hours for the first 24 hours    You may use a heating pad after the first 24 hours    You may use Tylenol  (acetaminophen) every 4 hours or other pain medicines as directed by your physician    Safety  Sedation medicine, if given may remain active for many hours.    It is important for the next 24 hours that you do not:    Drive a car    Operate machines or power tools    Consume alcohol, including beer    Sign any important papers or legal documents    You may experience numbness radiating into your legs or arms, (depending on the procedure location)  This numbness may last several hours.  Until the numb sensation returns to normal please use caution in walking, climbing stairs, stepping out of your vehicle, etc.    Common side effects of steroids:  Not everyone will experience corticosteroid side effects. If side effects are experienced they will gradually subside in the 7-10 day period following an injection.    Most common side effects include:    Flushed face and/or chest    Feeling of warmth, particularly in face but could be overall feeling of warmth    Increased blood sugar in diabetic patients    Menstrual irregularities may occur.  If taking hormone based birth control an alternate method of birth control is recommended    Sleep disturbances and/or mood swings are possible    Leg cramps    Please contact us if you have:  Severe pain   Fever more than 101.5 degrees Fahrenheit  Signs of infection (redness, swelling or drainage)      If you have questions during normal business hours (8am-5pm Monday-Friday) contact the [facility] at [phone]. If you need help after hours, we recommend that you go to a  hospital emergency room or dial 911.

## 2019-12-09 DIAGNOSIS — G62.9 NEUROPATHY: ICD-10-CM

## 2019-12-09 NOTE — TELEPHONE ENCOUNTER
Gabapentin 300 MG       Last Written Prescription Date:  5/24/19  Last Fill Quantity: 90,   # refills: 3  Last Office Visit: 11/22/19  Future Office visit:       Routing refill request to provider for review/approval because:  Drug not on the [facility], P or [facility] refill protocol or controlled substance

## 2019-12-10 RX ORDER — GABAPENTIN 300 MG/1
CAPSULE ORAL
Qty: 90 CAPSULE | Refills: 3 | Status: SHIPPED | OUTPATIENT
Start: 2019-12-10 | End: 2021-08-23

## 2020-03-09 DIAGNOSIS — M62.830 SPASM OF BACK MUSCLES: ICD-10-CM

## 2020-03-09 RX ORDER — METHOCARBAMOL 500 MG/1
TABLET, FILM COATED ORAL
Qty: 120 TABLET | Refills: 2 | Status: SHIPPED | OUTPATIENT
Start: 2020-03-09 | End: 2023-02-23

## 2020-03-09 NOTE — TELEPHONE ENCOUNTER
Methocarbamol  500 MG      Last Written Prescription Date:  10/31/19  Last Fill Quantity: 120,   # refills: 2  Last Office Visit: 11/22/19  Future Office visit:       Routing refill request to provider for review/approval because:  Drug not on the [facility], [facility] or [facility] refill protocol or controlled substance

## 2020-03-15 ENCOUNTER — HEALTH MAINTENANCE LETTER (OUTPATIENT)
Age: 45
End: 2020-03-15

## 2021-01-09 ENCOUNTER — HEALTH MAINTENANCE LETTER (OUTPATIENT)
Age: 46
End: 2021-01-09

## 2021-03-13 ENCOUNTER — HEALTH MAINTENANCE LETTER (OUTPATIENT)
Age: 46
End: 2021-03-13

## 2021-05-08 ENCOUNTER — HEALTH MAINTENANCE LETTER (OUTPATIENT)
Age: 46
End: 2021-05-08

## 2021-07-07 ENCOUNTER — TRANSFERRED RECORDS (OUTPATIENT)
Dept: HEALTH INFORMATION MANAGEMENT | Facility: CLINIC | Age: 46
End: 2021-07-07
Payer: COMMERCIAL

## 2021-07-07 LAB
ALBUMIN (URINE) MG/SPEC: 80 MG/L
ALBUMIN/CREATININE RATIO: ABNORMAL MG/G
CREATININE (URINE): 200 MG/DL
HBA1C MFR BLD: 5.7 %

## 2021-08-23 ENCOUNTER — OFFICE VISIT (OUTPATIENT)
Dept: FAMILY MEDICINE | Facility: CLINIC | Age: 46
End: 2021-08-23
Payer: COMMERCIAL

## 2021-08-23 VITALS
RESPIRATION RATE: 20 BRPM | TEMPERATURE: 97.5 F | WEIGHT: 236.2 LBS | DIASTOLIC BLOOD PRESSURE: 80 MMHG | SYSTOLIC BLOOD PRESSURE: 134 MMHG | BODY MASS INDEX: 46.13 KG/M2 | HEART RATE: 76 BPM

## 2021-08-23 DIAGNOSIS — E11.9 TYPE 2 DIABETES MELLITUS WITHOUT COMPLICATION, WITHOUT LONG-TERM CURRENT USE OF INSULIN (H): Primary | ICD-10-CM

## 2021-08-23 DIAGNOSIS — G62.9 NEUROPATHY: ICD-10-CM

## 2021-08-23 DIAGNOSIS — M54.50 CHRONIC LOW BACK PAIN, UNSPECIFIED BACK PAIN LATERALITY, UNSPECIFIED WHETHER SCIATICA PRESENT: ICD-10-CM

## 2021-08-23 DIAGNOSIS — Z63.4 BEREAVEMENT: ICD-10-CM

## 2021-08-23 DIAGNOSIS — S88.919A AMPUTATION OF LEG (H): ICD-10-CM

## 2021-08-23 DIAGNOSIS — E66.01 MORBID OBESITY (H): ICD-10-CM

## 2021-08-23 DIAGNOSIS — G89.29 CHRONIC LOW BACK PAIN, UNSPECIFIED BACK PAIN LATERALITY, UNSPECIFIED WHETHER SCIATICA PRESENT: ICD-10-CM
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
## 2021-08-23 PROBLEM — M25.552 LEFT HIP PAIN: Status: ACTIVE | Noted: 2020-01-06
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
## 2021-08-23 PROBLEM — R26.89 INABILITY TO BEAR WEIGHT: Status: ACTIVE | Noted: 2021-02-11

## 2021-08-23 PROBLEM — Z96.651 HISTORY OF RIGHT KNEE JOINT REPLACEMENT: Status: ACTIVE | Noted: 2017-11-17

## 2021-08-23 LAB
ANION GAP SERPL CALCULATED.3IONS-SCNC: 5 MMOL/L (ref 3–14)
BUN SERPL-MCNC: 10 MG/DL (ref 7–30)
CALCIUM SERPL-MCNC: 8.6 MG/DL (ref 8.5–10.1)
CHLORIDE BLD-SCNC: 113 MMOL/L (ref 94–109)
CHOLEST SERPL-MCNC: 151 MG/DL
CO2 SERPL-SCNC: 24 MMOL/L (ref 20–32)
CREAT SERPL-MCNC: 0.96 MG/DL (ref 0.52–1.04)
ERYTHROCYTE [DISTWIDTH] IN BLOOD BY AUTOMATED COUNT: 13.7 % (ref 10–15)
FASTING STATUS PATIENT QL REPORTED: NO
GFR SERPL CREATININE-BSD FRML MDRD: 71 ML/MIN/1.73M2
GLUCOSE BLD-MCNC: 118 MG/DL (ref 70–99)
HCT VFR BLD AUTO: 40.4 % (ref 35–47)
HDLC SERPL-MCNC: 44 MG/DL
HGB BLD-MCNC: 13.2 G/DL (ref 11.7–15.7)
LDLC SERPL CALC-MCNC: 77 MG/DL
MCH RBC QN AUTO: 29.9 PG (ref 26.5–33)
MCHC RBC AUTO-ENTMCNC: 32.7 G/DL (ref 31.5–36.5)
MCV RBC AUTO: 92 FL (ref 78–100)
NONHDLC SERPL-MCNC: 107 MG/DL
PLATELET # BLD AUTO: 320 10E3/UL (ref 150–450)
POTASSIUM BLD-SCNC: 3.2 MMOL/L (ref 3.4–5.3)
RBC # BLD AUTO: 4.41 10E6/UL (ref 3.8–5.2)
SODIUM SERPL-SCNC: 142 MMOL/L (ref 133–144)
TRIGL SERPL-MCNC: 152 MG/DL
WBC # BLD AUTO: 9.3 10E3/UL (ref 4–11)

## 2021-08-23 PROCEDURE — 80048 BASIC METABOLIC PNL TOTAL CA: CPT | Performed by: PHYSICIAN ASSISTANT

## 2021-08-23 PROCEDURE — 80061 LIPID PANEL: CPT | Performed by: PHYSICIAN ASSISTANT

## 2021-08-23 PROCEDURE — 99207 PR FOOT EXAM NO CHARGE: CPT | Performed by: PHYSICIAN ASSISTANT

## 2021-08-23 PROCEDURE — 36415 COLL VENOUS BLD VENIPUNCTURE: CPT | Performed by: PHYSICIAN ASSISTANT

## 2021-08-23 PROCEDURE — 85027 COMPLETE CBC AUTOMATED: CPT | Performed by: PHYSICIAN ASSISTANT

## 2021-08-23 PROCEDURE — 99214 OFFICE O/P EST MOD 30 MIN: CPT | Performed by: PHYSICIAN ASSISTANT

## 2021-08-23 RX ORDER — FLUOXETINE 40 MG/1
80 CAPSULE ORAL EVERY MORNING
COMMUNITY

## 2021-08-23 RX ORDER — HYDROCODONE BITARTRATE AND ACETAMINOPHEN 5; 325 MG/1; MG/1
1 TABLET ORAL 2 TIMES DAILY
Status: ON HOLD | COMMUNITY
End: 2022-12-14

## 2021-08-23 RX ORDER — TOPIRAMATE 50 MG/1
50 TABLET, FILM COATED ORAL 2 TIMES DAILY
COMMUNITY
End: 2024-06-21

## 2021-08-23 RX ORDER — SIMVASTATIN 5 MG
5 TABLET ORAL AT BEDTIME
Qty: 30 TABLET | Refills: 5 | Status: SHIPPED | OUTPATIENT
Start: 2021-08-23 | End: 2022-02-11

## 2021-08-23 RX ORDER — METFORMIN HCL 500 MG
1000 TABLET, EXTENDED RELEASE 24 HR ORAL 2 TIMES DAILY WITH MEALS
Qty: 120 TABLET | Refills: 5 | Status: SHIPPED | OUTPATIENT
Start: 2021-08-23 | End: 2022-06-15

## 2021-08-23 RX ORDER — SUMATRIPTAN 25 MG/1
25 TABLET, FILM COATED ORAL
COMMUNITY
End: 2021-10-19

## 2021-08-23 RX ORDER — TRAZODONE HYDROCHLORIDE 100 MG/1
100 TABLET ORAL AT BEDTIME
COMMUNITY
End: 2024-06-07

## 2021-08-23 RX ORDER — KETOROLAC TROMETHAMINE 10 MG/1
10 TABLET, FILM COATED ORAL
COMMUNITY
End: 2022-07-29

## 2021-08-23 RX ORDER — NORTRIPTYLINE HCL 25 MG
25 CAPSULE ORAL AT BEDTIME
Status: ON HOLD | COMMUNITY
Start: 2021-07-14 | End: 2022-12-11

## 2021-08-23 RX ORDER — TOPIRAMATE 100 MG/1
100 TABLET, FILM COATED ORAL 2 TIMES DAILY
COMMUNITY
End: 2024-06-21

## 2021-08-23 RX ORDER — LANOLIN ALCOHOL/MO/W.PET/CERES
1000 CREAM (GRAM) TOPICAL DAILY
COMMUNITY

## 2021-08-23 SDOH — SOCIAL STABILITY - SOCIAL INSECURITY: DISSAPEARANCE AND DEATH OF FAMILY MEMBER: Z63.4

## 2021-08-23 ASSESSMENT — PATIENT HEALTH QUESTIONNAIRE - PHQ9: SUM OF ALL RESPONSES TO PHQ QUESTIONS 1-9: 24

## 2021-08-23 ASSESSMENT — PAIN SCALES - GENERAL: PAINLEVEL: SEVERE PAIN (6)

## 2021-08-23 NOTE — NURSING NOTE
Health Maintenance Due   Topic Date Due     ANNUAL REVIEW OF HM ORDERS  Never done     ADVANCE CARE PLANNING  Never done     EYE EXAM  Never done     MAMMO SCREENING  Never done     HIV SCREENING  Never done     HEPATITIS C SCREENING  Never done     PREVENTIVE CARE VISIT  08/08/2002     DIABETIC FOOT EXAM  11/16/2019     MICROALBUMIN  02/19/2020     A1C  04/07/2020     LIPID  05/13/2020     BMP  11/22/2020     Domitila GEORGE LPN

## 2021-08-23 NOTE — PROGRESS NOTES
Assessment & Plan     Type 2 diabetes mellitus without complication, without long-term current use of insulin (H)  Patient had been seeing a family practice provider with [facility] due to proximity of her home. She has decided to transfer as the provider moved to [place] and the patient is now employed with [employer]. She has lost quite a bit of weight since she was last seen, ~ 70lbs. She attributes this to healthy eating and trying to be more active. She also has moved out of her uncles and is living independently which is requiring her to be more active. A1c was 5.7% through [employer], opted not to recheck this today. Will update other labs.   - FOOT EXAM  - metFORMIN (GLUCOPHAGE-XR) 500 MG 24 hr tablet  Dispense: 120 tablet; Refill: 5  - simvastatin (ZOCOR) 5 MG tablet  Dispense: 30 tablet; Refill: 5  - Basic metabolic panel  (Ca, Cl, CO2, Creat, Gluc, K, Na, BUN)  - CBC with platelets  - Lipid Profile (Chol, Trig, HDL, LDL calc)  - Basic metabolic panel  (Ca, Cl, CO2, Creat, Gluc, K, Na, BUN)  - CBC with platelets  - Lipid Profile (Chol, Trig, HDL, LDL calc)    Morbid obesity (H)  Patient has made great efforts towards weight loss, down ~70lbs. This is fantastic, she was praised for her efforts and encouraged to continue the healthy diet low in salts/sugars/fatty&greasy foods with regular servings of fruits and vegetables and try to get in 30 minutes of aerobic exercise 5 or more days each week as able.   The 10-year ASCVD risk score ([place] NELDA Jr., et al., 2013) is: 1.8%    Values used to calculate the score:      Age: 45 years      Sex: Female      Is Non- : No      Diabetic: Yes      Tobacco smoker: No      Systolic Blood Pressure: 134 mmHg      Is BP treated: No      HDL Cholesterol: 44 mg/dL      Total Cholesterol: 151 mg/dL  - Lipid Profile (Chol, Trig, HDL, LDL calc)  - Lipid Profile (Chol, Trig, HDL, LDL calc)    Bereavement  Patient has lost several family members over the past 6  months. She shared that her grandfather passed in Nov 2020 from heart attack, her aunt passed in Feb 2021, Aunt's daughter passed in April 2021 from lung cancer and sorority sister passed in April during surgery. In addition, close family member injured themselves while helping her repair her home. She is working with [facility] to receive both counseling and psychiatric services. I did opt to increase her nortriptyline today to help with her depression as well as neuropathy.      Chronic low back pain, unspecified back pain laterality, unspecified whether sciatica present  Amputation of leg (H)  Neuropathy  Patient continues to experience tingling and numbness along the right lower extremity and over the area of amputation. She takes nortriptyline, but this is a low dose. We discussed increasing this and monitoring for adverse effects. She was given information on serotonin syndrome and advised to call here or psychiatrist if symptoms occur.      Depression Screening Follow Up    PHQ 8/23/2021   PHQ-9 Total Score 24   Q9: Thoughts of better off dead/self-harm past 2 weeks Nearly every day     Last PHQ-9 8/23/2021   1.  Little interest or pleasure in doing things 0   2.  Feeling down, depressed, or hopeless 3   3.  Trouble falling or staying asleep, or sleeping too much 3   4.  Feeling tired or having little energy 3   5.  Poor appetite or overeating 3   6.  Feeling bad about yourself 3   7.  Trouble concentrating 3   8.  Moving slowly or restless 3   Q9: Thoughts of better off dead/self-harm past 2 weeks 3   PHQ-9 Total Score 24   Difficulty at work, home, or with people Very difficult         No flowsheet data found.      Follow Up      Follow Up Actions Taken  Crisis resource information provided in the After Visit Summary  Patient is established with counselor and psychiatrist    Discussed the following ways the patient can remain in a safe environment:  be around others    Return in about 6 months  (around 2/23/2022).    ELIZABETH Moreno [facility] KAUSHIK Lugo is a 45 year old who presents for the following health issues     HPI   Diabetes Follow-up      How often are you checking your blood sugar? Not at all    What concerns do you have today about your diabetes? None     Do you have any of these symptoms? (Select all that apply)  Numbness in feet, Excessive thirst and Weight loss    Have you had a diabetic eye exam in the last 12 months? No        BP Readings from Last 2 Encounters:   08/23/21 134/80   11/29/19 132/81     Hemoglobin A1C (%)   Date Value   10/07/2019 6.7 (H)   05/13/2019 6.7 (H)     LDL Cholesterol Calculated (mg/dL)   Date Value   05/13/2019 78   10/19/2018 73             How many servings of fruits and vegetables do you eat daily?  2-3    On average, how many sweetened beverages do you drink each day (Examples: soda, juice, sweet tea, etc.  Do NOT count diet or artificially sweetened beverages)?   0    How many days per week do you exercise enough to make your heart beat faster? 5    How many minutes a day do you exercise enough to make your heart beat faster? 30 - 60    How many days per week do you miss taking your medication? 0        Review of Systems   Constitutional, HEENT, cardiovascular, pulmonary, gi and gu systems are negative, except as otherwise noted.      Objective    /80 (BP Location: Right arm, Patient Position: Chair, Cuff Size: Adult Large)   Pulse 76   Temp 97.5  F (36.4  C) (Temporal)   Resp 20   Wt 107.1 kg (236 lb 3.2 oz)   BMI 46.13 kg/m    Body mass index is 46.13 kg/m .  Physical Exam   GENERAL: healthy, alert and no distress  NECK: no adenopathy, no asymmetry, masses, or scars and thyroid normal to palpation  RESP: lungs clear to auscultation - no rales, rhonchi or wheezes  CV: regular rate and rhythm, normal S1 S2, no S3 or S4, no murmur, click or rub, no peripheral edema and peripheral pulses strong  MS: no  gross musculoskeletal defects noted, no edema  Diabetic foot exam: normal DP and PT pulses, no trophic changes or ulcerative lesions and reduced sensation to light touch and sharp vs dull over the right foot extending proximally to the ankle     Results for orders placed or performed in visit on 08/23/21   Basic metabolic panel  (Ca, Cl, CO2, Creat, Gluc, K, Na, BUN)     Status: Abnormal   Result Value Ref Range    Sodium 142 133 - 144 mmol/L    Potassium 3.2 (L) 3.4 - 5.3 mmol/L    Chloride 113 (H) 94 - 109 mmol/L    Carbon Dioxide (CO2) 24 20 - 32 mmol/L    Anion Gap 5 3 - 14 mmol/L    Urea Nitrogen 10 7 - 30 mg/dL    Creatinine 0.96 0.52 - 1.04 mg/dL    Calcium 8.6 8.5 - 10.1 mg/dL    Glucose 118 (H) 70 - 99 mg/dL    GFR Estimate 71 >60 mL/min/1.73m2   CBC with platelets     Status: Normal   Result Value Ref Range    WBC Count 9.3 4.0 - 11.0 10e3/uL    RBC Count 4.41 3.80 - 5.20 10e6/uL    Hemoglobin 13.2 11.7 - 15.7 g/dL    Hematocrit 40.4 35.0 - 47.0 %    MCV 92 78 - 100 fL    MCH 29.9 26.5 - 33.0 pg    MCHC 32.7 31.5 - 36.5 g/dL    RDW 13.7 10.0 - 15.0 %    Platelet Count 320 150 - 450 10e3/uL   Lipid Profile (Chol, Trig, HDL, LDL calc)     Status: Abnormal   Result Value Ref Range    Cholesterol 151 <200 mg/dL    Triglycerides 152 (H) <150 mg/dL    Direct Measure HDL 44 (L) >=50 mg/dL    LDL Cholesterol Calculated 77 <=100 mg/dL    Non HDL Cholesterol 107 <130 mg/dL    Patient Fasting > 8hrs? No

## 2021-08-23 NOTE — PATIENT INSTRUCTIONS
1. Will start simvastatin 5mg to reduce risk for heart disease    2. Nortriptyline will be increased to 50mg at bedtime. Monitor for serotonin syndrome    3. I will check on when the mandated requirement for Art is     4. Monitor foot to ensure no cuts or scratches

## 2021-08-25 DIAGNOSIS — E11.9 TYPE 2 DIABETES MELLITUS WITHOUT COMPLICATION, WITHOUT LONG-TERM CURRENT USE OF INSULIN (H): Primary | ICD-10-CM

## 2021-08-28 ENCOUNTER — HEALTH MAINTENANCE LETTER (OUTPATIENT)
Age: 46
End: 2021-08-28

## 2021-08-30 ENCOUNTER — TELEPHONE (OUTPATIENT)
Dept: FAMILY MEDICINE | Facility: CLINIC | Age: 46
End: 2021-08-30

## 2021-08-30 NOTE — TELEPHONE ENCOUNTER
"Patient is calling to let Pelon know of her week.  She states she was just reaching out because Pelon had said to let him know if anything changed in her mental health.    She states she is not suicidal.  She does have thoughts of cutting, but has not done anything about these thoughts.  She does still have counseling weekly with [facility], but has not support from family members.    She wanted to let Pelon know that she gets up every day, she \"goes to work\" in her home, but then at the end of the day she has just been sitting on her couch and not able to motivate herself to go do anything.    This is a FYI.  If you would like to message her or let her know anything, feel free.  Radha Chacon, MARION, RN    "

## 2021-08-30 NOTE — TELEPHONE ENCOUNTER
Patient calling and stating she has been experiencing worsening depression and would like Pelon Cotter to return her call today. Niurka Rivera LPN

## 2021-08-30 NOTE — TELEPHONE ENCOUNTER
Can the patient please be triaged for safety concerns. Worsening depression, lost several family members over this past year.     Thanks,  Pelon Cotter PA-C on 8/30/2021 at 5:49 PM

## 2021-10-19 DIAGNOSIS — G43.909 MIGRAINE WITHOUT STATUS MIGRAINOSUS, NOT INTRACTABLE, UNSPECIFIED MIGRAINE TYPE: Primary | ICD-10-CM

## 2021-10-19 NOTE — TELEPHONE ENCOUNTER
Was getting from Dr Shan Dobson, now folllowed by Pelon Cotter.  Pharmacy sent to wrong provider, patient out of medication

## 2021-10-19 NOTE — TELEPHONE ENCOUNTER
Imitrex  Routing refill request to provider for review/approval because:  Drug not active on patient's medication list  Listed Historically,  as previously prescribed by another provider from outside office.   KENTON Esparza

## 2021-10-20 RX ORDER — SUMATRIPTAN 25 MG/1
25 TABLET, FILM COATED ORAL
Qty: 18 TABLET | Refills: 0 | Status: SHIPPED | OUTPATIENT
Start: 2021-10-20 | End: 2022-02-02

## 2021-10-23 ENCOUNTER — HEALTH MAINTENANCE LETTER (OUTPATIENT)
Age: 46
End: 2021-10-23

## 2021-11-17 ENCOUNTER — TELEPHONE (OUTPATIENT)
Dept: FAMILY MEDICINE | Facility: CLINIC | Age: 46
End: 2021-11-17
Payer: COMMERCIAL

## 2021-11-17 NOTE — TELEPHONE ENCOUNTER
Chart reviewed by Ambulatory Quality and Data team    Please abstract the following data from this visit with this patient into the appropriate field in Epic:    Tests that can be patient reported without a hard copy:        Other Tests found in the patient's chart through Chart Review/Care Everywhere:    A1c done by this group [facility] on this date: 7/7/21    Note to [employer]: If this section is blank, no results were found via Chart Review/Care Everywhere.

## 2022-01-06 ENCOUNTER — TELEPHONE (OUTPATIENT)
Dept: FAMILY MEDICINE | Facility: CLINIC | Age: 47
End: 2022-01-06

## 2022-01-06 ENCOUNTER — OFFICE VISIT (OUTPATIENT)
Dept: FAMILY MEDICINE | Facility: CLINIC | Age: 47
End: 2022-01-06
Payer: COMMERCIAL

## 2022-01-06 VITALS
SYSTOLIC BLOOD PRESSURE: 122 MMHG | TEMPERATURE: 97.9 F | OXYGEN SATURATION: 93 % | HEART RATE: 87 BPM | RESPIRATION RATE: 20 BRPM | DIASTOLIC BLOOD PRESSURE: 84 MMHG

## 2022-01-06 DIAGNOSIS — M54.50 CHRONIC LOW BACK PAIN, UNSPECIFIED BACK PAIN LATERALITY, UNSPECIFIED WHETHER SCIATICA PRESENT: ICD-10-CM

## 2022-01-06 DIAGNOSIS — F32.2 DEPRESSION, MAJOR, SINGLE EPISODE, SEVERE (H): ICD-10-CM

## 2022-01-06 DIAGNOSIS — E66.01 MORBID OBESITY (H): ICD-10-CM

## 2022-01-06 DIAGNOSIS — G89.29 CHRONIC LOW BACK PAIN, UNSPECIFIED BACK PAIN LATERALITY, UNSPECIFIED WHETHER SCIATICA PRESENT: ICD-10-CM

## 2022-01-06 DIAGNOSIS — Z01.818 PREOP GENERAL PHYSICAL EXAM: Primary | ICD-10-CM

## 2022-01-06 DIAGNOSIS — E11.9 TYPE 2 DIABETES MELLITUS WITHOUT COMPLICATION, WITHOUT LONG-TERM CURRENT USE OF INSULIN (H): ICD-10-CM

## 2022-01-06 LAB
ANION GAP SERPL CALCULATED.3IONS-SCNC: 6 MMOL/L (ref 3–14)
BUN SERPL-MCNC: 8 MG/DL (ref 7–30)
CALCIUM SERPL-MCNC: 9 MG/DL (ref 8.5–10.1)
CHLORIDE BLD-SCNC: 113 MMOL/L (ref 94–109)
CO2 SERPL-SCNC: 20 MMOL/L (ref 20–32)
CREAT SERPL-MCNC: 0.8 MG/DL (ref 0.52–1.04)
ERYTHROCYTE [DISTWIDTH] IN BLOOD BY AUTOMATED COUNT: 13.2 % (ref 10–15)
GFR SERPL CREATININE-BSD FRML MDRD: 90 ML/MIN/1.73M2
GLUCOSE BLD-MCNC: 83 MG/DL (ref 70–99)
HBA1C MFR BLD: 5.4 % (ref 0–5.6)
HCT VFR BLD AUTO: 45.5 % (ref 35–47)
HGB BLD-MCNC: 15 G/DL (ref 11.7–15.7)
MCH RBC QN AUTO: 30.5 PG (ref 26.5–33)
MCHC RBC AUTO-ENTMCNC: 33 G/DL (ref 31.5–36.5)
MCV RBC AUTO: 93 FL (ref 78–100)
PLATELET # BLD AUTO: 290 10E3/UL (ref 150–450)
POTASSIUM BLD-SCNC: 3.9 MMOL/L (ref 3.4–5.3)
RBC # BLD AUTO: 4.92 10E6/UL (ref 3.8–5.2)
SODIUM SERPL-SCNC: 139 MMOL/L (ref 133–144)
WBC # BLD AUTO: 5.8 10E3/UL (ref 4–11)

## 2022-01-06 PROCEDURE — 99214 OFFICE O/P EST MOD 30 MIN: CPT | Performed by: PHYSICIAN ASSISTANT

## 2022-01-06 PROCEDURE — 85027 COMPLETE CBC AUTOMATED: CPT | Performed by: PHYSICIAN ASSISTANT

## 2022-01-06 PROCEDURE — 36415 COLL VENOUS BLD VENIPUNCTURE: CPT | Performed by: PHYSICIAN ASSISTANT

## 2022-01-06 PROCEDURE — 80048 BASIC METABOLIC PNL TOTAL CA: CPT | Performed by: PHYSICIAN ASSISTANT

## 2022-01-06 PROCEDURE — 83036 HEMOGLOBIN GLYCOSYLATED A1C: CPT | Performed by: PHYSICIAN ASSISTANT

## 2022-01-06 ASSESSMENT — PAIN SCALES - GENERAL: PAINLEVEL: SEVERE PAIN (7)

## 2022-01-06 NOTE — PATIENT INSTRUCTIONS
Preparing for Your Surgery  Getting started  A nurse will call you to review your health history and instructions. They will give you an arrival time based on your scheduled surgery time. Please be ready to share:    Your doctor's clinic name and phone number    Your medical, surgical and anesthesia history    A list of allergies and sensitivities    A list of medicines, including herbal treatments and over-the-counter drugs    Whether the patient has a legal guardian (ask how to send us the papers in advance)  Please tell us if you're pregnant--or if there's any chance you might be pregnant. Some surgeries may injure a fetus (unborn baby), so they require a pregnancy test. Surgeries that are safe for a fetus don't always need a test, and you can choose whether to have one.   If you have a child who's having surgery, please ask for a copy of Preparing for Your Child's Surgery.    Preparing for surgery    Within 30 days of surgery: Have a pre-op exam (sometimes called an H&P, or History and Physical). This can be done at a clinic or pre-operative center.  ? If you're having a , you may not need this exam. Talk to your care team.    At your pre-op exam, talk to your care team about all medicines you take. If you need to stop any medicines before surgery, ask when to start taking them again.  ? We do this for your safety. Many medicines can make you bleed too much during surgery. Some change how well surgery (anesthesia) drugs work.    Call your insurance company to let them know you're having surgery. (If you don't have insurance, call [phone].)    Call your clinic if there's any change in your health. This includes signs of a cold or flu (sore throat, runny nose, cough, rash, fever). It also includes a scrape or scratch near the surgery site.    If you have questions on the day of surgery, call your hospital or surgery center.  COVID testing  You may need to be tested for COVID-19 before having  surgery. If so, we will give you instructions.  Eating and drinking guidelines  For your safety: Unless your surgeon tells you otherwise, follow the guidelines below.    Eat and drink as usual until 8 hours before surgery. After that, no food or milk.    Drink clear liquids until 2 hours before surgery. These are liquids you can see through, like water, Gatorade and Propel Water. You may also have black coffee and tea (no cream or milk).    Nothing by mouth within 2 hours of surgery. This includes gum, candy and breath mints.    If you drink alcohol: Stop drinking it the night before surgery.    If your care team tells you to take medicine on the morning of surgery, it's okay to take it with a sip of water.  Preventing infection    Shower or bathe the night before and morning of your surgery. Follow the instructions your clinic gave you. (If no instructions, use regular soap.)    Don't shave or clip hair near your surgery site. We'll remove the hair if needed.    Don't smoke or vape the morning of surgery. You may chew nicotine gum up to 2 hours before surgery. A nicotine patch is okay.  ? Note: Some surgeries require you to completely quit smoking and nicotine. Check with your surgeon.    Your care team will make every effort to keep you safe from infection. We will:  ? Clean our hands often with soap and water (or an alcohol-based hand rub).  ? Clean the skin at your surgery site with a special soap that kills germs.  ? Give you a special gown to keep you warm. (Cold raises the risk of infection.)  ? Wear special hair covers, masks, gowns and gloves during surgery.  ? Give antibiotic medicine, if prescribed. Not all surgeries need antibiotics.  What to bring on the day of surgery    Photo ID and insurance card    Copy of your health care directive, if you have one    Glasses and hearing aides (bring cases)  ? You can't wear contacts during surgery    Inhaler and eye drops, if you use them (tell us about these when  you arrive)    CPAP machine or breathing device, if you use them    A few personal items, if spending the night    If you have . . .  ? A pacemaker, ICD (cardiac defibrillator) or other implant: Bring the ID card.  ? An implanted stimulator: Bring the remote control.  ? A legal guardian: Bring a copy of the certified (court-stamped) guardianship papers.  Please remove any jewelry, including body piercings. Leave jewelry and other valuables at home.  If you're going home the day of surgery    You must have a responsible adult drive you home. They should stay with you overnight as well.    If you don't have someone to stay with you, and you aren't safe to go home alone, we may keep you overnight. Insurance often won't pay for this.  After surgery  If it's hard to control your pain or you need more pain medicine, please call your surgeon's office.  Questions?   If you have any questions for your care team, list them here: _________________________________________________________________________________________________________________________________________________________________________ ____________________________________ ____________________________________ ____________________________________  For informational purposes only. Not to replace the advice of your health care provider. Copyright   2003, 2019 [facility]. All rights reserved. Clinically reviewed by Adriana Anderson MD. [employer] [id number] - REV 07/21.

## 2022-01-06 NOTE — TELEPHONE ENCOUNTER
Please fax pre-op note to surgical facility (number listed in note). Surgery is tomorrow.     Please include labs from today, January 6, 2022, when they are available.     Thanks,  Pelon Cotter PA-C on 1/6/2022 at 10:29 AM

## 2022-01-06 NOTE — PROGRESS NOTES
[address] [address]  Phone: [phone]  Fax: [phone]  Primary Provider: No Ref-Primary, Physician  Pre-op Performing Provider: MICHELLE BARRON      PREOPERATIVE EVALUATION:  Today's date: 1/6/2022    Brittney Moon is a 46 year old female who presents for a preoperative evaluation.    Surgical Information:  Surgery/Procedure: Back stimulator put in  Surgery Location: [facility] in [place]  Surgeon: Dr. Devine  Surgery Date: 01/07/2022  Time of Surgery: 10:15am  Where patient plans to recover: At home with family  Fax number for surgical facility: [phone] Attn Brionna ARGUETA also needs a hard copy to bring with her tomorrow.     Type of Anesthesia Anticipated: to be determined    Assessment & Plan     The proposed surgical procedure is considered INTERMEDIATE risk.    Preop general physical exam  Chronic low back pain, unspecified back pain laterality, unspecified whether sciatica present  Patient has been working with [facility] in [place] in preparation for the upcoming procedure. She did complete a trial with the stimulator over the lucas holiday with quite a bit of relief. Has received instruction to hold all medications prior to the procedure. She will follow this recommendation while continuing the remainder of the prep including fasting, bathing and health care directive.   - Hemoglobin A1c; Future  - CBC with platelets; Future  - Basic metabolic panel  (Ca, Cl, CO2, Creat, Gluc, K, Na, BUN); Future    Type 2 diabetes mellitus without complication, without long-term current use of insulin (H)  Patient's last A1c was completed in August 2021, she will be due for repeat A1c next month. Will update today. Denies changes to sugars, has been taking medication as directed.   - Hemoglobin A1c; Future    Depression, major, single episode, severe (H)  She continues to work with psychiatrist. Fluoxetine was increased to 80mg daily. Since this  change she has been feeling much more energetic, focused and positive. I will defer treatment to this provider.    Morbid obesity (H)  Patient will continue to work on health habits such as a healthy diet low in salts/sugars/fatty&greasy foods with regular servings of fruits and vegetables and try to get in 30 minutes of aerobic exercise 5 or more days each week as able.     Risks and Recommendations:  The patient has the following additional risks and recommendations for perioperative complications:   - Morbid obesity (BMI >40)  Diabetes:  - Patient is not on insulin therapy: regular NPO guidelines can be followed.     Medication Instructions:  Patient is to HOLD all scheduled medications on the day of surgery    RECOMMENDATION:  APPROVAL GIVEN to proceed with proposed procedure, without further diagnostic evaluation.    Subjective     HPI related to upcoming procedure:   Patient has been working with her pain specialist to coordinate surgery. She has been communicating with her employer as to restrictions and return to work.     Preop Questions 1/6/2022   1. Have you ever had a heart attack or stroke? No   2. Have you ever had surgery on your heart or blood vessels, such as a stent placement, a coronary artery bypass, or surgery on an artery in your head, neck, heart, or legs? No   3. Do you have chest pain with activity? No   4. Do you have a history of  heart failure? No   5. Do you currently have a cold, bronchitis or symptoms of other infection? No   6. Do you have a cough, shortness of breath, or wheezing? No   7. Do you or anyone in your family have previous history of blood clots? No   8. Do you or does anyone in your family have a serious bleeding problem such as prolonged bleeding following surgeries or cuts? No   9. Have you ever had problems with anemia or been told to take iron pills? No   10. Have you had any abnormal blood loss such as black, tarry or bloody stools, or abnormal vaginal bleeding? No    11. Have you ever had a blood transfusion? YES - with past surgeries    11a. Have you ever had a transfusion reaction? No   12. Are you willing to have a blood transfusion if it is medically needed before, during, or after your surgery? Yes   13. Have you or any of your relatives ever had problems with anesthesia? No   14. Do you have sleep apnea, excessive snoring or daytime drowsiness? No   15. Do you have any artifical heart valves or other implanted medical devices like a pacemaker, defibrillator, or continuous glucose monitor? No   16. Do you have artificial joints? YES    17. Are you allergic to latex? No   18. Is there any chance that you may be pregnant? No       Health Care Directive:  Patient does not have a Health Care Directive or Living Will: Discussed advance care planning with patient; information given to patient to review.    Preoperative Review of :   reviewed - controlled substances reflected in medication list.      Status of Chronic Conditions:  DEPRESSION - Patient has a long history of Depression of moderate severity requiring medication for control with recent symptoms being stable..Current symptoms of depression include none.     DIABETES - Patient has a longstanding history of DiabetesType Type II . Patient is being treated with oral agents and denies significant side effects. Control has been good. Complicating factors include but are not limited to: morbid obesity .       Review of Systems  Constitutional, neuro, ENT, endocrine, pulmonary, cardiac, gastrointestinal, genitourinary, musculoskeletal, integument and psychiatric systems are negative, except as otherwise noted.    Patient Active Problem List    Diagnosis Date Noted     Chronic low back pain 08/23/2021     Priority: Medium     Inability to bear weight 02/11/2021     Priority: Medium     Fall at home, initial encounter 01/06/2020     Priority: Medium     Left hip pain 01/06/2020     Priority: Medium     Ganglion cyst  11/19/2018     Priority: Medium     Type 2 diabetes mellitus without complication, without long-term current use of insulin (H) 10/21/2018     Priority: Medium     Morbid obesity (H) 10/21/2018     Priority: Medium     Amputation of leg (H) 11/17/2017     Priority: Medium     Formatting of this note might be different from the original.  Overview:   15 surgeries; following club foot repair and osteomyelitis as childe       History of right knee joint replacement 11/17/2017     Priority: Medium     Anxiety disorder due to medical condition 08/03/2010     Priority: Medium      Past Medical History:   Diagnosis Date     Other convulsions     seizure disorder     Unspecified osteomyelitis, lower leg      Past Surgical History:   Procedure Laterality Date     EXCISE GANGLION WRIST Left 12/24/2018    Procedure: EXCISION LEFT WRIST GANGLION CYST;  Surgeon: Kehinde Pino DO;  Location: [facility]     INJECT FACET JOINT Bilateral 11/29/2019    Procedure: lumbar sacral facet injection bilateral 4-5 sacral 1;  Surgeon: Canelo Zamora MD;  Location: [facility] OR     ZZC AMPUTATION LOW LEG THRU TIB/FIB      Below knee amputation secondary to osteomyelitis     Current Outpatient Medications   Medication Sig Dispense Refill     cyanocobalamin (VITAMIN B-12) 1000 MCG tablet Take by mouth daily       FLUoxetine (PROZAC) 40 MG capsule Take 40 mg by mouth daily Taking 2 40 mg tablets       HYDROcodone-acetaminophen (NORCO) 5-325 MG tablet Take 1 tablet by mouth 1 tablet twice daily       ketorolac (TORADOL) 10 MG tablet Take 10 mg by mouth 1 tablet daily as needed       metFORMIN (GLUCOPHAGE-XR) 500 MG 24 hr tablet Take 2 tablets (1,000 mg) by mouth 2 times daily (with meals) 120 tablet 5     methocarbamol (ROBAXIN) 500 MG tablet TAKE ONE TABLET BY MOUTH FOUR TIMES A DAY AS NEEDED (Patient taking differently: TAKE ONE TABLET BY MOUTH twoTIMES A DAY AS NEEDED) 120 tablet 2     nortriptyline (PAMELOR) 25 MG capsule Take 25 mg by mouth  At Bedtime       simvastatin (ZOCOR) 5 MG tablet Take 1 tablet (5 mg) by mouth At Bedtime 30 tablet 5     SUMAtriptan (IMITREX) 25 MG tablet Take 1 tablet (25 mg) by mouth at onset of headache for migraine Take 25 mg by mouth at onset of headache for migraine 18 tablet 0     topiramate (TOPAMAX) 100 MG tablet Take 100 mg by mouth 2 times daily       topiramate (TOPAMAX) 50 MG tablet Take 50 mg by mouth 2 times daily       traZODone (DESYREL) 100 MG tablet Take 100 mg by mouth At Bedtime       VITAMIN D PO 1 tablet daily         Allergies   Allergen Reactions     Food      cilantro     Coriander Oil Rash        Social History     Tobacco Use     Smoking status: Never Smoker     Smokeless tobacco: Never Used   Substance Use Topics     Alcohol use: Yes     Comment: rare       History   Drug Use No         Objective     /84   Pulse 87   Temp 97.9  F (36.6  C) (Temporal)   Resp 20   LMP  (LMP Unknown)   SpO2 93%   Breastfeeding No     Physical Exam    GENERAL APPEARANCE: healthy, alert and no distress     EYES: EOMI, PERRL     HENT: ear canals and TM's normal and nose and mouth without ulcers or lesions     NECK: no adenopathy, no asymmetry, masses, or scars and thyroid normal to palpation     RESP: lungs clear to auscultation - no rales, rhonchi or wheezes     CV: regular rates and rhythm, normal S1 S2, no S3 or S4 and no murmur, click or rub     ABDOMEN:  soft, nontender, no HSM or masses and bowel sounds normal     MS: extremities normal- left leg amputation, no evidence of inflammation in joints, FROM in all extremities.     NEURO: Normal strength and tone, sensory exam grossly normal, mentation intact and speech normal     PSYCH: mentation appears normal. and affect normal/bright     LYMPHATICS: No cervical adenopathy    Recent Labs   Lab Test 08/23/21  1747   HGB 13.2         POTASSIUM 3.2*   CR 0.96        Diagnostics:  No results found for this or any previous visit (from the past 24  hour(s)).       Revised Cardiac Risk Index (RCRI):  The patient has the following serious cardiovascular risks for perioperative complications:   - No serious cardiac risks = 0 points     RCRI Interpretation: 0 points: Class I (very low risk - 0.4% complication rate)           Signed Electronically by: Pelon Cotter PA-C  Copy of this evaluation report is provided to requesting physician.

## 2022-02-01 DIAGNOSIS — G43.909 MIGRAINE WITHOUT STATUS MIGRAINOSUS, NOT INTRACTABLE, UNSPECIFIED MIGRAINE TYPE: ICD-10-CM

## 2022-02-02 RX ORDER — SUMATRIPTAN 25 MG/1
TABLET, FILM COATED ORAL
Qty: 18 TABLET | Refills: 0 | Status: SHIPPED | OUTPATIENT
Start: 2022-02-02 | End: 2022-07-29

## 2022-02-02 NOTE — TELEPHONE ENCOUNTER
Pending Prescriptions:                       Disp   Refills    SUMAtriptan (IMITREX) 25 MG tablet [Pharma*18 tab*0        Sig: TAKE 1 TABLET (25 MG) BY MOUTH AT ONSET OF HEADACHE           FOR MIGRAINE.    Routing refill request to provider for review/approval because:  Serotonin Agonists Failed 02/01/2022 07:30 PM   Protocol Details  Serotonin Agonist request needs review

## 2022-02-09 DIAGNOSIS — E11.9 TYPE 2 DIABETES MELLITUS WITHOUT COMPLICATION, WITHOUT LONG-TERM CURRENT USE OF INSULIN (H): ICD-10-CM

## 2022-02-11 RX ORDER — SIMVASTATIN 5 MG
5 TABLET ORAL AT BEDTIME
Qty: 30 TABLET | Refills: 5 | Status: SHIPPED | OUTPATIENT
Start: 2022-02-11 | End: 2022-07-31

## 2022-02-15 ENCOUNTER — TELEPHONE (OUTPATIENT)
Dept: FAMILY MEDICINE | Facility: CLINIC | Age: 47
End: 2022-02-15
Payer: COMMERCIAL

## 2022-04-09 ENCOUNTER — HEALTH MAINTENANCE LETTER (OUTPATIENT)
Age: 47
End: 2022-04-09

## 2022-05-09 ENCOUNTER — MYC MEDICAL ADVICE (OUTPATIENT)
Dept: FAMILY MEDICINE | Facility: CLINIC | Age: 47
End: 2022-05-09
Payer: COMMERCIAL

## 2022-05-09 DIAGNOSIS — E11.9 TYPE 2 DIABETES MELLITUS WITHOUT COMPLICATION, WITHOUT LONG-TERM CURRENT USE OF INSULIN (H): ICD-10-CM

## 2022-05-09 DIAGNOSIS — E66.01 MORBID OBESITY (H): ICD-10-CM

## 2022-05-09 DIAGNOSIS — S88.919A AMPUTATION OF LEG (H): Primary | ICD-10-CM

## 2022-05-18 NOTE — TELEPHONE ENCOUNTER
See MyChart.  They are having trouble faxing the form to SANDIE Cotter.  They are looking for a letter now.  Radha Chacon, MARION, RN

## 2022-05-19 NOTE — TELEPHONE ENCOUNTER
"New DME order has been placed - See Misc order in \"other orders\". Let me know if this is not sufficient.     Thanks,  Pelon Cotter PA-C on 5/19/2022 at 11:34 AM    "

## 2022-05-24 ENCOUNTER — TELEPHONE (OUTPATIENT)
Dept: FAMILY MEDICINE | Facility: CLINIC | Age: 47
End: 2022-05-24
Payer: COMMERCIAL

## 2022-05-24 NOTE — TELEPHONE ENCOUNTER
Reason for Call:  Other call back    Detailed comments: [facility], Prescription for wheelchair repairs. Power wheelchair that needs repairs for.Please have the prescription say She needs repairs for her power wheelchair that she uses daily. It cannot be a letter it needs to be a prescription and not a letter.  [phone] Fax put it attention [facility].   [facility]: [phone] confidential line    Phone Number Patient can be reached at: Other phone number:  [phone]    Best Time: any    Can we leave a detailed message on this number? YES    Call taken on 5/24/2022 at 1:02 PM by Xneia Kruger

## 2022-06-02 ENCOUNTER — TELEPHONE (OUTPATIENT)
Dept: FAMILY MEDICINE | Facility: CLINIC | Age: 47
End: 2022-06-02
Payer: COMMERCIAL

## 2022-06-02 NOTE — TELEPHONE ENCOUNTER
"I see that this was seen by two people prior to being sent to me. In the future if anyone is wondering where to go to get this information requested below:    Go to chart review --> Encounters --> Look at mychart or telephone visits --> Read through the messages and then relay the following (this is what I did)      Looks like a DME order was faxed on 05/09. This order is in \"other orders\" from that date. I would advise the patient to call the [employer] rep which is listed below. If the DME was not received then please re-fax it.    Pelon Cotter PA-C on 6/2/2022 at 1:08 PM     "

## 2022-06-02 NOTE — TELEPHONE ENCOUNTER
DMe has been faxed to Priti @ [facility].     April Ventura MA

## 2022-06-02 NOTE — TELEPHONE ENCOUNTER
Reason for Call: Request for an order or referral: [facility]    Order or referral being requested: DME for repairs to power wheelchair    Date needed: as soon as possible    Has the patient been seen by the PCP for this problem? YES    Additional comments: [facility] calling on status of prescription being needed for repairs to patients wheelchair. Informed of DME order being placed back on May 19th and is noted it was faxed on the 20th. They did not receive, so the DME has been reprinted and faxed to [facility], ATTN: [facility]i, fax # [phone]    Phone number Patient can be reached at:      Best Time:      Can we leave a detailed message on this number?      Call taken on 6/2/2022 at 10:28 AM by Niurka Rivera LPN

## 2022-07-10 DIAGNOSIS — E11.9 TYPE 2 DIABETES MELLITUS WITHOUT COMPLICATION, WITHOUT LONG-TERM CURRENT USE OF INSULIN (H): ICD-10-CM

## 2022-07-12 NOTE — TELEPHONE ENCOUNTER
Pending Prescriptions:                       Disp   Refills    metFORMIN (GLUCOPHAGE XR) 500 MG 24 hr tab*120 ta*0        Sig: TAKE 2 TABLETS (1,000 MG) BY MOUTH 2 TIMES DAILY WITH           MEALS. - NEED OFFICE VISIT FOR FUTURE REFILLS      Routing refill request to provider for review/approval because:  Anuradha given x1 and patient did not follow up, please advise    Mel Hdez RN

## 2022-07-13 RX ORDER — METFORMIN HCL 500 MG
TABLET, EXTENDED RELEASE 24 HR ORAL
Qty: 120 TABLET | Refills: 0 | OUTPATIENT
Start: 2022-07-13

## 2022-07-13 NOTE — TELEPHONE ENCOUNTER
Patient is overdue for office visit. Anuradha period has been provided and patient did not schedule. Please help patient schedule appointment and I can fill medication to get them to that date.     Pelon Cotter PA-C on 7/13/2022 at 5:59 PM

## 2022-07-29 ENCOUNTER — OFFICE VISIT (OUTPATIENT)
Dept: FAMILY MEDICINE | Facility: OTHER | Age: 47
End: 2022-07-29
Payer: COMMERCIAL

## 2022-07-29 VITALS
SYSTOLIC BLOOD PRESSURE: 126 MMHG | OXYGEN SATURATION: 97 % | DIASTOLIC BLOOD PRESSURE: 78 MMHG | WEIGHT: 214 LBS | HEART RATE: 75 BPM | TEMPERATURE: 97.8 F | BODY MASS INDEX: 43.14 KG/M2 | RESPIRATION RATE: 18 BRPM | HEIGHT: 59 IN

## 2022-07-29 DIAGNOSIS — E78.5 HYPERLIPIDEMIA LDL GOAL <130: ICD-10-CM

## 2022-07-29 DIAGNOSIS — F07.81 POST CONCUSSION SYNDROME: ICD-10-CM

## 2022-07-29 DIAGNOSIS — Z12.31 VISIT FOR SCREENING MAMMOGRAM: ICD-10-CM

## 2022-07-29 DIAGNOSIS — R30.0 DYSURIA: ICD-10-CM

## 2022-07-29 DIAGNOSIS — S88.919A AMPUTATION OF LEG (H): ICD-10-CM

## 2022-07-29 DIAGNOSIS — E11.9 TYPE 2 DIABETES MELLITUS WITHOUT COMPLICATION, WITHOUT LONG-TERM CURRENT USE OF INSULIN (H): Primary | ICD-10-CM

## 2022-07-29 DIAGNOSIS — G43.909 MIGRAINE WITHOUT STATUS MIGRAINOSUS, NOT INTRACTABLE, UNSPECIFIED MIGRAINE TYPE: ICD-10-CM

## 2022-07-29 DIAGNOSIS — E66.01 MORBID OBESITY (H): ICD-10-CM

## 2022-07-29 DIAGNOSIS — Z12.11 SCREEN FOR COLON CANCER: ICD-10-CM

## 2022-07-29 PROBLEM — G90.522 COMPLEX REGIONAL PAIN SYNDROME I OF LEFT LOWER LIMB: Status: ACTIVE | Noted: 2022-07-29

## 2022-07-29 PROBLEM — F11.20 OPIOID DEPENDENCE (H): Status: ACTIVE | Noted: 2022-07-29

## 2022-07-29 LAB
ALBUMIN UR-MCNC: NEGATIVE MG/DL
APPEARANCE UR: CLEAR
BILIRUB UR QL STRIP: NEGATIVE
CHOLEST SERPL-MCNC: 127 MG/DL
COLOR UR AUTO: YELLOW
CREAT UR-MCNC: 86 MG/DL
FASTING STATUS PATIENT QL REPORTED: NO
GLUCOSE UR STRIP-MCNC: NEGATIVE MG/DL
HBA1C MFR BLD: 5.2 % (ref 0–5.6)
HDLC SERPL-MCNC: 50 MG/DL
HGB UR QL STRIP: NEGATIVE
KETONES UR STRIP-MCNC: NEGATIVE MG/DL
LDLC SERPL CALC-MCNC: 56 MG/DL
LEUKOCYTE ESTERASE UR QL STRIP: NEGATIVE
MICROALBUMIN UR-MCNC: 10 MG/L
MICROALBUMIN/CREAT UR: 11.63 MG/G CR (ref 0–25)
NITRATE UR QL: NEGATIVE
NONHDLC SERPL-MCNC: 77 MG/DL
PH UR STRIP: 5.5 [PH] (ref 5–7)
SP GR UR STRIP: 1.02 (ref 1–1.03)
TRIGL SERPL-MCNC: 107 MG/DL
UROBILINOGEN UR STRIP-ACNC: 0.2 E.U./DL

## 2022-07-29 PROCEDURE — 99214 OFFICE O/P EST MOD 30 MIN: CPT | Mod: 25 | Performed by: PHYSICIAN ASSISTANT

## 2022-07-29 PROCEDURE — 83036 HEMOGLOBIN GLYCOSYLATED A1C: CPT | Performed by: PHYSICIAN ASSISTANT

## 2022-07-29 PROCEDURE — 90677 PCV20 VACCINE IM: CPT | Performed by: PHYSICIAN ASSISTANT

## 2022-07-29 PROCEDURE — 90471 IMMUNIZATION ADMIN: CPT | Performed by: PHYSICIAN ASSISTANT

## 2022-07-29 PROCEDURE — 82043 UR ALBUMIN QUANTITATIVE: CPT | Performed by: PHYSICIAN ASSISTANT

## 2022-07-29 PROCEDURE — 36415 COLL VENOUS BLD VENIPUNCTURE: CPT | Performed by: PHYSICIAN ASSISTANT

## 2022-07-29 PROCEDURE — 80061 LIPID PANEL: CPT | Performed by: PHYSICIAN ASSISTANT

## 2022-07-29 PROCEDURE — 81003 URINALYSIS AUTO W/O SCOPE: CPT | Performed by: PHYSICIAN ASSISTANT

## 2022-07-29 RX ORDER — HYDROXYZINE HYDROCHLORIDE 25 MG/1
TABLET, FILM COATED ORAL
COMMUNITY
Start: 2021-05-19 | End: 2022-07-29

## 2022-07-29 RX ORDER — SUMATRIPTAN 25 MG/1
25 TABLET, FILM COATED ORAL
Qty: 18 TABLET | Refills: 0 | Status: SHIPPED | OUTPATIENT
Start: 2022-07-29 | End: 2022-09-27

## 2022-07-29 RX ORDER — FLUOXETINE 40 MG/1
CAPSULE ORAL
COMMUNITY
End: 2022-07-29

## 2022-07-29 RX ORDER — METHOCARBAMOL 500 MG/1
1 TABLET, FILM COATED ORAL
COMMUNITY
Start: 2021-05-12 | End: 2022-07-29

## 2022-07-29 RX ORDER — POTASSIUM CHLORIDE 1500 MG/1
TABLET, EXTENDED RELEASE ORAL
Status: ON HOLD | COMMUNITY
Start: 2022-04-06 | End: 2022-12-11

## 2022-07-29 RX ORDER — METFORMIN HCL 500 MG
TABLET, EXTENDED RELEASE 24 HR ORAL
COMMUNITY
Start: 2022-05-04 | End: 2022-07-29

## 2022-07-29 RX ORDER — KETOROLAC TROMETHAMINE 10 MG/1
10 TABLET, FILM COATED ORAL EVERY 6 HOURS PRN
COMMUNITY
End: 2024-03-07

## 2022-07-29 RX ORDER — ATORVASTATIN CALCIUM 10 MG/1
10 TABLET, FILM COATED ORAL DAILY
COMMUNITY
Start: 2021-07-07 | End: 2022-07-29 | Stop reason: ALTCHOICE

## 2022-07-29 ASSESSMENT — PATIENT HEALTH QUESTIONNAIRE - PHQ9
SUM OF ALL RESPONSES TO PHQ QUESTIONS 1-9: 1
SUM OF ALL RESPONSES TO PHQ QUESTIONS 1-9: 1
10. IF YOU CHECKED OFF ANY PROBLEMS, HOW DIFFICULT HAVE THESE PROBLEMS MADE IT FOR YOU TO DO YOUR WORK, TAKE CARE OF THINGS AT HOME, OR GET ALONG WITH OTHER PEOPLE: SOMEWHAT DIFFICULT

## 2022-07-29 ASSESSMENT — ANXIETY QUESTIONNAIRES
2. NOT BEING ABLE TO STOP OR CONTROL WORRYING: NOT AT ALL
7. FEELING AFRAID AS IF SOMETHING AWFUL MIGHT HAPPEN: NOT AT ALL
4. TROUBLE RELAXING: NOT AT ALL
IF YOU CHECKED OFF ANY PROBLEMS ON THIS QUESTIONNAIRE, HOW DIFFICULT HAVE THESE PROBLEMS MADE IT FOR YOU TO DO YOUR WORK, TAKE CARE OF THINGS AT HOME, OR GET ALONG WITH OTHER PEOPLE: SOMEWHAT DIFFICULT
GAD7 TOTAL SCORE: 2
8. IF YOU CHECKED OFF ANY PROBLEMS, HOW DIFFICULT HAVE THESE MADE IT FOR YOU TO DO YOUR WORK, TAKE CARE OF THINGS AT HOME, OR GET ALONG WITH OTHER PEOPLE?: SOMEWHAT DIFFICULT
3. WORRYING TOO MUCH ABOUT DIFFERENT THINGS: SEVERAL DAYS
GAD7 TOTAL SCORE: 2
GAD7 TOTAL SCORE: 2
6. BECOMING EASILY ANNOYED OR IRRITABLE: NOT AT ALL
1. FEELING NERVOUS, ANXIOUS, OR ON EDGE: SEVERAL DAYS
7. FEELING AFRAID AS IF SOMETHING AWFUL MIGHT HAPPEN: NOT AT ALL
5. BEING SO RESTLESS THAT IT IS HARD TO SIT STILL: NOT AT ALL

## 2022-07-29 ASSESSMENT — PAIN SCALES - GENERAL: PAINLEVEL: SEVERE PAIN (7)
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
## 2022-07-31 DIAGNOSIS — E11.9 TYPE 2 DIABETES MELLITUS WITHOUT COMPLICATION, WITHOUT LONG-TERM CURRENT USE OF INSULIN (H): ICD-10-CM

## 2022-07-31 RX ORDER — METFORMIN HYDROCHLORIDE 750 MG/1
750 TABLET, EXTENDED RELEASE ORAL 2 TIMES DAILY WITH MEALS
Qty: 180 TABLET | Refills: 1 | Status: ON HOLD | OUTPATIENT
Start: 2022-07-31 | End: 2022-12-11

## 2022-07-31 RX ORDER — SIMVASTATIN 5 MG
5 TABLET ORAL AT BEDTIME
Qty: 90 TABLET | Refills: 1 | Status: SHIPPED | OUTPATIENT
Start: 2022-07-31 | End: 2023-02-21

## 2022-08-03 ENCOUNTER — TELEPHONE (OUTPATIENT)
Dept: FAMILY MEDICINE | Facility: OTHER | Age: 47
End: 2022-08-03

## 2022-08-03 NOTE — TELEPHONE ENCOUNTER
Reason for Call:  Form, our goal is to have forms completed with 72 hours, however, some forms may require a visit or additional information.    Type of letter, form or note:  FMLA    Who is the form from?: [employer] (if other please explain)    Where did the form come from: form was faxed in    What clinic location was the form placed at?: [facility] [phone]    Where the form was placed: Team C Box/Folder    What number is listed as a contact on the form?: none       Additional comments: Please complete form and return Attn: Gavi [phone]    Call taken on 8/3/2022 at 4:26 PM by Kiley Dawn

## 2022-08-04 ENCOUNTER — TELEPHONE (OUTPATIENT)
Dept: GASTROENTEROLOGY | Facility: CLINIC | Age: 47
End: 2022-08-04

## 2022-08-04 NOTE — TELEPHONE ENCOUNTER
Screening Questions    BlueKIND OF PREP RedLOCATION [review exclusion criteria] GreenSEDATION TYPE      1. Are you active on mychart? Yes    2. What insurance is in the chart? P1     3.   Ordering/Referring Provider: Cyndee    4. BMI   (If greater than 40 review exclusion criteria [PAC APPT IF [MAC] @ [facility])  43.22  [If yes, BMI OVER 40-EXTENDED PREP]      **(Sedation review/consideration needed)**  Do you have a legal guardian or Medical Power of    and/or are you able to give consent for your medical care?     Yes    5. Have you had a positive covid test in the last 90 days?   N - NA    6.  Are you currently on dialysis?   N [ If yes, G-PREP & HOSPITAL setting ONLY]     7.  Do you have chronic kidney disease?  N [ If yes, G-PREP ]    8.   Do you have a diagnosis of diabetes?   Yes   [ If yes, G-PREP ]    9.  On a regular basis do you go 3-5 days between bowel movements?   N   [ If yes, EXTENDED PREP]    10.  Are you taking any prescription pain medications on a routine schedule?    Yes -Norco  [ If yes, EXTENDED PREP] [If yes, MAC]      11.   Do you have any chemical dependencies such as alcohol, street drugs, or methadone?    N [If yes, MAC]    12.   Do you have any history of post-traumatic stress syndrome, severe anxiety or history of psychosis?    Yes  [If yes, MAC]    13.  [FEMALES] Are you currently pregnant? NA    If yes, how many weeks?       Respiratory/Heart Screening:  [If yes to any of the following HOSPITAL setting only]     14. Do you have Pulmonary Hypertension [Lungs]?   N     15. Do you have UNCONTROLLED asthma?   N    16.  Do you use daily home oxygen?  N     17. Do you have mod to severe Obstructive Sleep Apnea?         (OKAY @ [facility]  [facility]  [facility]  [facility]  [facility]  MG - if pt is not on OXYGEN)  N      18.   Have you had a heart or lung transplant?   N      19.   Have you had a stroke or Transient ischemic attack (TIA - aka  mini stroke ) within 6 months?  (If yes, please review exclusion  criteria)  N     20.   In the past 6 months, have you had any heart related issues including cardiomyopathy or heart attack?   N           If yes, did it require cardiac stenting or other implantable device?   N      21.   Do you have any implantable devices in your body (pacemaker, defib, LVAD)? (If yes, please review exclusion criteria)  Yes-neuro transmitter lower right side  22.  Do you take the medication Phentermine?  NO        23. Do you take nitroglycerin?   N           If yes, how often? NA  (if yes, HOSPITAL setting ONLY)    24.  Are you currently taking any blood thinners?    [If yes, INFORM patient to follw up w/ ORDERING PROVIDER FOR BRIDGING INSTRUCTIONS]     N    25.   Do you transfer independently?                (If NO, please HOSPITAL setting ONLY)  Yes    26.   Preferred LOCAL Pharmacy for Pre Prescription:         [address]     Scheduling Details  (Please ask for phone number if not scheduled by patient)      Caller : Brittney  Date of Procedure: 10/18/22  Surgeon: Rowdy  Location:         Sedation Type: MAC l   Conscious Sedation- Needs  for 6 hours after the procedure  MAC/General-Needs  for 24 hours after procedure    NA :[Pre-op Required] at [facility]  [facility]  MG and OR for MAC sedation   (advise patient they will need a pre-op WITH IN 30 DAYS of procedure date)     Type of Procedure Scheduled:   Lower Endoscopy [Colonoscopy]    Which Colonoscopy Prep was Sent?:   Extended -     [facility] CF PATIENTS & [facility]S PATIENTS NEEDS EXTENDED PREP       Informed patient they will need an adult  Yes  Cannot take any type of public or medical transportation alone    Pre-Procedure Covid test to be completed at [facility] or Externally: HOME  **INFORMED OF HOME TESTING & LAB OPTION**        Confirmed Nurse will call to complete assessment Yes    Additional comments:

## 2022-08-09 ENCOUNTER — MYC MEDICAL ADVICE (OUTPATIENT)
Dept: FAMILY MEDICINE | Facility: OTHER | Age: 47
End: 2022-08-09

## 2022-08-10 NOTE — TELEPHONE ENCOUNTER
Sent MyChart reply to patient. Paperwork complete and faxed. Closing encounter.    Deana Escalera

## 2022-09-27 ENCOUNTER — MYC REFILL (OUTPATIENT)
Dept: FAMILY MEDICINE | Facility: OTHER | Age: 47
End: 2022-09-27

## 2022-09-27 DIAGNOSIS — G43.909 MIGRAINE WITHOUT STATUS MIGRAINOSUS, NOT INTRACTABLE, UNSPECIFIED MIGRAINE TYPE: ICD-10-CM

## 2022-09-28 DIAGNOSIS — G43.909 MIGRAINE WITHOUT STATUS MIGRAINOSUS, NOT INTRACTABLE, UNSPECIFIED MIGRAINE TYPE: ICD-10-CM

## 2022-09-30 RX ORDER — SUMATRIPTAN 25 MG/1
TABLET, FILM COATED ORAL
Qty: 18 TABLET | Refills: 0 | OUTPATIENT
Start: 2022-09-30

## 2022-09-30 RX ORDER — SUMATRIPTAN 25 MG/1
25 TABLET, FILM COATED ORAL
Qty: 18 TABLET | Refills: 1 | Status: SHIPPED | OUTPATIENT
Start: 2022-09-30 | End: 2023-03-31

## 2022-10-09 ENCOUNTER — HEALTH MAINTENANCE LETTER (OUTPATIENT)
Age: 47
End: 2022-10-09

## 2022-10-17 ENCOUNTER — ANESTHESIA EVENT (OUTPATIENT)
Dept: GASTROENTEROLOGY | Facility: CLINIC | Age: 47
End: 2022-10-17
Payer: COMMERCIAL

## 2022-10-17 ASSESSMENT — LIFESTYLE VARIABLES: TOBACCO_USE: 0

## 2022-10-17 NOTE — ANESTHESIA PREPROCEDURE EVALUATION
Anesthesia Pre-Procedure Evaluation    Patient: Brittney Moon   MRN: [id number] : 1975        Procedure : Procedure(s):  COLONOSCOPY          Past Medical History:   Diagnosis Date     Other convulsions     seizure disorder     Unspecified osteomyelitis, lower leg       Past Surgical History:   Procedure Laterality Date     EXCISE GANGLION WRIST Left 2018    Procedure: EXCISION LEFT WRIST GANGLION CYST;  Surgeon: Kehinde Pino DO;  Location: [facility]     INJECT FACET JOINT Bilateral 2019    Procedure: lumbar sacral facet injection bilateral 4-5 sacral 1;  Surgeon: Canelo Zamora MD;  Location: [facility] OR     ZZC AMPUTATION LOW LEG THRU TIB/FIB      Below knee amputation secondary to osteomyelitis      Allergies   Allergen Reactions     Food      cilantro     Coriander Oil Rash      Social History     Tobacco Use     Smoking status: Never     Smokeless tobacco: Never   Substance Use Topics     Alcohol use: Yes     Comment: rare      Wt Readings from Last 1 Encounters:   22 97.1 kg (214 lb)        Anesthesia Evaluation   Pt has had prior anesthetic. Type: General.        ROS/MED HX  ENT/Pulmonary:    (-) tobacco use   Neurologic:  - neg neurologic ROS     Cardiovascular:  - neg cardiovascular ROS     METS/Exercise Tolerance:     Hematologic:  - neg hematologic  ROS     Musculoskeletal: Comment: Chronic low back pain  Left BKA      GI/Hepatic:  - neg GI/hepatic ROS     Renal/Genitourinary:  - neg Renal ROS     Endo: Comment: BMI:  43.22    (+) type II DM, Last HgA1c: 5.2, date: 2022, Not using insulin, - not using insulin pump. Obesity,     Psychiatric/Substance Use:     (+) psychiatric history anxiety and depression H/O chronic opiod use .     Infectious Disease:       Malignancy:       Other:      (+) , H/O Chronic Pain,        Physical Exam    Airway  airway exam normal      Mallampati: II   TM distance: > 3 FB   Neck ROM: full   Mouth opening: > 3 cm    Respiratory Devices and  Support         Dental  no notable dental history         Cardiovascular   cardiovascular exam normal       Rhythm and rate: regular and normal     Pulmonary   pulmonary exam normal        breath sounds clear to auscultation           OUTSIDE LABS:  CBC:   Lab Results   Component Value Date    WBC 5.8 01/06/2022    WBC 9.3 08/23/2021    HGB 15.0 01/06/2022    HGB 13.2 08/23/2021    HCT 45.5 01/06/2022    HCT 40.4 08/23/2021     01/06/2022     08/23/2021     BMP:   Lab Results   Component Value Date     01/06/2022     08/23/2021    POTASSIUM 3.9 01/06/2022    POTASSIUM 3.2 (L) 08/23/2021    CHLORIDE 113 (H) 01/06/2022    CHLORIDE 113 (H) 08/23/2021    CO2 20 01/06/2022    CO2 24 08/23/2021    BUN 8 01/06/2022    BUN 10 08/23/2021    CR 0.80 01/06/2022    CR 0.96 08/23/2021    GLC 83 01/06/2022     (H) 08/23/2021     COAGS: No results found for: PTT, INR, FIBR  POC:   Lab Results   Component Value Date     (H) 11/29/2019    HCG Negative 11/29/2019     HEPATIC: No results found for: ALBUMIN, PROTTOTAL, ALT, AST, GGT, ALKPHOS, BILITOTAL, BILIDIRECT, NEETA  OTHER:   Lab Results   Component Value Date    A1C 5.2 07/29/2022    IMELDA 9.0 01/06/2022    TSH 2.61 10/19/2018       Anesthesia Plan    ASA Status:  3   NPO Status:  NPO Appropriate    Anesthesia Type: MAC.     - Reason for MAC: straight local not clinically adequate   Induction: Intravenous, Propofol.   Maintenance: TIVA.        Consents    Anesthesia Plan(s) and associated risks, benefits, and realistic alternatives discussed. Questions answered and patient/representative(s) expressed understanding.    - Discussed:     - Discussed with:  Patient      - Extended Intubation/Ventilatory Support Discussed: No.      - Patient is DNR/DNI Status: No    Use of blood products discussed: No .     Postoperative Care    Pain management: IV analgesics.        Comments:    Other Comments: The risks and benefits of anesthesia, and the  alternatives where applicable, have been discussed with the patient, and they wish to proceed.            Brenton Márquez, APRN CRNA

## 2022-10-17 NOTE — H&P
[facility] Anesthesia Pre-op History and Physical    Brittney Moon MRN# [id number]   Age: 46 year old YOB: 1975      Date of Surgery: 10/18/2022 Location [facility]      Date of Exam 10/18/2022 Facility (Same day)       Home clinic: [facility]  Primary care provider: No Ref-Primary, Physician         Chief Complaint and/or Reason for Procedure:   No chief complaint on file.  Colonoscopy         Active problem list:     Patient Active Problem List    Diagnosis Date Noted     Complex regional pain syndrome i of left lower limb 07/29/2022     Priority: Medium     Opioid dependence (H) 07/29/2022     Priority: Medium     Depression, major, single episode, severe (H) 01/06/2022     Priority: Medium     Chronic low back pain 08/23/2021     Priority: Medium     Inability to bear weight 02/11/2021     Priority: Medium     Fall at home, initial encounter 01/06/2020     Priority: Medium     Left hip pain 01/06/2020     Priority: Medium     Ganglion cyst 11/19/2018     Priority: Medium     Type 2 diabetes mellitus without complication, without long-term current use of insulin (H) 10/21/2018     Priority: Medium     Morbid obesity (H) 10/21/2018     Priority: Medium     Amputation of leg (H) 11/17/2017     Priority: Medium     Formatting of this note might be different from the original.  Overview:   15 surgeries; following club foot repair and osteomyelitis as childe       History of right knee joint replacement 11/17/2017     Priority: Medium     Anxiety disorder due to medical condition 08/03/2010     Priority: Medium            Medications (include herbals and vitamins):   Any Plavix use in the last 7 days? No     No current facility-administered medications for this encounter.     Current Outpatient Medications   Medication Sig     SUMAtriptan (IMITREX) 25 MG tablet Take 1 tablet (25 mg) by mouth at onset of headache for migraine     cyanocobalamin  (VITAMIN B-12) 1000 MCG tablet Take by mouth daily     FLUoxetine (PROZAC) 40 MG capsule Take 40 mg by mouth daily Taking 2 40 mg tablets     HYDROcodone-acetaminophen (NORCO) 5-325 MG tablet Take 1 tablet by mouth 1 tablet twice daily     ketorolac (TORADOL) 10 MG tablet 1 tablet with food or milk as needed     metFORMIN (GLUCOPHAGE-XR) 750 MG 24 hr tablet Take 1 tablet (750 mg) by mouth 2 times daily (with meals)     methocarbamol (ROBAXIN) 500 MG tablet TAKE ONE TABLET BY MOUTH FOUR TIMES A DAY AS NEEDED (Patient taking differently: TAKE ONE TABLET BY MOUTH twoTIMES A DAY AS NEEDED)     nortriptyline (PAMELOR) 25 MG capsule Take 25 mg by mouth At Bedtime     potassium chloride ER (KLOR-CON M) 20 MEQ CR tablet Take one tablet twice daily for 1 week followed by one tablet daily. (Patient not taking: Reported on 7/29/2022)     simvastatin (ZOCOR) 5 MG tablet Take 1 tablet (5 mg) by mouth At Bedtime     topiramate (TOPAMAX) 100 MG tablet Take 100 mg by mouth 2 times daily     topiramate (TOPAMAX) 50 MG tablet Take 50 mg by mouth 2 times daily     traZODone (DESYREL) 100 MG tablet Take 100 mg by mouth At Bedtime     VITAMIN D PO 1 tablet daily             Allergies:      Allergies   Allergen Reactions     Food      cilantro     Coriander Oil Rash     Allergy to Latex? No  Allergy to tape?   No  Intolerances:             Physical Exam:   All vitals have been reviewed  No data found.  No intake/output data recorded.  Lungs:   No increased work of breathing, good air exchange, clear to auscultation bilaterally, no crackles or wheezing     Cardiovascular:   Normal apical impulse, regular rate and rhythm, normal S1 and S2, no S3 or S4, and no murmur noted             Lab / Radiology Results:            Anesthetic risk and/or ASA classification:       Micah Reno MD

## 2022-10-18 ENCOUNTER — ANESTHESIA (OUTPATIENT)
Dept: GASTROENTEROLOGY | Facility: CLINIC | Age: 47
End: 2022-10-18
Payer: COMMERCIAL

## 2022-10-18 ENCOUNTER — HOSPITAL ENCOUNTER (OUTPATIENT)
Facility: CLINIC | Age: 47
Discharge: HOME | End: 2022-10-18
Attending: INTERNAL MEDICINE | Admitting: INTERNAL MEDICINE
Payer: COMMERCIAL

## 2022-10-18 VITALS
HEART RATE: 70 BPM | RESPIRATION RATE: 18 BRPM | TEMPERATURE: 98.2 F | OXYGEN SATURATION: 97 % | SYSTOLIC BLOOD PRESSURE: 131 MMHG | DIASTOLIC BLOOD PRESSURE: 82 MMHG

## 2022-10-18 DIAGNOSIS — Z12.11 SCREENING FOR COLON CANCER: Primary | ICD-10-CM

## 2022-10-18 LAB — COLONOSCOPY: NORMAL

## 2022-10-18 PROCEDURE — 45380 COLONOSCOPY AND BIOPSY: CPT | Performed by: INTERNAL MEDICINE

## 2022-10-18 PROCEDURE — 250N000011 HC RX IP 250 OP 636: Performed by: NURSE ANESTHETIST, CERTIFIED REGISTERED

## 2022-10-18 PROCEDURE — 370N000017 HC ANESTHESIA TECHNICAL FEE, PER MIN: Performed by: INTERNAL MEDICINE

## 2022-10-18 PROCEDURE — 250N000009 HC RX 250: Performed by: NURSE ANESTHETIST, CERTIFIED REGISTERED

## 2022-10-18 PROCEDURE — 88305 TISSUE EXAM BY PATHOLOGIST: CPT | Mod: 26 | Performed by: PATHOLOGY

## 2022-10-18 PROCEDURE — 45385 COLONOSCOPY W/LESION REMOVAL: CPT | Mod: PT | Performed by: INTERNAL MEDICINE

## 2022-10-18 PROCEDURE — 258N000003 HC RX IP 258 OP 636: Performed by: NURSE ANESTHETIST, CERTIFIED REGISTERED

## 2022-10-18 PROCEDURE — 88305 TISSUE EXAM BY PATHOLOGIST: CPT | Mod: TC | Performed by: INTERNAL MEDICINE

## 2022-10-18 RX ORDER — ONDANSETRON 2 MG/ML
4 INJECTION INTRAMUSCULAR; INTRAVENOUS EVERY 6 HOURS PRN
Status: DISCONTINUED | OUTPATIENT
Start: 2022-10-18 | End: 2022-10-18 | Stop reason: HOSPADM

## 2022-10-18 RX ORDER — SODIUM CHLORIDE, SODIUM LACTATE, POTASSIUM CHLORIDE, CALCIUM CHLORIDE 600; 310; 30; 20 MG/100ML; MG/100ML; MG/100ML; MG/100ML
INJECTION, SOLUTION INTRAVENOUS CONTINUOUS
Status: DISCONTINUED | OUTPATIENT
Start: 2022-10-18 | End: 2022-10-18 | Stop reason: HOSPADM

## 2022-10-18 RX ORDER — LIDOCAINE HYDROCHLORIDE 20 MG/ML
INJECTION, SOLUTION INFILTRATION; PERINEURAL PRN
Status: DISCONTINUED | OUTPATIENT
Start: 2022-10-18 | End: 2022-10-18

## 2022-10-18 RX ORDER — PROPOFOL 10 MG/ML
INJECTION, EMULSION INTRAVENOUS CONTINUOUS PRN
Status: DISCONTINUED | OUTPATIENT
Start: 2022-10-18 | End: 2022-10-18

## 2022-10-18 RX ORDER — PROPOFOL 10 MG/ML
INJECTION, EMULSION INTRAVENOUS PRN
Status: DISCONTINUED | OUTPATIENT
Start: 2022-10-18 | End: 2022-10-18

## 2022-10-18 RX ADMIN — PROPOFOL 100 MG: 10 INJECTION, EMULSION INTRAVENOUS at 08:33

## 2022-10-18 RX ADMIN — ONDANSETRON 4 MG: 2 INJECTION INTRAMUSCULAR; INTRAVENOUS at 09:09

## 2022-10-18 RX ADMIN — LIDOCAINE HYDROCHLORIDE 1 ML: 10 INJECTION, SOLUTION EPIDURAL; INFILTRATION; INTRACAUDAL; PERINEURAL at 08:12

## 2022-10-18 RX ADMIN — LIDOCAINE HYDROCHLORIDE 60 MG: 20 INJECTION, SOLUTION INFILTRATION; PERINEURAL at 08:33

## 2022-10-18 RX ADMIN — SODIUM CHLORIDE, POTASSIUM CHLORIDE, SODIUM LACTATE AND CALCIUM CHLORIDE: 600; 310; 30; 20 INJECTION, SOLUTION INTRAVENOUS at 08:12

## 2022-10-18 RX ADMIN — PROPOFOL 150 MCG/KG/MIN: 10 INJECTION, EMULSION INTRAVENOUS at 08:33

## 2022-10-18 ASSESSMENT — ACTIVITIES OF DAILY LIVING (ADL): ADLS_ACUITY_SCORE: 35

## 2022-10-18 NOTE — LETTER
"October 24, 2022      Gavi Moon  [address]    [place] [address]        Dear ,    We are writing to inform you of your test results.    A repeat exam in 7 yrs is suggested.  Benign polyps with small pre-malignant features were removed.      Resulted Orders   Surgical Pathology Exam   Result Value Ref Range    Case Report       Surgical Pathology Report                         Case: [id number]                                  Authorizing Provider:  Micah Reno MD        Collected:           10/18/2022 08:42 AM          Ordering Location:     [facility]          Received:            10/18/2022 09:40 AM                                 [facility]                                                          Pathologist:           Ayden Wolf MD PhD                                                      Specimens:   A) - Large Intestine, Colon, Ascending, Ascending colon Polyp                                       B) - Large Intestine, Colon, Descending, Descending colon Poyp                             Final Diagnosis       A(1).  Colon, Ascending, polyp, polypectomy:  -Tubular adenoma  -Negative for high-grade dysplasia and malignancy.      B(2).  Colon, Descending, polyp, polypectomy:  -Tubular adenoma  -Negative for high-grade dysplasia and malignancy.          Clinical Information       Procedure:  COLONOSCOPY, WITH POLYPECTOMY  Pre-op Diagnosis: Screen for colon cancer [Z12.11]  Post-op Diagnosis: Z12.11 - Screen for colon cancer [ICD-10-CM]      Gross Description       A(1). Large Intestine, Colon, Ascending, Ascending colon Polyp:  The specimen is received in formalin, labeled with the patient's name, medical record number and other identifying information designated \"ascending colon polyp\". It consists of a 0.5 x 0.3 x 0.2 cm pale-tan polyp.  The resected surface of the specimen is entirely inked black, the specimen is bisected and submitted entirely in 1 " "cassette.    B(2). Large Intestine, Colon, Descending, Descending colon Poyp:  The specimen is received in formalin, labeled with the patient's name, medical record number and other identifying information designated \"descending colon polyp\". It consists of 2, 0.7-1.0 cm pale-tan, irregular polyp fragments admixed with colonic debris which may obscure smaller tissue fragments.  The specimen is entirely submitted in 1 cassette.   (Paola Black)      Microscopic Description       Microscopic examination was performed.      Performing Labs       The technical component of this testing was completed at [facility]      Case Images         If you have any questions or concerns, please call the clinic at the number listed above.       Sincerely,      Micah Reno MD          "

## 2022-10-18 NOTE — ANESTHESIA POSTPROCEDURE EVALUATION
Patient: Brittney Moon    Procedure: Procedure(s):  COLONOSCOPY, WITH POLYPECTOMY       Anesthesia Type:  MAC    Note:  Disposition: Outpatient   Postop Pain Control: Uneventful            Sign Out: Well controlled pain   PONV: No   Neuro/Psych: Uneventful            Sign Out: Acceptable/Baseline neuro status   Airway/Respiratory: Uneventful            Sign Out: Acceptable/Baseline resp. status   CV/Hemodynamics: Uneventful            Sign Out: Acceptable CV status   Other NRE: NONE   DID A NON-ROUTINE EVENT OCCUR? No    Event details/Postop Comments:  Pt was happy with anesthesia care.  No complications.  I will follow up with the pt if needed.           Last vitals:  Vitals Value Taken Time   /82 10/18/22 0900   Temp     Pulse 67 10/18/22 0900   Resp     SpO2 98 % 10/18/22 0905   Vitals shown include unvalidated device data.    Electronically Signed By: MARIA ANTONIA Callahan CRNA  October 18, 2022  9:06 AM

## 2022-10-18 NOTE — ANESTHESIA CARE TRANSFER NOTE
Patient: Brittney Moon    Procedure: Procedure(s):  COLONOSCOPY, WITH POLYPECTOMY       Diagnosis: Screen for colon cancer [Z12.11]  Diagnosis Additional Information: No value filed.    Anesthesia Type:   MAC     Note:    Oropharynx: spontaneously breathing  Level of Consciousness: awake  Oxygen Supplementation: room air    Independent Airway: airway patency satisfactory and stable  Dentition: dentition unchanged  Vital Signs Stable: post-procedure vital signs reviewed and stable  Report to RN Given: handoff report given  Patient transferred to: Phase II    Handoff Report: Identifed the Patient, Identified the Reponsible Provider, Reviewed the pertinent medical history, Discussed the surgical course, Reviewed Intra-OP anesthesia mangement and issues during anesthesia, Set expectations for post-procedure period and Allowed opportunity for questions and acknowledgement of understanding      Vitals:  Vitals Value Taken Time   /82 10/18/22 0900   Temp     Pulse 67 10/18/22 0900   Resp     SpO2 98 % 10/18/22 0905   Vitals shown include unvalidated device data.    Electronically Signed By: MARIA ANTONIA Callahan CRNA  October 18, 2022  9:06 AM

## 2022-10-18 NOTE — DISCHARGE INSTRUCTIONS
[facility]    Home Care Following Endoscopy          Activity:    You have just undergone an endoscopic procedure performed with sedation.  Do not work or operate machinery (including a car) for at least 12 hours.        Diet:  Return to the diet you were on before your procedure but eat lightly for the first 12-24 hours.  Drink plenty of water.  Resume any regular medications unless otherwise advised by your physician.    You had a biopsy or polyp removed.  Please refrain from aspirin or aspirin products for 2 days.  If on Coumadin please restart as instructed by your physician.   Pain:  You may take Tylenol as needed for pain.  Expected Recovery:    You can expect some mild abdominal fullness and/or discomfort due to the air used to inflate your intestinal tract. I encourage you to walk and attempt to pass this air as soon as possible.  Call Your Physician if You Have:    After Colonoscopy:  Worsening persisting abdominal pain which is worse with activity.  Fevers (>101 degrees F), chills or shakes.  Passage of continued blood with bowel movements.     Any questions or concerns about your recovery, please call [phone] or after hours [phone] Nurse Advice Line.    Follow-up Care:  You did have polyps/biopsy tissue sample(s) removed.  The polyps/biopsy tissue sample(s) will be sent to pathology.    You should receive a letter in your My Chart with your results within 1-2 weeks. If you do not participate in My Chart a physical letter will come in the mail in 2-3 weeks.  Please call if you have not received a notification of your results.

## 2022-10-19 LAB
PATH REPORT.COMMENTS IMP SPEC: NORMAL
PATH REPORT.COMMENTS IMP SPEC: NORMAL
PATH REPORT.FINAL DX SPEC: NORMAL
PATH REPORT.GROSS SPEC: NORMAL
PATH REPORT.MICROSCOPIC SPEC OTHER STN: NORMAL
PATH REPORT.RELEVANT HX SPEC: NORMAL
PHOTO IMAGE: NORMAL

## 2022-11-21 ENCOUNTER — TELEPHONE (OUTPATIENT)
Dept: FAMILY MEDICINE | Facility: OTHER | Age: 47
End: 2022-11-21

## 2022-11-21 NOTE — TELEPHONE ENCOUNTER
Reason for Call:  Appointment Request    Patient requesting this type of appt:  Hospital/ED Follow-Up     Requested provider: any    Reason patient unable to be scheduled: Not within requested timeframe    When does patient want to be seen/preferred time: 7-10 days    Comments: Originally requested Cyndee but because he moved to ER pt would like any [facility] provider available. Lower extremity amputation so she does not drive so will have to make sure she has a ride.    Could we send this information to you in [facility] or would you prefer to receive a phone call?:   Patient would prefer a phone call   Okay to leave a detailed message?: Yes at Home number on file [phone] (home)    Call taken on 11/21/2022 at 2:19 PM by Benji Lundberg

## 2022-12-08 NOTE — TELEPHONE ENCOUNTER
Writer called patient and apologized for delay in response to her message. Patient stating she was having issues with kidney stones also, so she just went back to [facility] for her follow up. Niurka Rivera LPN

## 2022-12-11 ENCOUNTER — APPOINTMENT (OUTPATIENT)
Dept: CT IMAGING | Facility: CLINIC | Age: 47
End: 2022-12-11
Attending: PHYSICIAN ASSISTANT
Payer: COMMERCIAL

## 2022-12-11 ENCOUNTER — APPOINTMENT (OUTPATIENT)
Dept: GENERAL RADIOLOGY | Facility: CLINIC | Age: 47
End: 2022-12-11
Attending: PHYSICIAN ASSISTANT
Payer: COMMERCIAL

## 2022-12-11 ENCOUNTER — HOSPITAL ENCOUNTER (OUTPATIENT)
Facility: CLINIC | Age: 47
Setting detail: OBSERVATION
Discharge: HOME HEALTH | End: 2022-12-14
Attending: INTERNAL MEDICINE | Admitting: INTERNAL MEDICINE
Payer: COMMERCIAL

## 2022-12-11 DIAGNOSIS — R26.89 INABILITY TO BEAR WEIGHT: ICD-10-CM

## 2022-12-11 DIAGNOSIS — S99.911A ANKLE INJURY, RIGHT, INITIAL ENCOUNTER: Primary | ICD-10-CM

## 2022-12-11 LAB
ALBUMIN SERPL-MCNC: 3.1 G/DL (ref 3.4–5)
ALP SERPL-CCNC: 72 U/L (ref 40–150)
ALT SERPL W P-5'-P-CCNC: 17 U/L (ref 0–50)
ANION GAP SERPL CALCULATED.3IONS-SCNC: 5 MMOL/L (ref 3–14)
AST SERPL W P-5'-P-CCNC: 11 U/L (ref 0–45)
BILIRUB SERPL-MCNC: 0.2 MG/DL (ref 0.2–1.3)
BUN SERPL-MCNC: 27 MG/DL (ref 7–30)
CALCIUM SERPL-MCNC: 8.8 MG/DL (ref 8.5–10.1)
CHLORIDE BLD-SCNC: 117 MMOL/L (ref 94–109)
CO2 SERPL-SCNC: 20 MMOL/L (ref 20–32)
CREAT SERPL-MCNC: 0.9 MG/DL (ref 0.52–1.04)
ERYTHROCYTE [DISTWIDTH] IN BLOOD BY AUTOMATED COUNT: 13.1 % (ref 10–15)
GFR SERPL CREATININE-BSD FRML MDRD: 78 ML/MIN/1.73M2
GLUCOSE BLD-MCNC: 82 MG/DL (ref 70–99)
GLUCOSE BLDC GLUCOMTR-MCNC: 131 MG/DL (ref 70–99)
HBA1C MFR BLD: 5.4 % (ref 0–5.6)
HCT VFR BLD AUTO: 38.3 % (ref 35–47)
HGB BLD-MCNC: 12.2 G/DL (ref 11.7–15.7)
MAGNESIUM SERPL-MCNC: 2.1 MG/DL (ref 1.6–2.3)
MCH RBC QN AUTO: 30.2 PG (ref 26.5–33)
MCHC RBC AUTO-ENTMCNC: 31.9 G/DL (ref 31.5–36.5)
MCV RBC AUTO: 95 FL (ref 78–100)
PHOSPHATE SERPL-MCNC: 4.8 MG/DL (ref 2.5–4.5)
PLATELET # BLD AUTO: 271 10E3/UL (ref 150–450)
POTASSIUM BLD-SCNC: 4 MMOL/L (ref 3.4–5.3)
PROT SERPL-MCNC: 6.3 G/DL (ref 6.8–8.8)
RBC # BLD AUTO: 4.04 10E6/UL (ref 3.8–5.2)
SODIUM SERPL-SCNC: 142 MMOL/L (ref 133–144)
WBC # BLD AUTO: 8.5 10E3/UL (ref 4–11)

## 2022-12-11 PROCEDURE — 99207 PR APP CREDIT; MD BILLING SHARED VISIT: CPT | Performed by: PHYSICIAN ASSISTANT

## 2022-12-11 PROCEDURE — 99219 PR INITIAL OBSERVATION CARE,LEVEL II: CPT | Performed by: INTERNAL MEDICINE

## 2022-12-11 PROCEDURE — 73562 X-RAY EXAM OF KNEE 3: CPT | Mod: RT

## 2022-12-11 PROCEDURE — 83036 HEMOGLOBIN GLYCOSYLATED A1C: CPT | Performed by: PHYSICIAN ASSISTANT

## 2022-12-11 PROCEDURE — 84100 ASSAY OF PHOSPHORUS: CPT | Performed by: PHYSICIAN ASSISTANT

## 2022-12-11 PROCEDURE — G0378 HOSPITAL OBSERVATION PER HR: HCPCS

## 2022-12-11 PROCEDURE — 73700 CT LOWER EXTREMITY W/O DYE: CPT | Mod: RT,XS

## 2022-12-11 PROCEDURE — 250N000013 HC RX MED GY IP 250 OP 250 PS 637: Performed by: PHYSICIAN ASSISTANT

## 2022-12-11 PROCEDURE — 83735 ASSAY OF MAGNESIUM: CPT | Performed by: PHYSICIAN ASSISTANT

## 2022-12-11 PROCEDURE — 73700 CT LOWER EXTREMITY W/O DYE: CPT | Mod: RT

## 2022-12-11 PROCEDURE — 80053 COMPREHEN METABOLIC PANEL: CPT | Performed by: PHYSICIAN ASSISTANT

## 2022-12-11 PROCEDURE — 250N000011 HC RX IP 250 OP 636: Performed by: PHYSICIAN ASSISTANT

## 2022-12-11 PROCEDURE — 82962 GLUCOSE BLOOD TEST: CPT

## 2022-12-11 PROCEDURE — 36415 COLL VENOUS BLD VENIPUNCTURE: CPT | Performed by: PHYSICIAN ASSISTANT

## 2022-12-11 PROCEDURE — 85027 COMPLETE CBC AUTOMATED: CPT | Performed by: PHYSICIAN ASSISTANT

## 2022-12-11 RX ORDER — HYDROMORPHONE HYDROCHLORIDE 1 MG/ML
.3-.5 INJECTION, SOLUTION INTRAMUSCULAR; INTRAVENOUS; SUBCUTANEOUS
Status: DISCONTINUED | OUTPATIENT
Start: 2022-12-11 | End: 2022-12-13

## 2022-12-11 RX ORDER — NALOXONE HYDROCHLORIDE 0.4 MG/ML
0.4 INJECTION, SOLUTION INTRAMUSCULAR; INTRAVENOUS; SUBCUTANEOUS
Status: DISCONTINUED | OUTPATIENT
Start: 2022-12-11 | End: 2022-12-14 | Stop reason: HOSPADM

## 2022-12-11 RX ORDER — SUMATRIPTAN 25 MG/1
25 TABLET, FILM COATED ORAL
Status: DISCONTINUED | OUTPATIENT
Start: 2022-12-11 | End: 2022-12-14 | Stop reason: HOSPADM

## 2022-12-11 RX ORDER — METHOCARBAMOL 500 MG/1
500 TABLET, FILM COATED ORAL 4 TIMES DAILY PRN
Status: DISCONTINUED | OUTPATIENT
Start: 2022-12-11 | End: 2022-12-14 | Stop reason: HOSPADM

## 2022-12-11 RX ORDER — TRAZODONE HYDROCHLORIDE 100 MG/1
100 TABLET ORAL AT BEDTIME
Status: DISCONTINUED | OUTPATIENT
Start: 2022-12-11 | End: 2022-12-14 | Stop reason: HOSPADM

## 2022-12-11 RX ORDER — NALOXONE HYDROCHLORIDE 0.4 MG/ML
0.2 INJECTION, SOLUTION INTRAMUSCULAR; INTRAVENOUS; SUBCUTANEOUS
Status: DISCONTINUED | OUTPATIENT
Start: 2022-12-11 | End: 2022-12-14 | Stop reason: HOSPADM

## 2022-12-11 RX ORDER — AMOXICILLIN 250 MG
1 CAPSULE ORAL 2 TIMES DAILY PRN
Status: DISCONTINUED | OUTPATIENT
Start: 2022-12-11 | End: 2022-12-14 | Stop reason: HOSPADM

## 2022-12-11 RX ORDER — ACETAMINOPHEN 500 MG
500 TABLET ORAL EVERY 4 HOURS PRN
Status: DISCONTINUED | OUTPATIENT
Start: 2022-12-11 | End: 2022-12-14 | Stop reason: HOSPADM

## 2022-12-11 RX ORDER — LIDOCAINE 40 MG/G
CREAM TOPICAL
Status: DISCONTINUED | OUTPATIENT
Start: 2022-12-11 | End: 2022-12-14 | Stop reason: HOSPADM

## 2022-12-11 RX ORDER — ONDANSETRON 4 MG/1
4 TABLET, FILM COATED ORAL EVERY 6 HOURS PRN
Status: DISCONTINUED | OUTPATIENT
Start: 2022-12-11 | End: 2022-12-14 | Stop reason: HOSPADM

## 2022-12-11 RX ORDER — OXYCODONE HYDROCHLORIDE 5 MG/1
5 TABLET ORAL EVERY 4 HOURS PRN
Status: DISCONTINUED | OUTPATIENT
Start: 2022-12-11 | End: 2022-12-14 | Stop reason: HOSPADM

## 2022-12-11 RX ORDER — SIMVASTATIN 5 MG
5 TABLET ORAL AT BEDTIME
Status: DISCONTINUED | OUTPATIENT
Start: 2022-12-11 | End: 2022-12-14 | Stop reason: HOSPADM

## 2022-12-11 RX ORDER — AMOXICILLIN 250 MG
2 CAPSULE ORAL 2 TIMES DAILY PRN
Status: DISCONTINUED | OUTPATIENT
Start: 2022-12-11 | End: 2022-12-14 | Stop reason: HOSPADM

## 2022-12-11 RX ORDER — HYDROXYZINE HYDROCHLORIDE 25 MG/1
25 TABLET, FILM COATED ORAL EVERY 6 HOURS PRN
Status: DISCONTINUED | OUTPATIENT
Start: 2022-12-11 | End: 2022-12-14 | Stop reason: HOSPADM

## 2022-12-11 RX ORDER — NICOTINE POLACRILEX 4 MG
15-30 LOZENGE BUCCAL
Status: DISCONTINUED | OUTPATIENT
Start: 2022-12-11 | End: 2022-12-14 | Stop reason: HOSPADM

## 2022-12-11 RX ORDER — DEXTROSE MONOHYDRATE 25 G/50ML
25-50 INJECTION, SOLUTION INTRAVENOUS
Status: DISCONTINUED | OUTPATIENT
Start: 2022-12-11 | End: 2022-12-14 | Stop reason: HOSPADM

## 2022-12-11 RX ORDER — HYDROXYZINE HYDROCHLORIDE 50 MG/1
50 TABLET, FILM COATED ORAL EVERY 6 HOURS PRN
Status: DISCONTINUED | OUTPATIENT
Start: 2022-12-11 | End: 2022-12-14 | Stop reason: HOSPADM

## 2022-12-11 RX ADMIN — TRAZODONE HYDROCHLORIDE 100 MG: 100 TABLET ORAL at 21:42

## 2022-12-11 RX ADMIN — ONDANSETRON 4 MG: 4 TABLET ORAL at 23:33

## 2022-12-11 RX ADMIN — METHOCARBAMOL 500 MG: 500 TABLET ORAL at 23:33

## 2022-12-11 RX ADMIN — TOPIRAMATE 150 MG: 50 TABLET ORAL at 22:42

## 2022-12-11 RX ADMIN — OXYCODONE HYDROCHLORIDE 5 MG: 5 TABLET ORAL at 20:45

## 2022-12-11 RX ADMIN — ACETAMINOPHEN 500 MG: 500 TABLET ORAL at 20:45

## 2022-12-11 RX ADMIN — SIMVASTATIN 5 MG: 5 TABLET, FILM COATED ORAL at 23:33

## 2022-12-11 ASSESSMENT — ACTIVITIES OF DAILY LIVING (ADL)
ADLS_ACUITY_SCORE: 37
ADLS_ACUITY_SCORE: 35
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
## 2022-12-11 NOTE — LETTER
[facility] MED SURG ORTHOPEDIC  [address] [address]  Phone: [phone]  Fax: [phone]    December 14, 2022        Brittney Moon  [address]    [place] [address]          To whom it may concern:    RE: Brittney Moon    Patient was admitted to the hospital from 12/11/2022 to 12/14/2022. Please excuse her from work until Monday, 12/19/2022.    Please contact me for questions or concerns.      Sincerely,              Jevon Kunz DO, MHS

## 2022-12-12 ENCOUNTER — APPOINTMENT (OUTPATIENT)
Dept: PHYSICAL THERAPY | Facility: CLINIC | Age: 47
End: 2022-12-12
Attending: PHYSICIAN ASSISTANT
Payer: COMMERCIAL

## 2022-12-12 ENCOUNTER — APPOINTMENT (OUTPATIENT)
Dept: GENERAL RADIOLOGY | Facility: CLINIC | Age: 47
End: 2022-12-12
Attending: INTERNAL MEDICINE
Payer: COMMERCIAL

## 2022-12-12 LAB
GLUCOSE BLDC GLUCOMTR-MCNC: 104 MG/DL (ref 70–99)
GLUCOSE BLDC GLUCOMTR-MCNC: 108 MG/DL (ref 70–99)
GLUCOSE BLDC GLUCOMTR-MCNC: 120 MG/DL (ref 70–99)

## 2022-12-12 PROCEDURE — G0378 HOSPITAL OBSERVATION PER HR: HCPCS

## 2022-12-12 PROCEDURE — 999N000111 HC STATISTIC OT IP EVAL DEFER

## 2022-12-12 PROCEDURE — 82962 GLUCOSE BLOOD TEST: CPT

## 2022-12-12 PROCEDURE — 250N000011 HC RX IP 250 OP 636: Performed by: INTERNAL MEDICINE

## 2022-12-12 PROCEDURE — 97530 THERAPEUTIC ACTIVITIES: CPT | Mod: GP

## 2022-12-12 PROCEDURE — 97161 PT EVAL LOW COMPLEX 20 MIN: CPT | Mod: GP

## 2022-12-12 PROCEDURE — 250N000011 HC RX IP 250 OP 636: Performed by: PHYSICIAN ASSISTANT

## 2022-12-12 PROCEDURE — 99225 PR SUBSEQUENT OBSERVATION CARE,LEVEL II: CPT | Performed by: INTERNAL MEDICINE

## 2022-12-12 PROCEDURE — 96376 TX/PRO/DX INJ SAME DRUG ADON: CPT

## 2022-12-12 PROCEDURE — 250N000013 HC RX MED GY IP 250 OP 250 PS 637: Performed by: INTERNAL MEDICINE

## 2022-12-12 PROCEDURE — 73630 X-RAY EXAM OF FOOT: CPT | Mod: RT

## 2022-12-12 PROCEDURE — 96374 THER/PROPH/DIAG INJ IV PUSH: CPT

## 2022-12-12 PROCEDURE — 96372 THER/PROPH/DIAG INJ SC/IM: CPT | Mod: XS | Performed by: INTERNAL MEDICINE

## 2022-12-12 PROCEDURE — 250N000013 HC RX MED GY IP 250 OP 250 PS 637: Performed by: PHYSICIAN ASSISTANT

## 2022-12-12 RX ORDER — ENOXAPARIN SODIUM 100 MG/ML
40 INJECTION SUBCUTANEOUS EVERY 24 HOURS
Status: DISCONTINUED | OUTPATIENT
Start: 2022-12-12 | End: 2022-12-14 | Stop reason: HOSPADM

## 2022-12-12 RX ORDER — VITAMIN B COMPLEX
25 TABLET ORAL DAILY
COMMUNITY

## 2022-12-12 RX ORDER — VITAMIN B COMPLEX
25 TABLET ORAL DAILY
Status: DISCONTINUED | OUTPATIENT
Start: 2022-12-12 | End: 2022-12-14 | Stop reason: HOSPADM

## 2022-12-12 RX ADMIN — ACETAMINOPHEN 500 MG: 500 TABLET ORAL at 04:53

## 2022-12-12 RX ADMIN — ENOXAPARIN SODIUM 40 MG: 40 INJECTION SUBCUTANEOUS at 13:22

## 2022-12-12 RX ADMIN — HYDROMORPHONE HYDROCHLORIDE 0.5 MG: 1 INJECTION, SOLUTION INTRAMUSCULAR; INTRAVENOUS; SUBCUTANEOUS at 07:25

## 2022-12-12 RX ADMIN — OXYCODONE HYDROCHLORIDE 5 MG: 5 TABLET ORAL at 04:53

## 2022-12-12 RX ADMIN — METHOCARBAMOL 500 MG: 500 TABLET ORAL at 04:53

## 2022-12-12 RX ADMIN — HYDROMORPHONE HYDROCHLORIDE 0.5 MG: 1 INJECTION, SOLUTION INTRAMUSCULAR; INTRAVENOUS; SUBCUTANEOUS at 11:20

## 2022-12-12 RX ADMIN — METHOCARBAMOL 500 MG: 500 TABLET ORAL at 09:10

## 2022-12-12 RX ADMIN — FLUOXETINE 80 MG: 20 CAPSULE ORAL at 09:09

## 2022-12-12 RX ADMIN — SIMVASTATIN 5 MG: 5 TABLET, FILM COATED ORAL at 22:44

## 2022-12-12 RX ADMIN — HYDROMORPHONE HYDROCHLORIDE 0.5 MG: 1 INJECTION, SOLUTION INTRAMUSCULAR; INTRAVENOUS; SUBCUTANEOUS at 19:06

## 2022-12-12 RX ADMIN — HYDROXYZINE HYDROCHLORIDE 25 MG: 25 TABLET, FILM COATED ORAL at 15:50

## 2022-12-12 RX ADMIN — OXYCODONE HYDROCHLORIDE 5 MG: 5 TABLET ORAL at 15:50

## 2022-12-12 RX ADMIN — OXYCODONE HYDROCHLORIDE 5 MG: 5 TABLET ORAL at 22:50

## 2022-12-12 RX ADMIN — HYDROMORPHONE HYDROCHLORIDE 0.5 MG: 1 INJECTION, SOLUTION INTRAMUSCULAR; INTRAVENOUS; SUBCUTANEOUS at 00:25

## 2022-12-12 RX ADMIN — Medication 25 MCG: at 13:22

## 2022-12-12 RX ADMIN — TOPIRAMATE 150 MG: 50 TABLET ORAL at 09:09

## 2022-12-12 RX ADMIN — TOPIRAMATE 150 MG: 50 TABLET ORAL at 22:44

## 2022-12-12 RX ADMIN — OXYCODONE HYDROCHLORIDE 5 MG: 5 TABLET ORAL at 09:08

## 2022-12-12 RX ADMIN — METHOCARBAMOL 500 MG: 500 TABLET ORAL at 19:06

## 2022-12-12 ASSESSMENT — ACTIVITIES OF DAILY LIVING (ADL)
ADLS_ACUITY_SCORE: 37
ADLS_ACUITY_SCORE: 33
ADLS_ACUITY_SCORE: 37
ADLS_ACUITY_SCORE: 37
ADLS_ACUITY_SCORE: 33
ADLS_ACUITY_SCORE: 33
ADLS_ACUITY_SCORE: 37
ADLS_ACUITY_SCORE: 33
ADLS_ACUITY_SCORE: 33

## 2022-12-12 ASSESSMENT — COLUMBIA-SUICIDE SEVERITY RATING SCALE - C-SSRS
4. HAVE YOU HAD THESE THOUGHTS AND HAD SOME INTENTION OF ACTING ON THEM?: NO
3. HAVE YOU BEEN THINKING ABOUT HOW YOU MIGHT KILL YOURSELF?: NO
5. HAVE YOU STARTED TO WORK OUT OR WORKED OUT THE DETAILS OF HOW TO KILL YOURSELF? DO YOU INTEND TO CARRY OUT THIS PLAN?: NO
1. IN THE PAST MONTH, HAVE YOU WISHED YOU WERE DEAD OR WISHED YOU COULD GO TO SLEEP AND NOT WAKE UP?: NO
2. HAVE YOU ACTUALLY HAD ANY THOUGHTS OF KILLING YOURSELF IN THE PAST MONTH?: NO
6. HAVE YOU EVER DONE ANYTHING, STARTED TO DO ANYTHING, OR PREPARED TO DO ANYTHING TO END YOUR LIFE?: NO

## 2022-12-12 NOTE — PLAN OF CARE
Progress Note:    Pulmonary:LS clear  bilaterally. On room air. No SOB reported.      Activity:Asssist of one with Ads; Lift with tranfers.Pt hadn't been out of bed during with this shift except with PT using sliding board.    /GI: uses bedpan and/or bedside commode.LBM 12/10/2022.    Skin: Intact    Pain:Pt has been reporting pain to her right LE rating 7-8/10. PRN oxycodone, Dilaudid and Robaxin utilized with relief.    Neuro/CMS: Alert an oriented x 4; CMS intact.    Dressing:None    IV/Drains: Left PIV;SL.      Plan:TBD.

## 2022-12-12 NOTE — PHARMACY-ADMISSION MEDICATION HISTORY
Admission Medication History Completed by Pharmacy    See [facility] Admission Navigator for allergy information, preferred outpatient pharmacy, prior to admission medications and immunization status.     Medication History Sources:     Patient    Rx fill history     Changes made to PTA medication list (reason):    Added: None    Deleted: None    Changed: Methocarbamol from prn to BID per patient    Additional Information:    Metformin, patient confirmed this was stopped this year     H/o seizures per chart review - patient confirmed that she takes topiramate as part of her pain regimen. She confirmed that she currently does not take any medications of her h/o seizures.     Hydrocodone-acetaminophen 5-325 mg tablets laslt filled 11/23/22 for #60 tablet (30 day supply) - this is regularly filled monthly     Prior to Admission medications    Medication Sig Last Dose Taking? Auth Provider Long Term End Date   cyanocobalamin (VITAMIN B-12) 1000 MCG tablet Take 1,000 mcg by mouth daily 12/10/2022 at AM Yes Reported, Patient     FLUoxetine (PROZAC) 40 MG capsule Take 80 mg by mouth every morning 12/10/2022 at AM Yes Reported, Patient Yes    HYDROcodone-acetaminophen (NORCO) 5-325 MG tablet Take 1 tablet by mouth 2 times daily 12/10/2022 Yes Reported, Patient     ketorolac (TORADOL) 10 MG tablet Take 10 mg by mouth every 6 hours as needed for moderate pain (4-6) Past Month Yes Reported, Patient     methocarbamol (ROBAXIN) 500 MG tablet TAKE ONE TABLET BY MOUTH FOUR TIMES A DAY AS NEEDED  Patient taking differently: Take 500 mg by mouth 2 times daily 12/10/2022 Yes Pelon Cotter PA-C     simvastatin (ZOCOR) 5 MG tablet Take 1 tablet (5 mg) by mouth At Bedtime 12/10/2022 Yes Pelon Cotter PA-C Yes    SUMAtriptan (IMITREX) 25 MG tablet Take 1 tablet (25 mg) by mouth at onset of headache for migraine prn Yes Pelon Cotter PA-C     topiramate (TOPAMAX) 100 MG tablet Take 100 mg by mouth 2 times daily Take with 50 mg  for totally dose of 150 mg twice daily 12/10/2022 Yes Reported, Patient Yes    topiramate (TOPAMAX) 50 MG tablet Take 50 mg by mouth 2 times daily Take with 100 mg tablet for total dose of 150 mg twice daily 12/10/2022 Yes Reported, Patient Yes    traZODone (DESYREL) 100 MG tablet Take 100 mg by mouth At Bedtime 12/10/2022 Yes Reported, Patient Yes    Vitamin D3 (CHOLECALCIFEROL) 25 mcg (1000 units) tablet Take 25 mcg by mouth daily 12/10/2022 Yes Unknown, Entered By History         Date completed: 12/12/22    Medication history completed by: Antelmo Hutchins RP

## 2022-12-12 NOTE — PROGRESS NOTES
A/Ox's 4. Pt rated pain as tolerable. IV Dilaudid, Tylenol, Oxycodone, Robaxin and Ice packs given for pain control.  CMS to baseline. Tolerated regular diet. Denied any nausea, CP, SOB, lightheadedness or dizziness. Voiding without pain or difficulty per bed pan. Resting in bed at this time with call light in reach. Able to make needs known. Continue to monitor.
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
## 2022-12-12 NOTE — PLAN OF CARE
VS: /80 (BP Location: Left arm)   Pulse 73   Temp (!) 96.3  F (35.7  C) (Oral)   Resp 18   SpO2 95%     O2: Room air>90%. No SOB noted or Chest pain   Output: Voids using bedpan. Pt will need help using bedpan   Last BM: 12/10/22 per pt report   Activity: Lift device to transfer.pt was able to shift weight to cart and back to bed  without using lift device.    Skin: Below knee amputation    Pain: Right knee pain and right ankle managed with Oxycodone 5mg, tylenol and other PRN meds   CMS: AXO X 4. Tingling on right leg per pt report.  No sensation on left side of amputation    Dressing: none   Diet: 60 g CHO per diet. BG checks at bedtime 131   LDA: No IV  access.   Equipment: PT belongings   Plan: TBD   Additional Info:      PT arrived to the unit around 7:45 pm from  [facility].  Came in with lots of pain and nausea due to pain. Pain meds administered  and pt  pain improved. Pt had CT and x-ray done at 10:15 PM. Awaiting results.  Pt VS stable. Will continue to monitor

## 2022-12-12 NOTE — PLAN OF CARE
[facility]  OUTPATIENT PHYSICAL THERAPY EVALUATION  PLAN OF TREATMENT FOR OUTPATIENT REHABILITATION  (COMPLETE FOR INITIAL CLAIMS ONLY)  Patient's Last Name, First Name, M.I.  YOB: 1975  Brittney Moon                        Provider's Name  [facility] Medical Record No.  [id number]                             Onset Date:  12/11/22   Start of Care Date:      Type:     _X_PT   ___OT   ___SLP Medical Diagnosis:                 PT Diagnosis:  impaired functional mobility Visits from SOC:  1     See note for plan of treatment, functional goals and certification details    I CERTIFY THE NEED FOR THESE SERVICES FURNISHED UNDER        THIS PLAN OF TREATMENT AND WHILE UNDER MY CARE     (Physician co-signature of this document indicates review and certification of the therapy plan).
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
## 2022-12-12 NOTE — PROGRESS NOTES
SPIRITUAL HEALTH SERVICES  SPIRITUAL ASSESSMENT Progress Note  [facility] [place] [place]THO R 0551 12/12/22      REFERRAL SOURCE: Self Referral    Pt declined visit with Unit . She mentioned not having any spiritual needs today. Pt stated she would reach out to [facility] if she felt like her spiritual needs  have to be addressed.    PLAN: No follow up necessary.    Cedric Granger MA, MPA  Associate   Pager: [phone]

## 2022-12-12 NOTE — PLAN OF CARE
Occupational Therapy: Orders received. Chart reviewed and discussed with care team.? Occupational Therapy not indicated due to patient being able to complete BADLs at mod I from sitting position. Patient's main limitation is ability to transfer which is being addressed by PT, anticipate once patient is able to self transfer she will have no OT needs until WB status changes? Defer discharge recommendations to Physical Therapy.? Will complete orders.

## 2022-12-13 ENCOUNTER — APPOINTMENT (OUTPATIENT)
Dept: PHYSICAL THERAPY | Facility: CLINIC | Age: 47
End: 2022-12-13
Attending: INTERNAL MEDICINE
Payer: COMMERCIAL

## 2022-12-13 LAB
GLUCOSE BLDC GLUCOMTR-MCNC: 105 MG/DL (ref 70–99)
GLUCOSE BLDC GLUCOMTR-MCNC: 106 MG/DL (ref 70–99)
GLUCOSE BLDC GLUCOMTR-MCNC: 109 MG/DL (ref 70–99)
GLUCOSE BLDC GLUCOMTR-MCNC: 115 MG/DL (ref 70–99)
GLUCOSE BLDC GLUCOMTR-MCNC: 120 MG/DL (ref 70–99)

## 2022-12-13 PROCEDURE — 250N000011 HC RX IP 250 OP 636: Performed by: INTERNAL MEDICINE

## 2022-12-13 PROCEDURE — 250N000011 HC RX IP 250 OP 636: Performed by: PHYSICIAN ASSISTANT

## 2022-12-13 PROCEDURE — 99225 PR SUBSEQUENT OBSERVATION CARE,LEVEL II: CPT | Performed by: INTERNAL MEDICINE

## 2022-12-13 PROCEDURE — 82962 GLUCOSE BLOOD TEST: CPT

## 2022-12-13 PROCEDURE — 250N000013 HC RX MED GY IP 250 OP 250 PS 637: Performed by: PHYSICIAN ASSISTANT

## 2022-12-13 PROCEDURE — 97530 THERAPEUTIC ACTIVITIES: CPT | Mod: GP

## 2022-12-13 PROCEDURE — G0378 HOSPITAL OBSERVATION PER HR: HCPCS

## 2022-12-13 PROCEDURE — 96376 TX/PRO/DX INJ SAME DRUG ADON: CPT

## 2022-12-13 PROCEDURE — 250N000013 HC RX MED GY IP 250 OP 250 PS 637: Performed by: INTERNAL MEDICINE

## 2022-12-13 PROCEDURE — 96372 THER/PROPH/DIAG INJ SC/IM: CPT | Performed by: INTERNAL MEDICINE

## 2022-12-13 RX ADMIN — METHOCARBAMOL 500 MG: 500 TABLET ORAL at 19:39

## 2022-12-13 RX ADMIN — HYDROXYZINE HYDROCHLORIDE 25 MG: 25 TABLET, FILM COATED ORAL at 17:34

## 2022-12-13 RX ADMIN — OXYCODONE HYDROCHLORIDE 5 MG: 5 TABLET ORAL at 15:10

## 2022-12-13 RX ADMIN — ACETAMINOPHEN 500 MG: 500 TABLET ORAL at 19:39

## 2022-12-13 RX ADMIN — OXYCODONE HYDROCHLORIDE 5 MG: 5 TABLET ORAL at 19:39

## 2022-12-13 RX ADMIN — OXYCODONE HYDROCHLORIDE 5 MG: 5 TABLET ORAL at 23:51

## 2022-12-13 RX ADMIN — FLUOXETINE 80 MG: 20 CAPSULE ORAL at 08:08

## 2022-12-13 RX ADMIN — OXYCODONE HYDROCHLORIDE 5 MG: 5 TABLET ORAL at 09:38

## 2022-12-13 RX ADMIN — ACETAMINOPHEN 500 MG: 500 TABLET ORAL at 05:38

## 2022-12-13 RX ADMIN — ENOXAPARIN SODIUM 40 MG: 40 INJECTION SUBCUTANEOUS at 11:42

## 2022-12-13 RX ADMIN — HYDROXYZINE HYDROCHLORIDE 25 MG: 25 TABLET, FILM COATED ORAL at 09:38

## 2022-12-13 RX ADMIN — METHOCARBAMOL 500 MG: 500 TABLET ORAL at 11:42

## 2022-12-13 RX ADMIN — ACETAMINOPHEN 500 MG: 500 TABLET ORAL at 15:10

## 2022-12-13 RX ADMIN — TOPIRAMATE 150 MG: 50 TABLET ORAL at 19:39

## 2022-12-13 RX ADMIN — TOPIRAMATE 150 MG: 50 TABLET ORAL at 08:08

## 2022-12-13 RX ADMIN — SIMVASTATIN 5 MG: 5 TABLET, FILM COATED ORAL at 23:51

## 2022-12-13 RX ADMIN — TRAZODONE HYDROCHLORIDE 100 MG: 100 TABLET ORAL at 23:51

## 2022-12-13 RX ADMIN — OXYCODONE HYDROCHLORIDE 5 MG: 5 TABLET ORAL at 05:38

## 2022-12-13 RX ADMIN — METHOCARBAMOL 500 MG: 500 TABLET ORAL at 05:37

## 2022-12-13 RX ADMIN — HYDROMORPHONE HYDROCHLORIDE 0.5 MG: 1 INJECTION, SOLUTION INTRAMUSCULAR; INTRAVENOUS; SUBCUTANEOUS at 00:58

## 2022-12-13 RX ADMIN — Medication 25 MCG: at 08:08

## 2022-12-13 RX ADMIN — HYDROMORPHONE HYDROCHLORIDE 0.3 MG: 1 INJECTION, SOLUTION INTRAMUSCULAR; INTRAVENOUS; SUBCUTANEOUS at 11:48

## 2022-12-13 ASSESSMENT — ACTIVITIES OF DAILY LIVING (ADL)
ADLS_ACUITY_SCORE: 33
ADLS_ACUITY_SCORE: 34
ADLS_ACUITY_SCORE: 32
ADLS_ACUITY_SCORE: 32
ADLS_ACUITY_SCORE: 34
ADLS_ACUITY_SCORE: 34
ADLS_ACUITY_SCORE: 37
ADLS_ACUITY_SCORE: 32
ADLS_ACUITY_SCORE: 37
DEPENDENT_IADLS:: INDEPENDENT
ADLS_ACUITY_SCORE: 34
ADLS_ACUITY_SCORE: 32
ADLS_ACUITY_SCORE: 31
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
## 2022-12-13 NOTE — PLAN OF CARE
Goal Outcome Evaluation:    VS: /65   Pulse 74   Temp (!) 96.6  F (35.9  C) (Oral)   Resp 16   SpO2 95%    O2: Sats >90%.   Output: Void without difficulty to bedpan.     Last BM: LBM 12/10. Passing gas.    Activity: Non-WB on RLE. Pt transfers independently bed to stretcher.     Skin: Scars, +3 edema on R ankle.   Pain: Pain managed with prn oxycodone, Atrax , robaxin and IV dilaudid for breakthrough pain.   Neuro: A&O X4. CMS intact.   Dressing: None.    Diet: Tolerating regular diet. Tolerating well.    LDA: PIV infusing LR at 100 ml/hr into left forearm.   Equipment: IV pole, patient personal dressing    Plan: TBD. will continue to monitor.   Additional Info:

## 2022-12-13 NOTE — PLAN OF CARE
VS: /50 (BP Location: Right arm, Patient Position: Sitting)   Pulse 79   Temp 98.5  F (36.9  C) (Oral)   Resp 16   SpO2 90%     O2: Room air>90%. No SOB noted or Chest pain   Output: Voids using bedpan. Pt will need help using bedpan   Last BM: 12/10/22 per pt report   Activity: Non- WB on RLE. Slide board transfer per Physical therapy note. PT has not been OOB this shift.    Skin: Swelling on right ankle  Below knee amputation    Pain: Pain 7-8/10 Right knee  and right ankle managed with IV dilaudid 0.5 mg , Oxycodone, tylenol and Robaxin     CMS: Intact AXO X 4.   No sensation on left side of amputation    Dressing: none   Diet: Regular  diet. BG checks at bedtime   LDA: Left PIV  sl   Equipment: PT belongings, commode   Plan: TBD   Additional Info:
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
## 2022-12-13 NOTE — PLAN OF CARE
VS: VSS, pt denied CP or SOB.     O2: On room air sat. > 90%.   Output: Voids using bedpan. Pt will need help using bedpan   Last BM: 12/10/22 per pt report   Activity: Non- WB on RLE. Slide board transfer if getting up to WC.    Skin: Intact.   Pain: Comfortably manageable with PRN medication.     CMS: CMS and neuro intact to baseline.    Dressing: none   Diet: Regular  diet. BG checks before meal and at bedtime.    LDA: Left PIV  SL.   Equipment: Commode and personal belongings.    Plan: TBD   Additional Info:
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
## 2022-12-14 ENCOUNTER — APPOINTMENT (OUTPATIENT)
Dept: PHYSICAL THERAPY | Facility: CLINIC | Age: 47
End: 2022-12-14
Attending: INTERNAL MEDICINE
Payer: COMMERCIAL

## 2022-12-14 VITALS
DIASTOLIC BLOOD PRESSURE: 68 MMHG | OXYGEN SATURATION: 100 % | SYSTOLIC BLOOD PRESSURE: 110 MMHG | TEMPERATURE: 95.8 F | HEART RATE: 59 BPM | RESPIRATION RATE: 15 BRPM

## 2022-12-14 LAB
ALBUMIN SERPL-MCNC: 2.8 G/DL (ref 3.4–5)
ALP SERPL-CCNC: 64 U/L (ref 40–150)
ALT SERPL W P-5'-P-CCNC: 16 U/L (ref 0–50)
ANION GAP SERPL CALCULATED.3IONS-SCNC: 2 MMOL/L (ref 3–14)
AST SERPL W P-5'-P-CCNC: 7 U/L (ref 0–45)
BASOPHILS # BLD AUTO: 0 10E3/UL (ref 0–0.2)
BASOPHILS NFR BLD AUTO: 0 %
BILIRUB SERPL-MCNC: 0.3 MG/DL (ref 0.2–1.3)
BUN SERPL-MCNC: 27 MG/DL (ref 7–30)
CALCIUM SERPL-MCNC: 8.6 MG/DL (ref 8.5–10.1)
CHLORIDE BLD-SCNC: 116 MMOL/L (ref 94–109)
CO2 SERPL-SCNC: 24 MMOL/L (ref 20–32)
CREAT SERPL-MCNC: 0.82 MG/DL (ref 0.52–1.04)
EOSINOPHIL # BLD AUTO: 0.4 10E3/UL (ref 0–0.7)
EOSINOPHIL NFR BLD AUTO: 6 %
ERYTHROCYTE [DISTWIDTH] IN BLOOD BY AUTOMATED COUNT: 13 % (ref 10–15)
GFR SERPL CREATININE-BSD FRML MDRD: 88 ML/MIN/1.73M2
GLUCOSE BLD-MCNC: 119 MG/DL (ref 70–99)
GLUCOSE BLDC GLUCOMTR-MCNC: 134 MG/DL (ref 70–99)
GLUCOSE BLDC GLUCOMTR-MCNC: 92 MG/DL (ref 70–99)
HCT VFR BLD AUTO: 37 % (ref 35–47)
HGB BLD-MCNC: 11.8 G/DL (ref 11.7–15.7)
IMM GRANULOCYTES # BLD: 0 10E3/UL
IMM GRANULOCYTES NFR BLD: 0 %
LYMPHOCYTES # BLD AUTO: 2.7 10E3/UL (ref 0.8–5.3)
LYMPHOCYTES NFR BLD AUTO: 39 %
MCH RBC QN AUTO: 30 PG (ref 26.5–33)
MCHC RBC AUTO-ENTMCNC: 31.9 G/DL (ref 31.5–36.5)
MCV RBC AUTO: 94 FL (ref 78–100)
MONOCYTES # BLD AUTO: 0.5 10E3/UL (ref 0–1.3)
MONOCYTES NFR BLD AUTO: 7 %
NEUTROPHILS # BLD AUTO: 3.2 10E3/UL (ref 1.6–8.3)
NEUTROPHILS NFR BLD AUTO: 48 %
NRBC # BLD AUTO: 0 10E3/UL
NRBC BLD AUTO-RTO: 0 /100
PLATELET # BLD AUTO: 224 10E3/UL (ref 150–450)
POTASSIUM BLD-SCNC: 3.9 MMOL/L (ref 3.4–5.3)
PROT SERPL-MCNC: 6.1 G/DL (ref 6.8–8.8)
RBC # BLD AUTO: 3.93 10E6/UL (ref 3.8–5.2)
SODIUM SERPL-SCNC: 142 MMOL/L (ref 133–144)
WBC # BLD AUTO: 6.8 10E3/UL (ref 4–11)

## 2022-12-14 PROCEDURE — 80053 COMPREHEN METABOLIC PANEL: CPT | Performed by: INTERNAL MEDICINE

## 2022-12-14 PROCEDURE — 82962 GLUCOSE BLOOD TEST: CPT

## 2022-12-14 PROCEDURE — G0378 HOSPITAL OBSERVATION PER HR: HCPCS

## 2022-12-14 PROCEDURE — 250N000011 HC RX IP 250 OP 636: Performed by: INTERNAL MEDICINE

## 2022-12-14 PROCEDURE — 250N000013 HC RX MED GY IP 250 OP 250 PS 637: Performed by: INTERNAL MEDICINE

## 2022-12-14 PROCEDURE — 97530 THERAPEUTIC ACTIVITIES: CPT | Mod: GP

## 2022-12-14 PROCEDURE — 85004 AUTOMATED DIFF WBC COUNT: CPT | Performed by: INTERNAL MEDICINE

## 2022-12-14 PROCEDURE — 99217 PR OBSERVATION CARE DISCHARGE: CPT | Performed by: INTERNAL MEDICINE

## 2022-12-14 PROCEDURE — 250N000013 HC RX MED GY IP 250 OP 250 PS 637: Performed by: PHYSICIAN ASSISTANT

## 2022-12-14 PROCEDURE — 96372 THER/PROPH/DIAG INJ SC/IM: CPT | Performed by: INTERNAL MEDICINE

## 2022-12-14 PROCEDURE — 36415 COLL VENOUS BLD VENIPUNCTURE: CPT | Performed by: INTERNAL MEDICINE

## 2022-12-14 RX ORDER — HYDROCODONE BITARTRATE AND ACETAMINOPHEN 5; 325 MG/1; MG/1
1 TABLET ORAL 2 TIMES DAILY
Qty: 12 TABLET | Refills: 0 | Status: SHIPPED | OUTPATIENT
Start: 2022-12-14 | End: 2022-12-17

## 2022-12-14 RX ADMIN — ENOXAPARIN SODIUM 40 MG: 40 INJECTION SUBCUTANEOUS at 12:36

## 2022-12-14 RX ADMIN — OXYCODONE HYDROCHLORIDE 5 MG: 5 TABLET ORAL at 04:11

## 2022-12-14 RX ADMIN — Medication 25 MCG: at 09:20

## 2022-12-14 RX ADMIN — TOPIRAMATE 150 MG: 50 TABLET ORAL at 09:20

## 2022-12-14 RX ADMIN — ACETAMINOPHEN 500 MG: 500 TABLET ORAL at 04:11

## 2022-12-14 RX ADMIN — FLUOXETINE 80 MG: 20 CAPSULE ORAL at 09:19

## 2022-12-14 RX ADMIN — METHOCARBAMOL 500 MG: 500 TABLET ORAL at 04:11

## 2022-12-14 ASSESSMENT — ACTIVITIES OF DAILY LIVING (ADL)
ADLS_ACUITY_SCORE: 37
ADLS_ACUITY_SCORE: 39
ADLS_ACUITY_SCORE: 39
ADLS_ACUITY_SCORE: 37
ADLS_ACUITY_SCORE: 39

## 2022-12-14 NOTE — PLAN OF CARE
[facility]  OUTPATIENT PHYSICAL THERAPY EVALUATION  PLAN OF TREATMENT FOR OUTPATIENT REHABILITATION  (COMPLETE FOR INITIAL CLAIMS ONLY)  Patient's Last Name, First Name, M.I.  YOB: 1975  Brittney Moon                        Provider's Name  [facility] Medical Record No.  [id number]                             Onset Date:  12/11/22   Start of Care Date:      Type:     _X_PT   ___OT   ___SLP Medical Diagnosis:                 PT Diagnosis:  impaired functional mobility Visits from SOC:  1     See note for plan of treatment, functional goals and certification details    I CERTIFY THE NEED FOR THESE SERVICES FURNISHED UNDER        THIS PLAN OF TREATMENT AND WHILE UNDER MY CARE     (Physician co-signature of this document indicates review and certification of the therapy plan).

## 2022-12-14 NOTE — PLAN OF CARE
VS: VSS, pt denied CP or SOB.     O2: On room air sat. > 90%.   Output: Voids using bedpan. Pt will need help using bedpan   Last BM: 12/13/22 per pt report   Activity: Non- WB on RLE. Slide board transfer if getting up to WC.    Skin: Intact.   Pain: Comfortably manageable with PRN medication.     CMS: CMS and neuro intact to baseline.    Dressing: none   Diet: Regular  diet. BG checks before meal and at bedtime.    LDA: Left PIV  SL.   Equipment: Commode and personal belongings.    Plan: Discharge home today.   Additional Info:                 DISCHARGE SUMMARY    Pt discharging to: home.  Transportation: family.  AVS given and discussed: yes.  Stoplight Tool given and discussed: NA  Medications given: prescription sent to her home pharmacy.   Belongings returned: yes  Comments:   Pt understand discharge plan.

## 2022-12-14 NOTE — PLAN OF CARE
Physical Therapy Discharge Summary    Reason for therapy discharge:    All goals and outcomes met, no further needs identified.    Progress towards therapy goal(s). See goals on Care Plan in [facility] electronic health record for goal details.  Goals met    Therapy recommendation(s):    Continued therapy is recommended.  Rationale/Recommendations:  Recommend  PT for continued progression of strength and ROM, as well as functional mobility training as pt is able to tolerate more weightbearing to transition from a slide board to a pivot transfer. .

## 2022-12-14 NOTE — PROGRESS NOTES
A/Ox's 4. Pt rated pain as tolerable. Tylenol, Oxycodone, Robaxin and Ice packs given for pain control.  CMS to baseline. Tolerated regular diet. Denied any nausea, CP, SOB, lightheadedness or dizziness. Voiding without pain or difficulty per bed pan. Resting in bed at this time with call light in reach. Able to make needs known. Continue to monitor.
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
## 2022-12-15 ENCOUNTER — PATIENT OUTREACH (OUTPATIENT)
Dept: CARE COORDINATION | Facility: CLINIC | Age: 47
End: 2022-12-15
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
## 2023-01-02 ENCOUNTER — PATIENT OUTREACH (OUTPATIENT)
Dept: CARE COORDINATION | Facility: CLINIC | Age: 48
End: 2023-01-02

## 2023-01-02 NOTE — PROGRESS NOTES
Clinical Product Navigator RN reviewed chart; patient on payer product coverage.    Review results:   Patient identified on system report as potential for care coordination program and with no current PCP. Upon review, patient has an established PCP, engages with care team regularly.  Patient had recent outreach from Care Management team on 12/15/22 and declined care coordination services at that time.  RN Clinical Product Navigator will sent [employer] Call message to patient with writer's information and reminder to schedule annual wellness visit.  No further action at this time.    Melissa Behl BSN, RN, PHN, [facility]  RN Clinical Product Navigator  Ph: [phone]

## 2023-02-17 DIAGNOSIS — E11.9 TYPE 2 DIABETES MELLITUS WITHOUT COMPLICATION, WITHOUT LONG-TERM CURRENT USE OF INSULIN (H): ICD-10-CM

## 2023-02-21 RX ORDER — SIMVASTATIN 5 MG
5 TABLET ORAL AT BEDTIME
Qty: 90 TABLET | Refills: 0 | Status: SHIPPED | OUTPATIENT
Start: 2023-02-21 | End: 2023-06-01

## 2023-02-21 NOTE — TELEPHONE ENCOUNTER
Pending Prescriptions:                       Disp   Refills    simvastatin (ZOCOR) 5 MG tablet [Pharmacy*90 tab*0            Sig: TAKE 1 TABLET (5 MG) BY MOUTH AT BEDTIME    Medication is being filled for 1 time rip refill only due to:  Patient is due for diabetes follow-up     Please call and help schedule.  Thank you!

## 2023-04-11 ENCOUNTER — OFFICE VISIT (OUTPATIENT)
Dept: FAMILY MEDICINE | Facility: OTHER | Age: 48
End: 2023-04-11
Payer: COMMERCIAL

## 2023-04-11 VITALS
TEMPERATURE: 99 F | BODY MASS INDEX: 49.28 KG/M2 | OXYGEN SATURATION: 96 % | RESPIRATION RATE: 16 BRPM | DIASTOLIC BLOOD PRESSURE: 69 MMHG | HEART RATE: 76 BPM | WEIGHT: 244 LBS | SYSTOLIC BLOOD PRESSURE: 120 MMHG

## 2023-04-11 DIAGNOSIS — E78.5 HYPERLIPIDEMIA LDL GOAL <130: ICD-10-CM

## 2023-04-11 DIAGNOSIS — Z12.31 ENCOUNTER FOR SCREENING MAMMOGRAM FOR BREAST CANCER: ICD-10-CM

## 2023-04-11 DIAGNOSIS — E11.9 TYPE 2 DIABETES MELLITUS WITHOUT COMPLICATION, WITHOUT LONG-TERM CURRENT USE OF INSULIN (H): Primary | ICD-10-CM

## 2023-04-11 DIAGNOSIS — E66.01 MORBID OBESITY (H): ICD-10-CM

## 2023-04-11 DIAGNOSIS — S88.919A AMPUTATION OF LEG (H): ICD-10-CM

## 2023-04-11 PROBLEM — F32.2 DEPRESSION, MAJOR, SINGLE EPISODE, SEVERE (H): Status: RESOLVED | Noted: 2022-01-06 | Resolved: 2023-04-11

## 2023-04-11 LAB
ALBUMIN SERPL BCG-MCNC: 3.7 G/DL (ref 3.5–5.2)
ALP SERPL-CCNC: 105 U/L (ref 35–104)
ALT SERPL W P-5'-P-CCNC: 11 U/L (ref 10–35)
ANION GAP SERPL CALCULATED.3IONS-SCNC: 8 MMOL/L (ref 7–15)
AST SERPL W P-5'-P-CCNC: 16 U/L (ref 10–35)
BILIRUB SERPL-MCNC: <0.2 MG/DL
BUN SERPL-MCNC: 10.2 MG/DL (ref 6–20)
CALCIUM SERPL-MCNC: 9.3 MG/DL (ref 8.6–10)
CHLORIDE SERPL-SCNC: 108 MMOL/L (ref 98–107)
CREAT SERPL-MCNC: 1.01 MG/DL (ref 0.51–0.95)
DEPRECATED HCO3 PLAS-SCNC: 25 MMOL/L (ref 22–29)
GFR SERPL CREATININE-BSD FRML MDRD: 68 ML/MIN/1.73M2
GLUCOSE SERPL-MCNC: 88 MG/DL (ref 70–99)
HBA1C MFR BLD: 5.6 % (ref 0–5.6)
POTASSIUM SERPL-SCNC: 4.4 MMOL/L (ref 3.4–5.3)
PROT SERPL-MCNC: 6.6 G/DL (ref 6.4–8.3)
SODIUM SERPL-SCNC: 141 MMOL/L (ref 136–145)

## 2023-04-11 PROCEDURE — 80053 COMPREHEN METABOLIC PANEL: CPT | Performed by: PHYSICIAN ASSISTANT

## 2023-04-11 PROCEDURE — 99207 PR FOOT EXAM NO CHARGE: CPT | Performed by: PHYSICIAN ASSISTANT

## 2023-04-11 PROCEDURE — 36415 COLL VENOUS BLD VENIPUNCTURE: CPT | Performed by: PHYSICIAN ASSISTANT

## 2023-04-11 PROCEDURE — 0124A COVID-19 VACCINE BIVALENT BOOSTER 12+ (PFIZER): CPT | Performed by: PHYSICIAN ASSISTANT

## 2023-04-11 PROCEDURE — 91312 COVID-19 VACCINE BIVALENT BOOSTER 12+ (PFIZER): CPT | Performed by: PHYSICIAN ASSISTANT

## 2023-04-11 PROCEDURE — 99214 OFFICE O/P EST MOD 30 MIN: CPT | Mod: 25 | Performed by: PHYSICIAN ASSISTANT

## 2023-04-11 PROCEDURE — 83036 HEMOGLOBIN GLYCOSYLATED A1C: CPT | Performed by: PHYSICIAN ASSISTANT

## 2023-04-11 RX ORDER — METFORMIN HCL 500 MG
500 TABLET, EXTENDED RELEASE 24 HR ORAL 2 TIMES DAILY WITH MEALS
Qty: 180 TABLET | Refills: 1 | Status: SHIPPED | OUTPATIENT
Start: 2023-04-11 | End: 2023-10-02

## 2023-04-11 RX ORDER — METFORMIN HYDROCHLORIDE 750 MG/1
750 TABLET, EXTENDED RELEASE ORAL
COMMUNITY
End: 2023-04-11

## 2023-04-11 RX ORDER — METFORMIN HYDROCHLORIDE 750 MG/1
750 TABLET, EXTENDED RELEASE ORAL 2 TIMES DAILY WITH MEALS
Qty: 180 TABLET | Refills: 1 | Status: SHIPPED | OUTPATIENT
Start: 2023-04-11 | End: 2023-04-11

## 2023-04-11 ASSESSMENT — ANXIETY QUESTIONNAIRES
1. FEELING NERVOUS, ANXIOUS, OR ON EDGE: NOT AT ALL
GAD7 TOTAL SCORE: 0
GAD7 TOTAL SCORE: 0
7. FEELING AFRAID AS IF SOMETHING AWFUL MIGHT HAPPEN: NOT AT ALL
7. FEELING AFRAID AS IF SOMETHING AWFUL MIGHT HAPPEN: NOT AT ALL
4. TROUBLE RELAXING: NOT AT ALL
3. WORRYING TOO MUCH ABOUT DIFFERENT THINGS: NOT AT ALL
5. BEING SO RESTLESS THAT IT IS HARD TO SIT STILL: NOT AT ALL
GAD7 TOTAL SCORE: 0
IF YOU CHECKED OFF ANY PROBLEMS ON THIS QUESTIONNAIRE, HOW DIFFICULT HAVE THESE PROBLEMS MADE IT FOR YOU TO DO YOUR WORK, TAKE CARE OF THINGS AT HOME, OR GET ALONG WITH OTHER PEOPLE: NOT DIFFICULT AT ALL
8. IF YOU CHECKED OFF ANY PROBLEMS, HOW DIFFICULT HAVE THESE MADE IT FOR YOU TO DO YOUR WORK, TAKE CARE OF THINGS AT HOME, OR GET ALONG WITH OTHER PEOPLE?: NOT DIFFICULT AT ALL
6. BECOMING EASILY ANNOYED OR IRRITABLE: NOT AT ALL
2. NOT BEING ABLE TO STOP OR CONTROL WORRYING: NOT AT ALL

## 2023-04-11 ASSESSMENT — PATIENT HEALTH QUESTIONNAIRE - PHQ9
SUM OF ALL RESPONSES TO PHQ QUESTIONS 1-9: 0
10. IF YOU CHECKED OFF ANY PROBLEMS, HOW DIFFICULT HAVE THESE PROBLEMS MADE IT FOR YOU TO DO YOUR WORK, TAKE CARE OF THINGS AT HOME, OR GET ALONG WITH OTHER PEOPLE: NOT DIFFICULT AT ALL
SUM OF ALL RESPONSES TO PHQ QUESTIONS 1-9: 0

## 2023-04-11 ASSESSMENT — PAIN SCALES - GENERAL: PAINLEVEL: SEVERE PAIN (6)
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
## 2023-04-11 NOTE — LETTER
My Depression Action Plan  Name: Brittney Moon   Date of Birth 1975  Date: 4/11/2023    My doctor: Pelon Cotter   My clinic: [address]  [facility] [id number] [phone]            GREEN    ZONE   Good Control    What it looks like:   Things are going generally well. You have normal ups and downs. You may even feel depressed from time to time, but bad moods usually last less than a day.   What you need to do:  Continue to care for yourself (see self care plan)  Check your depression survival kit and update it as needed  Follow your physician s recommendations including any medication.  Do not stop taking medication unless you consult with your physician first.             YELLOW         ZONE Getting Worse    What it looks like:   Depression is starting to interfere with your life.   It may be hard to get out of bed; you may be starting to isolate yourself from others.  Symptoms of depression are starting to last most all day and this has happened for several days.   You may have suicidal thoughts but they are not constant.   What you need to do:     Call your care team. Your response to treatment will improve if you keep your care team informed of your progress. Yellow periods are signs an adjustment may need to be made.     Continue your self-care.  Just get dressed and ready for the day.  Don't give yourself time to talk yourself out of it.    Talk to someone in your support network.    Open up your Depression Self-Care Plan/Wellness Kit.             RED    ZONE Medical Alert - Get Help    What it looks like:   Depression is seriously interfering with your life.   You may experience these or other symptoms: You can t get out of bed most days, can t work or engage in other necessary activities, you have trouble taking care of basic hygiene, or basic responsibilities, thoughts of suicide or death that will not go away, self-injurious  behavior.     What you need to do:  Call your care team and request a same-day appointment. If they are not available (weekends or after hours) call your local crisis line, emergency room or 911.          Depression Self-Care Plan / Wellness Kit    Many people find that medication and therapy are helpful treatments for managing depression. In addition, making small changes to your everyday life can help to boost your mood and improve your wellbeing. Below are some tips for you to consider. Be sure to talk with your medical provider and/or behavioral health consultant if your symptoms are worsening or not improving.     Sleep   Sleep hygiene  means all of the habits that support good, restful sleep. It includes maintaining a consistent bedtime and wake time, using your bedroom only for sleeping or sex, and keeping the bedroom dark and free of distractions like a computer, smartphone, or television.     Develop a Healthy Routine  Maintain good hygiene. Get out of bed in the morning, make your bed, brush your teeth, take a shower, and get dressed. Don t spend too much time viewing media that makes you feel stressed. Find time to relax each day.    Exercise  Get some form of exercise every day. This will help reduce pain and release endorphins, the  feel good  chemicals in your brain. It can be as simple as just going for a walk or doing some gardening, anything that will get you moving.      Diet  Strive to eat healthy foods, including fruits and vegetables. Drink plenty of water. Avoid excessive sugar, caffeine, alcohol, and other mood-altering substances.     Stay Connected with Others  Stay in touch with friends and family members.    Manage Your Mood  Try deep breathing, massage therapy, biofeedback, or meditation. Take part in fun activities when you can. Try to find something to smile about each day.     Psychotherapy  Be open to working with a therapist if your provider recommends it.     Medication  Be sure to  take your medication as prescribed. Most anti-depressants need to be taken every day. It usually takes several weeks for medications to work. Not all medicines work for all people. It is important to follow-up with your provider to make sure you have a treatment plan that is working for you. Do not stop your medication abruptly without first discussing it with your provider.    Crisis Resources   These hotlines are for both adults and children. They and are open 24 hours a day, 7 days a week unless noted otherwise.    National Suicide Prevention Lifeline   988 or 9-122-895-PITV (6335)    Crisis Text Line    www.crisistextline.org  Text HOME to 304737 from anywhere in the [place], anytime, about any type of crisis. A live, trained crisis counselor will receive the text and respond quickly.    [place] Lifeline for LGBTQ Youth  A national crisis intervention and suicide lifeline for LGBTQ youth under 25. Provides a safe place to talk without judgement. Call 1-877.483.7847; text START to 352023 or visit www.thetrevorproject.org to talk to a trained counselor.    For [facility] crisis numbers, visit the [facility] website at:  https://[place].gov/dhs/people-we-serve/adults/health-care/mental-health/resources/crisis-contacts.jsp

## 2023-04-24 DIAGNOSIS — G43.909 MIGRAINE WITHOUT STATUS MIGRAINOSUS, NOT INTRACTABLE, UNSPECIFIED MIGRAINE TYPE: ICD-10-CM

## 2023-04-24 RX ORDER — SUMATRIPTAN 25 MG/1
TABLET, FILM COATED ORAL
Qty: 18 TABLET | Refills: 0 | Status: SHIPPED | OUTPATIENT
Start: 2023-04-24 | End: 2023-05-26

## 2023-05-05 ENCOUNTER — HOSPITAL ENCOUNTER (OUTPATIENT)
Dept: MAMMOGRAPHY | Facility: CLINIC | Age: 48
Discharge: HOME | End: 2023-05-05
Attending: PHYSICIAN ASSISTANT | Admitting: PHYSICIAN ASSISTANT
Payer: COMMERCIAL

## 2023-05-05 DIAGNOSIS — Z12.31 ENCOUNTER FOR SCREENING MAMMOGRAM FOR BREAST CANCER: ICD-10-CM

## 2023-05-05 PROCEDURE — 77067 SCR MAMMO BI INCL CAD: CPT

## 2023-05-18 ENCOUNTER — APPOINTMENT (OUTPATIENT)
Dept: ULTRASOUND IMAGING | Facility: CLINIC | Age: 48
End: 2023-05-18
Attending: FAMILY MEDICINE
Payer: COMMERCIAL

## 2023-05-18 ENCOUNTER — HOSPITAL ENCOUNTER (EMERGENCY)
Facility: CLINIC | Age: 48
Discharge: HOME | End: 2023-05-18
Attending: FAMILY MEDICINE | Admitting: FAMILY MEDICINE
Payer: COMMERCIAL

## 2023-05-18 VITALS
DIASTOLIC BLOOD PRESSURE: 55 MMHG | SYSTOLIC BLOOD PRESSURE: 133 MMHG | TEMPERATURE: 97.9 F | RESPIRATION RATE: 18 BRPM | OXYGEN SATURATION: 96 % | HEART RATE: 65 BPM

## 2023-05-18 DIAGNOSIS — M79.632 PAIN OF LEFT FOREARM: ICD-10-CM

## 2023-05-18 DIAGNOSIS — I80.8 SUPERFICIAL THROMBOPHLEBITIS OF LEFT UPPER EXTREMITY: ICD-10-CM

## 2023-05-18 DIAGNOSIS — M77.9 TENDONITIS: ICD-10-CM

## 2023-05-18 LAB — D DIMER PPP FEU-MCNC: 0.63 UG/ML FEU (ref 0–0.5)

## 2023-05-18 PROCEDURE — 99284 EMERGENCY DEPT VISIT MOD MDM: CPT | Performed by: FAMILY MEDICINE

## 2023-05-18 PROCEDURE — 99284 EMERGENCY DEPT VISIT MOD MDM: CPT | Mod: 25 | Performed by: FAMILY MEDICINE

## 2023-05-18 PROCEDURE — 36415 COLL VENOUS BLD VENIPUNCTURE: CPT | Performed by: FAMILY MEDICINE

## 2023-05-18 PROCEDURE — 85379 FIBRIN DEGRADATION QUANT: CPT | Performed by: FAMILY MEDICINE

## 2023-05-18 PROCEDURE — 250N000013 HC RX MED GY IP 250 OP 250 PS 637: Performed by: FAMILY MEDICINE

## 2023-05-18 PROCEDURE — 93971 EXTREMITY STUDY: CPT | Mod: LT

## 2023-05-18 RX ORDER — HYDROCODONE BITARTRATE AND ACETAMINOPHEN 5; 325 MG/1; MG/1
1 TABLET ORAL ONCE
Status: COMPLETED | OUTPATIENT
Start: 2023-05-18 | End: 2023-05-18

## 2023-05-18 RX ORDER — OXYCODONE HYDROCHLORIDE 5 MG/1
10 TABLET ORAL ONCE
Status: COMPLETED | OUTPATIENT
Start: 2023-05-18 | End: 2023-05-18

## 2023-05-18 RX ADMIN — HYDROCODONE BITARTRATE AND ACETAMINOPHEN 1 TABLET: 5; 325 TABLET ORAL at 01:12

## 2023-05-18 RX ADMIN — OXYCODONE HYDROCHLORIDE 10 MG: 5 TABLET ORAL at 03:30

## 2023-05-18 ASSESSMENT — ACTIVITIES OF DAILY LIVING (ADL)
ADLS_ACUITY_SCORE: 35

## 2023-05-18 ASSESSMENT — ENCOUNTER SYMPTOMS: FEVER: 0

## 2023-05-18 NOTE — ED PROVIDER NOTES
History     Chief Complaint   Patient presents with     Arm Swelling/Pain     HPI  Brittney Moon is a 47 year old female who presents to the ED tonight with left forearm pain and swelling.  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  Started work around 3 PM and by 7 PM noticed that her hand seemed a little bit bigger and her forearm was sore from the elbow on down.  Some numbness in the forearm as well.  When her coworker came in to relieve her she noticed that her left hand seemed to be swollen.  No history of blood clots.  No significant neck pain over her baseline stiffness.    Allergies:  Allergies   Allergen Reactions     Food Anaphylaxis     cilantro     Coriandrum Sativum Rash       Problem List:    Patient Active Problem List    Diagnosis Date Noted     Complex regional pain syndrome i of left lower limb 07/29/2022     Priority: Medium     Opioid dependence (H) 07/29/2022     Priority: Medium     Chronic low back pain 08/23/2021     Priority: Medium     Inability to bear weight 02/11/2021     Priority: Medium     Fall at home, initial encounter 01/06/2020     Priority: Medium     Left hip pain 01/06/2020     Priority: Medium     Ganglion cyst 11/19/2018     Priority: Medium     Type 2 diabetes mellitus without complication, without long-term current use of insulin (H) 10/21/2018     Priority: Medium     Morbid obesity (H) 10/21/2018     Priority: Medium     Amputation of leg (H) 11/17/2017     Priority: Medium     Formatting of this note might be different from the original.  Overview:   15 surgeries; following club foot repair and osteomyelitis as childe       History of right knee joint replacement 11/17/2017     Priority: Medium     Anxiety disorder due to medical condition 08/03/2010     Priority: Medium        Past Medical History:    Past Medical History:   Diagnosis Date     Other convulsions      Unspecified osteomyelitis, lower leg        Past Surgical History:     Past Surgical History:   Procedure Laterality Date     COLONOSCOPY N/A 10/18/2022    Procedure: COLONOSCOPY, WITH POLYPECTOMY;  Surgeon: Micah Reno MD;  Location: [facility]     EXCISE GANGLION WRIST Left 12/24/2018    Procedure: EXCISION LEFT WRIST GANGLION CYST;  Surgeon: Kehinde Pino DO;  Location: [facility]     INJECT FACET JOINT Bilateral 11/29/2019    Procedure: lumbar sacral facet injection bilateral 4-5 sacral 1;  Surgeon: Canelo Zamora MD;  Location: [facility]     ZZC AMPUTATION LOW LEG THRU TIB/FIB      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    Social History:  Marital Status:  Single [1]  Social History     Tobacco Use     Smoking status: Never     Smokeless tobacco: Never   Vaping Use     Vaping status: Never Used     Passive vaping exposure: Yes   Substance Use Topics     Alcohol use: Yes     Comment: rare     Drug use: No        Medications:    cyanocobalamin (VITAMIN B-12) 1000 MCG tablet  FLUoxetine (PROZAC) 40 MG capsule  ketorolac (TORADOL) 10 MG tablet  metFORMIN (GLUCOPHAGE XR) 500 MG 24 hr tablet  methocarbamol (ROBAXIN) 500 MG tablet  simvastatin (ZOCOR) 5 MG tablet  SUMAtriptan (IMITREX) 25 MG tablet  topiramate (TOPAMAX) 100 MG tablet  topiramate (TOPAMAX) 50 MG tablet  traZODone (DESYREL) 100 MG tablet  Vitamin D3 (CHOLECALCIFEROL) 25 mcg (1000 units) tablet          Review of Systems   Constitutional: Negative for fever.       Physical Exam   BP: 133/55  Pulse: 65  Temp: 97.9  F (36.6  C)  Resp: 18  SpO2: 96 %      Physical Exam  Constitutional:       General: She is not in acute distress.     Appearance: Normal appearance.   Cardiovascular:      Pulses:           Radial pulses are 2+ on the left side.   Pulmonary:      Effort: Pulmonary effort is normal. No respiratory distress.   Musculoskeletal:      Left forearm: Swelling ( mild) and tenderness ( mild) present.      Left wrist: Swelling ( mild) and tenderness (  mild) present.      Left hand: Swelling ( mild) present.   Skin:     General: Skin is warm and dry.      Findings: No erythema.   Neurological:      General: No focal deficit present.      Mental Status: She is alert and oriented to person, place, and time.         ED Course                 Procedures              Critical Care time:  none               Results for orders placed or performed during the hospital encounter of 05/18/23 (from the past 24 hour(s))   D dimer quantitative   Result Value Ref Range    D-Dimer Quantitative 0.63 (H) 0.00 - 0.50 ug/mL FEU    Narrative    This D-dimer assay is intended for use in conjunction with a clinical pretest probability assessment model to exclude pulmonary embolism (PE) and deep venous thrombosis (DVT) in outpatients suspected of PE or DVT. The cut-off value is 0.50 ug/mL FEU.   US Upper Extremity Venous Duplex Left    Narrative    EXAM: US UPPER EXTREMITY VENOUS DUPLEX LEFT  LOCATION: [facility]  DATE/TIME: 5/18/2023 4:23 AM CDT    INDICATION: arm pain swelling, elevated d dimer  COMPARISON: None.  TECHNIQUE: Venous Duplex ultrasound of the left upper extremity with (when possible) and without compression, augmentation, and duplex. Color flow and spectral Doppler with waveform analysis performed.    FINDINGS: Ultrasound includes evaluation of the internal jugular vein, innominate vein, subclavian vein, axillary vein, and brachial vein. The superficial cephalic and basilic veins were also evaluated where seen.     LEFT: No deep venous thrombosis. Superficial thrombophlebitis left cephalic vein at elbow, 1 x 0.2 cm in size.      Impression    IMPRESSION:   1.  Superficial thrombophlebitis left cephalic vein at elbow.  2.  No deep venous thrombosis in the left upper extremity.       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  No history of blood clots.  D-dimer is elevated at 0.63.  Left upper extremity ultrasound was ordered.  She asked for something for pain and was given 1 tablet of Norco.  Still having discomfort so was given 10 mg of oxycodone while waiting for ultrasound.  Ultrasound showed no DVT.  A short 1 cm segment of superficial thrombophlebitis of the cephalic vein right at the elbow was noted.  She may have an element of tendinitis as well.  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  She should recheck in clinic if persistent problems.  Verbal and written discharge instructions given.  She is comfortable with this plan.     I have reviewed the nursing notes.    I have reviewed the findings, diagnosis, plan and need for follow up with the patient.           Medical Decision Making  The patient's presentation was of moderate complexity (an undiagnosed new problem with uncertain diagnosis).    The patient's evaluation involved:  ordering and/or review of 2 test(s) in this encounter (see separate area of note for details)    The patient's management necessitated moderate risk (prescription drug management including medications given in the ED).        New Prescriptions    No medications on file       Final diagnoses:   Pain of left forearm   Superficial thrombophlebitis of left upper extremity - Short segment of the cephalic vein at the elbow-1 cm in length   Tendonitis       5/18/2023   [facility] EMERGENCY DEPT     Stephen Emanuel MD  05/18/23 6540

## 2023-05-18 NOTE — ED TRIAGE NOTES
Patient presents with concerns of L arm pain that started partway through her work shift. States her LUE is numb and tingly starting partway up the forearm and down to the fingers (thumb, index and middle finger). Also states it is swollen.     Triage Assessment     Row Name 05/18/23 0030       Triage Assessment (Adult)    Airway WDL WDL       Respiratory WDL    Respiratory WDL WDL       Skin Circulation/Temperature WDL    Skin Circulation/Temperature WDL WDL       Cardiac WDL    Cardiac WDL WDL       Peripheral/Neurovascular WDL    Peripheral Neurovascular WDL X;neurovascular assessment upper       LUE Neurovascular Assessment    Temperature LUE warm    Color LUE no discoloration    Sensation LUE numbness present;tingling present       RUE Neurovascular Assessment    Temperature RUE warm    Color RUE no discoloration    Sensation RUE no numbness;no tenderness;no tingling       Cognitive/Neuro/Behavioral WDL    Cognitive/Neuro/Behavioral WDL WDL

## 2023-05-18 NOTE — DISCHARGE INSTRUCTIONS
Continue your current medications.  You can use Tylenol/ibuprofen as needed.  You have a very short segment of superficial thrombophlebitis right at the elbow.  There is no clot in the deep veins which are the ones we worry about.  Warm packs may be helpful.    You can wear a sling for comfort to help rest your arm as you may have a bit of tendinitis as well.  You should still take it out of the sling several times a day to work on range of motion.  Try not to overdo it.  Recheck in clinic if persistent problems.  It was nice visiting with you tonight.  I hope this settles down quickly for you.    Thank you for choosing [facility]. We appreciate the opportunity to meet your urgent medical needs. Please let us know if we could have done anything to make your stay more satisfying.    After discharge, please closely monitor for any new or worsening symptoms. Return to the Emergency Department if you develop any acute worsening signs or symptoms.    If you had lab work, cultures or imaging studies done during your stay, the final results may still be pending. We will call you if your plan of care needs to change. However, if you are not improving as expected, please follow up with your primary care provider or clinic.     Start any prescription medications that were prescribed to you and take them as directed.     Please see additional handouts that may be pertinent to your condition.

## 2023-05-25 DIAGNOSIS — G43.909 MIGRAINE WITHOUT STATUS MIGRAINOSUS, NOT INTRACTABLE, UNSPECIFIED MIGRAINE TYPE: ICD-10-CM

## 2023-05-26 RX ORDER — SUMATRIPTAN 25 MG/1
TABLET, FILM COATED ORAL
Qty: 18 TABLET | Refills: 0 | Status: SHIPPED | OUTPATIENT
Start: 2023-05-26 | End: 2023-06-23

## 2023-05-31 DIAGNOSIS — E11.9 TYPE 2 DIABETES MELLITUS WITHOUT COMPLICATION, WITHOUT LONG-TERM CURRENT USE OF INSULIN (H): ICD-10-CM

## 2023-05-31 DIAGNOSIS — M62.830 SPASM OF BACK MUSCLES: ICD-10-CM

## 2023-05-31 NOTE — TELEPHONE ENCOUNTER
Pending Prescriptions:                       Disp   Refills    methocarbamol (ROBAXIN) 500 MG tablet [Pha*120 ta*0        Sig: Take 1 tablet (500 mg) by mouth 4 times daily as           needed for muscle spasms    Routing refill request to provider for review/approval because:  Drug not on the [facility] refill protocol

## 2023-06-01 ENCOUNTER — APPOINTMENT (OUTPATIENT)
Dept: CT IMAGING | Facility: CLINIC | Age: 48
End: 2023-06-01
Attending: FAMILY MEDICINE
Payer: COMMERCIAL

## 2023-06-01 ENCOUNTER — HOSPITAL ENCOUNTER (EMERGENCY)
Facility: CLINIC | Age: 48
Discharge: HOME | End: 2023-06-01
Attending: FAMILY MEDICINE | Admitting: FAMILY MEDICINE
Payer: COMMERCIAL

## 2023-06-01 VITALS
OXYGEN SATURATION: 93 % | SYSTOLIC BLOOD PRESSURE: 115 MMHG | HEART RATE: 70 BPM | RESPIRATION RATE: 11 BRPM | DIASTOLIC BLOOD PRESSURE: 55 MMHG | WEIGHT: 240 LBS | BODY MASS INDEX: 48.47 KG/M2

## 2023-06-01 DIAGNOSIS — R20.2 PARESTHESIA OF LEFT ARM: ICD-10-CM

## 2023-06-01 DIAGNOSIS — R51.9 ACUTE NONINTRACTABLE HEADACHE, UNSPECIFIED HEADACHE TYPE: ICD-10-CM

## 2023-06-01 LAB
ANION GAP SERPL CALCULATED.3IONS-SCNC: 11 MMOL/L (ref 7–15)
APTT PPP: 26 SECONDS (ref 22–38)
BASOPHILS # BLD AUTO: 0.1 10E3/UL (ref 0–0.2)
BASOPHILS NFR BLD AUTO: 1 %
BUN SERPL-MCNC: 16.8 MG/DL (ref 6–20)
CALCIUM SERPL-MCNC: 8.8 MG/DL (ref 8.6–10)
CHLORIDE SERPL-SCNC: 107 MMOL/L (ref 98–107)
CREAT SERPL-MCNC: 1.14 MG/DL (ref 0.51–0.95)
CRP SERPL-MCNC: 4.51 MG/L
D DIMER PPP FEU-MCNC: 0.68 UG/ML FEU (ref 0–0.5)
DEPRECATED HCO3 PLAS-SCNC: 22 MMOL/L (ref 22–29)
EOSINOPHIL # BLD AUTO: 0.4 10E3/UL (ref 0–0.7)
EOSINOPHIL NFR BLD AUTO: 5 %
ERYTHROCYTE [DISTWIDTH] IN BLOOD BY AUTOMATED COUNT: 13.2 % (ref 10–15)
GFR SERPL CREATININE-BSD FRML MDRD: 59 ML/MIN/1.73M2
GLUCOSE BLDC GLUCOMTR-MCNC: 85 MG/DL (ref 70–99)
GLUCOSE SERPL-MCNC: 91 MG/DL (ref 70–99)
HCT VFR BLD AUTO: 40.8 % (ref 35–47)
HGB BLD-MCNC: 12.9 G/DL (ref 11.7–15.7)
IMM GRANULOCYTES # BLD: 0 10E3/UL
IMM GRANULOCYTES NFR BLD: 0 %
INR PPP: 1 (ref 0.85–1.15)
LYMPHOCYTES # BLD AUTO: 3 10E3/UL (ref 0.8–5.3)
LYMPHOCYTES NFR BLD AUTO: 34 %
MCH RBC QN AUTO: 29 PG (ref 26.5–33)
MCHC RBC AUTO-ENTMCNC: 31.6 G/DL (ref 31.5–36.5)
MCV RBC AUTO: 92 FL (ref 78–100)
MONOCYTES # BLD AUTO: 0.6 10E3/UL (ref 0–1.3)
MONOCYTES NFR BLD AUTO: 6 %
NEUTROPHILS # BLD AUTO: 4.9 10E3/UL (ref 1.6–8.3)
NEUTROPHILS NFR BLD AUTO: 54 %
NRBC # BLD AUTO: 0 10E3/UL
NRBC BLD AUTO-RTO: 0 /100
PLATELET # BLD AUTO: 270 10E3/UL (ref 150–450)
POTASSIUM SERPL-SCNC: 3.9 MMOL/L (ref 3.4–5.3)
RBC # BLD AUTO: 4.45 10E6/UL (ref 3.8–5.2)
SODIUM SERPL-SCNC: 140 MMOL/L (ref 136–145)
TROPONIN T SERPL HS-MCNC: 9 NG/L
WBC # BLD AUTO: 9 10E3/UL (ref 4–11)

## 2023-06-01 PROCEDURE — 85025 COMPLETE CBC W/AUTO DIFF WBC: CPT | Performed by: FAMILY MEDICINE

## 2023-06-01 PROCEDURE — 84484 ASSAY OF TROPONIN QUANT: CPT | Performed by: FAMILY MEDICINE

## 2023-06-01 PROCEDURE — 86140 C-REACTIVE PROTEIN: CPT | Performed by: FAMILY MEDICINE

## 2023-06-01 PROCEDURE — 82962 GLUCOSE BLOOD TEST: CPT

## 2023-06-01 PROCEDURE — 85379 FIBRIN DEGRADATION QUANT: CPT | Performed by: FAMILY MEDICINE

## 2023-06-01 PROCEDURE — 93010 ELECTROCARDIOGRAM REPORT: CPT | Performed by: FAMILY MEDICINE

## 2023-06-01 PROCEDURE — 36415 COLL VENOUS BLD VENIPUNCTURE: CPT | Performed by: FAMILY MEDICINE

## 2023-06-01 PROCEDURE — 93005 ELECTROCARDIOGRAM TRACING: CPT

## 2023-06-01 PROCEDURE — 250N000009 HC RX 250: Performed by: FAMILY MEDICINE

## 2023-06-01 PROCEDURE — 85730 THROMBOPLASTIN TIME PARTIAL: CPT | Performed by: FAMILY MEDICINE

## 2023-06-01 PROCEDURE — 70496 CT ANGIOGRAPHY HEAD: CPT

## 2023-06-01 PROCEDURE — 96375 TX/PRO/DX INJ NEW DRUG ADDON: CPT

## 2023-06-01 PROCEDURE — 70498 CT ANGIOGRAPHY NECK: CPT

## 2023-06-01 PROCEDURE — 250N000011 HC RX IP 250 OP 636: Performed by: FAMILY MEDICINE

## 2023-06-01 PROCEDURE — 85610 PROTHROMBIN TIME: CPT | Performed by: FAMILY MEDICINE

## 2023-06-01 PROCEDURE — 96374 THER/PROPH/DIAG INJ IV PUSH: CPT | Mod: 59

## 2023-06-01 PROCEDURE — 80048 BASIC METABOLIC PNL TOTAL CA: CPT | Performed by: FAMILY MEDICINE

## 2023-06-01 PROCEDURE — 99284 EMERGENCY DEPT VISIT MOD MDM: CPT | Mod: 25 | Performed by: FAMILY MEDICINE

## 2023-06-01 PROCEDURE — 99285 EMERGENCY DEPT VISIT HI MDM: CPT | Mod: 25

## 2023-06-01 PROCEDURE — 70450 CT HEAD/BRAIN W/O DYE: CPT

## 2023-06-01 RX ORDER — HYDROCODONE BITARTRATE AND ACETAMINOPHEN 5; 325 MG/1; MG/1
TABLET ORAL
COMMUNITY
Start: 2023-05-25

## 2023-06-01 RX ORDER — SIMVASTATIN 5 MG
5 TABLET ORAL AT BEDTIME
Qty: 90 TABLET | Refills: 0 | Status: SHIPPED | OUTPATIENT
Start: 2023-06-01 | End: 2023-08-14

## 2023-06-01 RX ORDER — DIPHENHYDRAMINE HYDROCHLORIDE 50 MG/ML
25 INJECTION INTRAMUSCULAR; INTRAVENOUS ONCE
Status: COMPLETED | OUTPATIENT
Start: 2023-06-01 | End: 2023-06-01

## 2023-06-01 RX ORDER — HYDROCODONE BITARTRATE AND ACETAMINOPHEN 5; 325 MG/1; MG/1
TABLET ORAL
COMMUNITY
Start: 2023-05-25 | End: 2023-06-29

## 2023-06-01 RX ORDER — IOPAMIDOL 755 MG/ML
500 INJECTION, SOLUTION INTRAVASCULAR ONCE
Status: COMPLETED | OUTPATIENT
Start: 2023-06-01 | End: 2023-06-01

## 2023-06-01 RX ORDER — METOCLOPRAMIDE HYDROCHLORIDE 5 MG/ML
7.5 INJECTION INTRAMUSCULAR; INTRAVENOUS ONCE
Status: COMPLETED | OUTPATIENT
Start: 2023-06-01 | End: 2023-06-01

## 2023-06-01 RX ADMIN — DIPHENHYDRAMINE HYDROCHLORIDE 25 MG: 50 INJECTION, SOLUTION INTRAMUSCULAR; INTRAVENOUS at 01:14

## 2023-06-01 RX ADMIN — METOCLOPRAMIDE HYDROCHLORIDE 7.5 MG: 5 INJECTION INTRAMUSCULAR; INTRAVENOUS at 01:12

## 2023-06-01 RX ADMIN — IOPAMIDOL 80 ML: 755 INJECTION, SOLUTION INTRAVENOUS at 01:40

## 2023-06-01 RX ADMIN — SODIUM CHLORIDE 100 ML: 9 INJECTION, SOLUTION INTRAVENOUS at 01:40

## 2023-06-01 ASSESSMENT — ENCOUNTER SYMPTOMS
GASTROINTESTINAL NEGATIVE: 1
CARDIOVASCULAR NEGATIVE: 1
EYES NEGATIVE: 1
MUSCULOSKELETAL NEGATIVE: 1
PSYCHIATRIC NEGATIVE: 1
FEVER: 0
NUMBNESS: 1
RESPIRATORY NEGATIVE: 1
HEADACHES: 1
SHORTNESS OF BREATH: 0
CHILLS: 0
APPETITE CHANGE: 0
WEAKNESS: 0

## 2023-06-01 ASSESSMENT — ACTIVITIES OF DAILY LIVING (ADL)
ADLS_ACUITY_SCORE: 35
ADLS_ACUITY_SCORE: 35

## 2023-06-01 NOTE — DISCHARGE INSTRUCTIONS
Please read and follow the handout(s) instructions. Return, if needed, for increased or worsening symptoms and as directed by the handout(s).    Wear the splint as needed for the numbness sensation in the left arm and hand.  Especially use the splint at night to prevent any bending of the wrist as this can make the numbness sensation worse.

## 2023-06-02 RX ORDER — METHOCARBAMOL 500 MG/1
500 TABLET, FILM COATED ORAL 4 TIMES DAILY PRN
Qty: 120 TABLET | Refills: 0 | Status: SHIPPED | OUTPATIENT
Start: 2023-06-02 | End: 2023-07-03

## 2023-06-02 NOTE — ED PROVIDER NOTES
History     Chief Complaint   Patient presents with     Headache     Arm Pain     HPI  Brittney Moon is a 47 year old female who presented to the emergency room today with concerns of onset of headache consistent with prior migraine headaches but associated with left arm tingling and numbness that starts in her left elbow extending distally into her fingertips.  Involves all of her fingers in that left hand.  Symptoms have been present for couple weeks but became worse about 3:00 this afternoon.  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  Patient states that she was diagnosed with a blood clot in her left arm 2 weeks ago with some swelling in the left arm and states that the swelling has not resolved.  She is concerned about the possibility the clot causing her symptoms in the left arm tonight.  Patient states that she is a diabetic and continues on metformin therapy.  She states that she has lost 120 pounds in her attempts to control her diabetes.      I reviewed a recent upper extremity venous Doppler study and copied the radiology report below:  Study Result    Narrative & Impression   EXAM: US UPPER EXTREMITY VENOUS DUPLEX LEFT  LOCATION: [facility]  DATE/TIME: 5/18/2023 4:23 AM CDT     INDICATION: arm pain swelling, elevated d dimer  COMPARISON: None.  TECHNIQUE: Venous Duplex ultrasound of the left upper extremity with (when possible) and without compression, augmentation, and duplex. Color flow and spectral Doppler with waveform analysis performed.     FINDINGS: Ultrasound includes evaluation of the internal jugular vein, innominate vein, subclavian vein, axillary vein, and brachial vein. The superficial cephalic and basilic veins were also evaluated where seen.       LEFT: No deep venous thrombosis. Superficial thrombophlebitis left cephalic vein at elbow, 1 x 0.2 cm in size.                                                                      IMPRESSION:   1.  Superficial thrombophlebitis left cephalic vein at elbow.  2.  No deep venous thrombosis in the left upper extremity.       I reviewed a recent D-dimer test result in the [facility] EMR and copied it below:  Component      Latest Ref Rng 5/18/2023  1:09 AM   D-Dimer Quantitative      0.00 - 0.50 ug/mL FEU 0.63 (H)       Legend:  (H) High    Allergies:  Allergies   Allergen Reactions     Food Anaphylaxis     cilantro     Coriandrum Sativum Rash       Problem List:    Patient Active Problem List    Diagnosis Date Noted     Complex regional pain syndrome i of left lower limb 07/29/2022     Priority: Medium     Opioid dependence (H) 07/29/2022     Priority: Medium     Chronic low back pain 08/23/2021     Priority: Medium     Inability to bear weight 02/11/2021     Priority: Medium     Fall at home, initial encounter 01/06/2020     Priority: Medium     Left hip pain 01/06/2020     Priority: Medium     Ganglion cyst 11/19/2018     Priority: Medium     Type 2 diabetes mellitus without complication, without long-term current use of insulin (H) 10/21/2018     Priority: Medium     Morbid obesity (H) 10/21/2018     Priority: Medium     Amputation of leg (H) 11/17/2017     Priority: Medium     Formatting of this note might be different from the original.  Overview:   15 surgeries; following club foot repair and osteomyelitis as childe       History of right knee joint replacement 11/17/2017     Priority: Medium     Anxiety disorder due to medical condition 08/03/2010     Priority: Medium        Past Medical History:    Past Medical History:   Diagnosis Date     Other convulsions      Unspecified osteomyelitis, lower leg        Past Surgical History:    Past Surgical History:   Procedure Laterality Date     COLONOSCOPY N/A 10/18/2022     Procedure: COLONOSCOPY, WITH POLYPECTOMY;  Surgeon: Micah Reno MD;  Location: [facility]     EXCISE GANGLION WRIST Left 12/24/2018    Procedure: EXCISION LEFT WRIST GANGLION CYST;  Surgeon: Kehinde Pino DO;  Location: [facility]     INJECT FACET JOINT Bilateral 11/29/2019    Procedure: lumbar sacral facet injection bilateral 4-5 sacral 1;  Surgeon: Canelo Zamora MD;  Location: [facility]     ZZC AMPUTATION LOW LEG THRU TIB/FIB      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    Social History:  Marital Status:  Single [1]  Social History     Tobacco Use     Smoking status: Never     Smokeless tobacco: Never   Vaping Use     Vaping status: Never Used     Passive vaping exposure: Yes   Substance Use Topics     Alcohol use: Yes     Comment: rare     Drug use: No        Medications:    HYDROcodone-acetaminophen (NORCO) 5-325 MG tablet  simvastatin (ZOCOR) 5 MG tablet  cyanocobalamin (VITAMIN B-12) 1000 MCG tablet  FLUoxetine (PROZAC) 40 MG capsule  HYDROcodone-acetaminophen (NORCO) 5-325 MG tablet  ketorolac (TORADOL) 10 MG tablet  metFORMIN (GLUCOPHAGE XR) 500 MG 24 hr tablet  methocarbamol (ROBAXIN) 500 MG tablet  SUMAtriptan (IMITREX) 25 MG tablet  topiramate (TOPAMAX) 100 MG tablet  topiramate (TOPAMAX) 50 MG tablet  traZODone (DESYREL) 100 MG tablet  Vitamin D3 (CHOLECALCIFEROL) 25 mcg (1000 units) tablet          Review of Systems   Constitutional: Negative for appetite change, chills and fever.   HENT: Negative.    Eyes: Negative.    Respiratory: Negative.  Negative for shortness of breath.    Cardiovascular: Negative.         Patient states that she continues to notice some swelling in her left arm and hand versus the right unchanged from 2 weeks ago when she was diagnosed with a superficial clot in her left arm.   Gastrointestinal: Negative.    Genitourinary: Negative.    Musculoskeletal: Negative.    Skin: Negative.    Neurological: Positive for  numbness (Left lower arm and hand.  Describes it as a tingling sensation.) and headaches. Negative for weakness (She denies any muscle weakness into the left hand or arm.).   Psychiatric/Behavioral: Negative.    All other systems reviewed and are negative.      Physical Exam   BP: (!) 140/64  Pulse: 60  Resp: 14  Weight: 108.9 kg (240 lb)  SpO2: 96 %      Physical Exam  Vitals and nursing note reviewed.   Constitutional:       General: She is in acute distress (headache).      Appearance: She is not ill-appearing, toxic-appearing or diaphoretic.   HENT:      Head: Normocephalic and atraumatic.   Eyes:      Extraocular Movements: Extraocular movements intact.      Conjunctiva/sclera: Conjunctivae normal.      Pupils: Pupils are equal, round, and reactive to light.   Cardiovascular:      Rate and Rhythm: Normal rate.      Pulses: Normal pulses.   Pulmonary:      Effort: Pulmonary effort is normal. No respiratory distress.   Musculoskeletal:         General: No deformity or signs of injury.      Cervical back: Normal range of motion and neck supple.      Comments: Patient without significant swelling in the left arm versus the right.  No significant discoloration noted to the parents of the left arm versus right.  She has good  strength on the left side versus right.   Skin:     Capillary Refill: Capillary refill takes less than 2 seconds.      Findings: No rash.   Neurological:      Mental Status: She is alert and oriented to person, place, and time.      Sensory: Sensory deficit (Patient reports decreased touch sensation in the left hand.) present.      Motor: No weakness.   Psychiatric:         Behavior: Behavior normal.         ED Course                 Procedures              EKG Interpretation:      Interpreted by Chance Palencia DO  Time reviewed: 01:08  Symptoms at time of EKG: Headache, left arm pain and numbness  Rhythm: normal sinus   Rate: normal  Axis: normal  Ectopy: none  Conduction:  normal  ST Segments/ T Waves: No ST-T wave changes  Q Waves: none      Clinical Impression: normal EKG        Critical Care time:  none               Results for orders placed or performed during the hospital encounter of 06/01/23 (from the past 24 hour(s))   D dimer quantitative   Result Value Ref Range    D-Dimer Quantitative 0.68 (H) 0.00 - 0.50 ug/mL FEU    Narrative    This D-dimer assay is intended for use in conjunction with a clinical pretest probability assessment model to exclude pulmonary embolism (PE) and deep venous thrombosis (DVT) in outpatients suspected of PE or DVT. The cut-off value is 0.50 ug/mL FEU.   Troponin T, High Sensitivity   Result Value Ref Range    Troponin T, High Sensitivity 9 <=14 ng/L   Basic metabolic panel   Result Value Ref Range    Sodium 140 136 - 145 mmol/L    Potassium 3.9 3.4 - 5.3 mmol/L    Chloride 107 98 - 107 mmol/L    Carbon Dioxide (CO2) 22 22 - 29 mmol/L    Anion Gap 11 7 - 15 mmol/L    Urea Nitrogen 16.8 6.0 - 20.0 mg/dL    Creatinine 1.14 (H) 0.51 - 0.95 mg/dL    Calcium 8.8 8.6 - 10.0 mg/dL    Glucose 91 70 - 99 mg/dL    GFR Estimate 59 (L) >60 mL/min/1.73m2   CBC with platelets differential    Narrative    The following orders were created for panel order CBC with platelets differential.  Procedure                               Abnormality         Status                     ---------                               -----------         ------                     CBC with platelets and d...[[id number]]                      Final result                 Please view results for these tests on the individual orders.   CRP inflammation   Result Value Ref Range    CRP Inflammation 4.51 <5.00 mg/L   INR   Result Value Ref Range    INR 1.00 0.85 - 1.15   Partial thromboplastin time   Result Value Ref Range    aPTT 26 22 - 38 Seconds   CBC with platelets and differential   Result Value Ref Range    WBC Count 9.0 4.0 - 11.0 10e3/uL    RBC Count 4.45 3.80 - 5.20 10e6/uL     Hemoglobin 12.9 11.7 - 15.7 g/dL    Hematocrit 40.8 35.0 - 47.0 %    MCV 92 78 - 100 fL    MCH 29.0 26.5 - 33.0 pg    MCHC 31.6 31.5 - 36.5 g/dL    RDW 13.2 10.0 - 15.0 %    Platelet Count 270 150 - 450 10e3/uL    % Neutrophils 54 %    % Lymphocytes 34 %    % Monocytes 6 %    % Eosinophils 5 %    % Basophils 1 %    % Immature Granulocytes 0 %    NRBCs per 100 WBC 0 <1 /100    Absolute Neutrophils 4.9 1.6 - 8.3 10e3/uL    Absolute Lymphocytes 3.0 0.8 - 5.3 10e3/uL    Absolute Monocytes 0.6 0.0 - 1.3 10e3/uL    Absolute Eosinophils 0.4 0.0 - 0.7 10e3/uL    Absolute Basophils 0.1 0.0 - 0.2 10e3/uL    Absolute Immature Granulocytes 0.0 <=0.4 10e3/uL    Absolute NRBCs 0.0 10e3/uL   Glucose by meter   Result Value Ref Range    GLUCOSE BY METER POCT 85 70 - 99 mg/dL   CT Head w/o Contrast    Narrative    EXAM: CT HEAD W/O CONTRAST, CTA HEAD NECK W CONTRAST  LOCATION: [facility]  DATE/TIME: 6/1/2023 1:56 AM CDT    INDICATION: Left arm numbness, headache  COMPARISON: None.  CONTRAST: Isovue 370, 80mL  TECHNIQUE: Head and neck CT angiogram with IV contrast. Noncontrast head CT followed by axial helical CT images of the head and neck vessels obtained during the arterial phase of intravenous contrast administration. Axial 2D reconstructed images and   multiplanar 3D MIP reconstructed images of the head and neck vessels were performed by the technologist. Dose reduction techniques were used. All stenosis measurements made according to NASCET criteria unless otherwise specified.    FINDINGS:   NONCONTRAST HEAD CT:   INTRACRANIAL CONTENTS: No intracranial hemorrhage, extraaxial collection, or mass effect.  No CT evidence of acute infarct. Normal parenchymal attenuation. Normal ventricles and sulci.     VISUALIZED ORBITS/SINUSES/MASTOIDS: No intraorbital abnormality. No paranasal sinus mucosal disease. No middle ear or mastoid effusion.    BONES/SOFT TISSUES: No acute abnormality.    HEAD  CTA:  ANTERIOR CIRCULATION: No stenosis/occlusion, aneurysm, or high flow vascular malformation. 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 Fetal origin of the bilateral posterior cerebral arteries.    POSTERIOR CIRCULATION: No stenosis/occlusion, aneurysm, or high flow vascular malformation. Congenitally small vertebrobasilar system.     DURAL VENOUS SINUSES: Expected enhancement of the major dural venous sinuses.    NECK CTA:  RIGHT CAROTID: No measurable stenosis or dissection.    LEFT CAROTID: No measurable stenosis or dissection.    VERTEBRAL ARTERIES: No focal stenosis or dissection. Balanced vertebral arteries.    AORTIC ARCH: Classic aortic arch anatomy with no significant stenosis at the origin of the great vessels.    NONVASCULAR STRUCTURES: Visualized portions of the lungs are clear. Mild degenerative changes of the cervical spine, without high-grade spinal canal or neural foraminal narrowing.      Impression    IMPRESSION:   HEAD CT:  1.  No acute intracranial process.    HEAD CTA:   1.  No significant stenosis, aneurysm, or high flow vascular malformation identified.  2.  Variant [place] anatomy as above.    NECK CTA:  1.  No hemodynamically significant stenosis in the neck vessels.   2.  No evidence for dissection.   CTA Head Neck with Contrast    Narrative    EXAM: CT HEAD W/O CONTRAST, CTA HEAD NECK W CONTRAST  LOCATION: [facility]  DATE/TIME: 6/1/2023 1:56 AM CDT    INDICATION: Left arm numbness, headache  COMPARISON: None.  CONTRAST: Isovue 370, 80mL  TECHNIQUE: Head and neck CT angiogram with IV contrast. Noncontrast head CT followed by axial helical CT images of the head and neck vessels obtained during the arterial phase of intravenous contrast administration. Axial 2D reconstructed images and   multiplanar 3D MIP reconstructed images of the head and neck vessels were performed by the technologist. Dose reduction techniques were  used. All stenosis measurements made according to NASCET criteria unless otherwise specified.    FINDINGS:   NONCONTRAST HEAD CT:   INTRACRANIAL CONTENTS: No intracranial hemorrhage, extraaxial collection, or mass effect.  No CT evidence of acute infarct. Normal parenchymal attenuation. Normal ventricles and sulci.     VISUALIZED ORBITS/SINUSES/MASTOIDS: No intraorbital abnormality. No paranasal sinus mucosal disease. No middle ear or mastoid effusion.    BONES/SOFT TISSUES: No acute abnormality.    HEAD CTA:  ANTERIOR CIRCULATION: No stenosis/occlusion, aneurysm, or high flow vascular malformation. 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 Fetal origin of the bilateral posterior cerebral arteries.    POSTERIOR CIRCULATION: No stenosis/occlusion, aneurysm, or high flow vascular malformation. Congenitally small vertebrobasilar system.     DURAL VENOUS SINUSES: Expected enhancement of the major dural venous sinuses.    NECK CTA:  RIGHT CAROTID: No measurable stenosis or dissection.    LEFT CAROTID: No measurable stenosis or dissection.    VERTEBRAL ARTERIES: No focal stenosis or dissection. Balanced vertebral arteries.    AORTIC ARCH: Classic aortic arch anatomy with no significant stenosis at the origin of the great vessels.    NONVASCULAR STRUCTURES: Visualized portions of the lungs are clear. Mild degenerative changes of the cervical spine, without high-grade spinal canal or neural foraminal narrowing.      Impression    IMPRESSION:   HEAD CT:  1.  No acute intracranial process.    HEAD CTA:   1.  No significant stenosis, aneurysm, or high flow vascular malformation identified.  2.  Variant [place] anatomy as above.    NECK CTA:  1.  No hemodynamically significant stenosis in the neck vessels.   2.  No evidence for dissection.       Medications   iopamidol (ISOVUE-370) solution 500 mL (80 mLs Intravenous $Given 6/1/23 0140)   sodium chloride 0.9 % bag 100mL for CT scan flush use  (100 mLs Intravenous $Given 6/1/23 0140)   metoclopramide (REGLAN) injection 7.5 mg (7.5 mg Intravenous $Given 6/1/23 0112)   diphenhydrAMINE (BENADRYL) injection 25 mg (25 mg Intravenous $Given 6/1/23 0114)       Assessments & Plan (with Medical Decision Making)  47-year-old female to the ER secondary to concerns of onset of headache associated with left arm tingling and decreased touch sensation in the hand of the left arm.  Patient with long history for migraine headaches.  Patient also has some increased swelling in her left arm associated with a superficial thrombophlebitis that was diagnosed 2 weeks ago.  Patient with a fairly extensive work-up for evaluation for possible ACS as a reason for left arm symptoms versus migraine versus acute stroke.  Imaging and lab findings findings are unremarkable for abnormality.  D-dimer not significantly changed from when she had done 2 weeks ago.  Left arm swelling not significantly changed from prior visit 2 weeks ago.  We did discuss possibility of carpal tunnel syndrome as reason for symptoms.  We did place her in a left wrist splint and the possibility that carpal tunnel could be involved.  Patient was treated for her headache with Reglan.  She slept for several hours.  She had a stable ER course.  We will plan to discharged home.  Encourage follow-up in the clinic for recheck.  To return to the ER for any increase or worsening symptoms.  She was given instructions in regards to use of the left wrist splint.  Also given information regarding paresthesias and carpal tunnel syndrome.     I have reviewed the nursing notes.    I have reviewed the findings, diagnosis, plan and need for follow up with the patient.           Medical Decision Making  The patient's presentation was of moderate complexity (an acute illness with systemic symptoms).    The patient's evaluation involved:  ordering and/or review of 3+ test(s) in this encounter (see separate area of note for  details)  review of 2 test result(s) ordered prior to this encounter (see separate area of note for details)    The patient's management necessitated only low risk treatment.               I verbally discussed the findings of the evaluation today in the ER. I have verbally discussed with Brittney the suggested treatment(s) as described in the discharge instructions and handouts.     I have verbally suggested she follow-up in her clinic or return to the ER for increased symptoms. See the follow-up recommendations documented  in the after visit summary in this visit's EPIC chart.      Disclaimer: This note consists of words and symbols derived from keyboarding and dictation using voice recognition software.  As a result, there may be errors that have gone undetected.  Please consider this when interpreting information found in this note.      Final diagnoses:   Paresthesia of left arm   Acute nonintractable headache, unspecified headache type       6/1/2023   [facility] EMERGENCY DEPT     Chance Palencia,   06/01/23 1946

## 2023-06-17 ENCOUNTER — APPOINTMENT (OUTPATIENT)
Dept: ULTRASOUND IMAGING | Facility: CLINIC | Age: 48
End: 2023-06-17
Attending: FAMILY MEDICINE
Payer: COMMERCIAL

## 2023-06-17 ENCOUNTER — HOSPITAL ENCOUNTER (EMERGENCY)
Facility: CLINIC | Age: 48
Discharge: HOME | End: 2023-06-17
Attending: FAMILY MEDICINE | Admitting: FAMILY MEDICINE
Payer: COMMERCIAL

## 2023-06-17 VITALS
HEART RATE: 78 BPM | RESPIRATION RATE: 18 BRPM | WEIGHT: 240 LBS | DIASTOLIC BLOOD PRESSURE: 79 MMHG | BODY MASS INDEX: 48.47 KG/M2 | SYSTOLIC BLOOD PRESSURE: 128 MMHG | OXYGEN SATURATION: 97 %

## 2023-06-17 DIAGNOSIS — M79.622 PAIN OF LEFT UPPER ARM: ICD-10-CM

## 2023-06-17 PROCEDURE — 93971 EXTREMITY STUDY: CPT | Mod: LT

## 2023-06-17 PROCEDURE — 99283 EMERGENCY DEPT VISIT LOW MDM: CPT | Performed by: FAMILY MEDICINE

## 2023-06-17 PROCEDURE — 99284 EMERGENCY DEPT VISIT MOD MDM: CPT | Mod: 25

## 2023-06-17 ASSESSMENT — ACTIVITIES OF DAILY LIVING (ADL): ADLS_ACUITY_SCORE: 35

## 2023-06-18 ASSESSMENT — ENCOUNTER SYMPTOMS
SHORTNESS OF BREATH: 0
CHEST TIGHTNESS: 0
FEVER: 0

## 2023-06-18 NOTE — ED PROVIDER NOTES
History     Chief Complaint   Patient presents with     Arm Pain     HPI  Brittney Moon is a 47 year old female who presents to the emergency room today secondary concerns of desiring a D-dimer test be done to evaluate for worsening clot in her left arm.  Patient states that she was diagnosed with a superficial thrombophlebitis in her left arm in May.  She has had a repeat D-dimer since in early June and now she is having continued and increased pain in that arm and so feels that she needs another D-dimer done to evaluate for the clot.  Patient describes tenderness and achiness made worse with movement of the arm to the left forearm and wrist region.  Patient states that she has had a prior wrist surgery associated with cyst removal from the wrist area.  Patient is wheelchair-bound secondary to amputation of her leg associated with diabetes.  Patient is not on any anticoagulation therapy at present.  She has not noticed fever or redness to the skin of the arm.  She denies any injury to the ER.  She denies any chest pain or shortness of breath symptoms.  Patient states that her pain symptoms do worsen at night.    Allergies:  Allergies   Allergen Reactions     Food Anaphylaxis     cilantro     Coriandrum Sativum Rash       Problem List:    Patient Active Problem List    Diagnosis Date Noted     Complex regional pain syndrome i of left lower limb 07/29/2022     Priority: Medium     Opioid dependence (H) 07/29/2022     Priority: Medium     Chronic low back pain 08/23/2021     Priority: Medium     Inability to bear weight 02/11/2021     Priority: Medium     Fall at home, initial encounter 01/06/2020     Priority: Medium     Left hip pain 01/06/2020     Priority: Medium     Ganglion cyst 11/19/2018     Priority: Medium     Type 2 diabetes mellitus without complication, without long-term current use of insulin (H) 10/21/2018     Priority: Medium     Morbid obesity (H) 10/21/2018     Priority: Medium     Amputation of  leg (H) 11/17/2017     Priority: Medium     Formatting of this note might be different from the original.  Overview:   15 surgeries; following club foot repair and osteomyelitis as childe       History of right knee joint replacement 11/17/2017     Priority: Medium     Anxiety disorder due to medical condition 08/03/2010     Priority: Medium        Past Medical History:    Past Medical History:   Diagnosis Date     Other convulsions      Unspecified osteomyelitis, lower leg        Past Surgical History:    Past Surgical History:   Procedure Laterality Date     COLONOSCOPY N/A 10/18/2022    Procedure: COLONOSCOPY, WITH POLYPECTOMY;  Surgeon: Micah Reno MD;  Location: [facility]     EXCISE GANGLION WRIST Left 12/24/2018    Procedure: EXCISION LEFT WRIST GANGLION CYST;  Surgeon: Kehinde Pino DO;  Location: [facility]     INJECT FACET JOINT Bilateral 11/29/2019    Procedure: lumbar sacral facet injection bilateral 4-5 sacral 1;  Surgeon: Canelo Zamora MD;  Location: [facility]     ZZC AMPUTATION LOW LEG THRU TIB/FIB      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    Social History:  Marital Status:  Single [1]  Social History     Tobacco Use     Smoking status: Never     Smokeless tobacco: Never   Vaping Use     Vaping status: Never Used     Passive vaping exposure: Yes   Substance Use Topics     Alcohol use: Yes     Comment: rare     Drug use: No        Medications:    simvastatin (ZOCOR) 5 MG tablet  cyanocobalamin (VITAMIN B-12) 1000 MCG tablet  FLUoxetine (PROZAC) 40 MG capsule  HYDROcodone-acetaminophen (NORCO) 5-325 MG tablet  HYDROcodone-acetaminophen (NORCO) 5-325 MG tablet  ketorolac (TORADOL) 10 MG tablet  metFORMIN (GLUCOPHAGE XR) 500 MG 24 hr tablet  methocarbamol (ROBAXIN) 500 MG tablet  SUMAtriptan (IMITREX) 25 MG tablet  topiramate (TOPAMAX) 100 MG tablet  topiramate (TOPAMAX) 50 MG tablet  traZODone (DESYREL) 100 MG tablet  Vitamin D3  (CHOLECALCIFEROL) 25 mcg (1000 units) tablet          Review of Systems   Constitutional: Negative for fever.   Respiratory: Negative for chest tightness and shortness of breath.    Cardiovascular: Negative for chest pain.   Musculoskeletal:        Left arm tenderness and perceived swelling to the left forearm region.  Increased pain at night versus daytime.  Pain seems worse the last couple days after becoming some improved after the initial diagnosis of a superficial thrombophlebitis.   All other systems reviewed and are negative.      Physical Exam   BP: 132/79  Pulse: 78  Resp: 18  Weight: 108.9 kg (240 lb)  SpO2: 97 %      Physical Exam  Vitals and nursing note reviewed.   Musculoskeletal:      Left forearm: Tenderness present. No swelling, edema, deformity, lacerations or bony tenderness.        Arms:       Comments: No erythema or suggestion of trauma identified.  No bruising or ecchymosis noted.  Some increased tenderness to the area with range of motion and palpation.  No significant swelling as compared to the right arm noted on exam.  Healed scar noted to the anterior left wrist area consistent with her history of prior cyst removal.  Mildly positive Tinel's on exam.   Neurological:      Mental Status: She is alert.         ED Course                 Procedures              Critical Care time:  none               Results for orders placed or performed during the hospital encounter of 06/17/23 (from the past 24 hour(s))   US Upper Extremity Venous Duplex Left    Narrative    EXAM: US UPPER EXTREMITY VENOUS DUPLEX LEFT  LOCATION: [facility]  DATE: 6/17/2023    INDICATION: left arm pain and swelling, Hx of SVT concern for possible DVT  COMPARISON: Ultrasound upper extremity venous duplex left 05/18/2023  TECHNIQUE: Venous Duplex ultrasound of the left upper extremity with (when possible) and without compression, augmentation, and duplex. Color flow and spectral Doppler with  waveform analysis performed.    FINDINGS: Ultrasound includes evaluation of the internal jugular vein, innominate vein, subclavian vein, axillary vein, and brachial vein. The superficial cephalic and basilic veins were also evaluated where seen.     LEFT: No deep venous thrombosis. Previously noted superficial thrombosis in the left cephalic vein appears resolved.       Impression    IMPRESSION:   1.  No deep venous thrombosis in the left upper extremity.  2.  Previously noted superficial thrombosis in the left cephalic vein appears resolved.           Assessments & Plan (with Medical Decision Making)  Discussed with patient that the D-dimer test would not be of much help and evaluating for clot in the left arm elicits totally normal.  Given the previously abnormal D-dimers we elected to do a venous Doppler ultrasound of the arm instead.  The ultrasound proved negative for any evidence of clot both deep and superficial.  Patient was reassured by the findings.  Given the mildly positive Tinel's as well as increased pain at night I suspect that this may be associated with ulnar or median nerve irritation.  Patient plans to wear a wrist splint at night to prevent positioning of the wrist and elbow that might cause worsening symptoms.  She plans to follow-up as previously scheduled with her clinic physician on the 26 for recheck.  To return to the ER for increase or worsening symptoms if noted.     I have reviewed the nursing notes.    I have reviewed the findings, diagnosis, plan and need for follow up with the patient.           Medical Decision Making  The patient's presentation was of moderate complexity (a chronic illness mild to moderate exacerbation, progression, or side effect of treatment).    The patient's evaluation involved:  ordering and/or review of 1 test(s) in this encounter (see separate area of note for details)    The patient's management necessitated only low risk treatment.               I verbally  discussed the findings of the evaluation today in the ER. I have verbally discussed with Brittney the suggested treatment(s) as described in the discharge instructions and handouts.    I have verbally suggested she follow-up in her clinic or return to the ER for increased symptoms. See the follow-up recommendations documented  in the after visit summary in this visit's EPIC chart.      Disclaimer: This note consists of words and symbols derived from keyboarding and dictation using voice recognition software.  As a result, there may be errors that have gone undetected.  Please consider this when interpreting information found in this note.      Final diagnoses:   Pain of left upper arm       6/17/2023   [facility] EMERGENCY DEPT     Chance Palencia,   06/18/23 [id number]

## 2023-06-18 NOTE — DISCHARGE INSTRUCTIONS
The handouts are for your information to review.  There is no signs of any clot in the left arm on ultrasound today which is great news.  I recommend using the splint at night as this may help prevent irritation to the median nerve nerve which could be the reason for your pain in that arm.  Follow-up on the 26th as planned.

## 2023-06-23 DIAGNOSIS — G43.909 MIGRAINE WITHOUT STATUS MIGRAINOSUS, NOT INTRACTABLE, UNSPECIFIED MIGRAINE TYPE: ICD-10-CM

## 2023-06-23 RX ORDER — SUMATRIPTAN 25 MG/1
TABLET, FILM COATED ORAL
Qty: 18 TABLET | Refills: 0 | Status: SHIPPED | OUTPATIENT
Start: 2023-06-23 | End: 2023-07-19

## 2023-06-25 DIAGNOSIS — G43.909 MIGRAINE WITHOUT STATUS MIGRAINOSUS, NOT INTRACTABLE, UNSPECIFIED MIGRAINE TYPE: ICD-10-CM

## 2023-06-26 RX ORDER — SUMATRIPTAN 25 MG/1
TABLET, FILM COATED ORAL
Qty: 18 TABLET | Refills: 0 | OUTPATIENT
Start: 2023-06-26

## 2023-06-28 ENCOUNTER — LAB REQUISITION (OUTPATIENT)
Dept: LAB | Facility: CLINIC | Age: 48
End: 2023-06-28

## 2023-06-28 ENCOUNTER — APPOINTMENT (OUTPATIENT)
Dept: LAB | Facility: CLINIC | Age: 48
End: 2023-06-28
Payer: COMMERCIAL

## 2023-06-28 PROCEDURE — 36415 COLL VENOUS BLD VENIPUNCTURE: CPT | Performed by: INTERNAL MEDICINE

## 2023-06-28 PROCEDURE — 86787 VARICELLA-ZOSTER ANTIBODY: CPT | Performed by: INTERNAL MEDICINE

## 2023-06-29 ENCOUNTER — OFFICE VISIT (OUTPATIENT)
Dept: FAMILY MEDICINE | Facility: OTHER | Age: 48
End: 2023-06-29
Payer: COMMERCIAL

## 2023-06-29 VITALS
DIASTOLIC BLOOD PRESSURE: 74 MMHG | HEART RATE: 58 BPM | TEMPERATURE: 97.6 F | BODY MASS INDEX: 48.38 KG/M2 | OXYGEN SATURATION: 96 % | RESPIRATION RATE: 20 BRPM | HEIGHT: 59 IN | SYSTOLIC BLOOD PRESSURE: 116 MMHG | WEIGHT: 240 LBS

## 2023-06-29 DIAGNOSIS — M67.432 GANGLION CYST OF WRIST, LEFT: Primary | ICD-10-CM

## 2023-06-29 DIAGNOSIS — E11.9 TYPE 2 DIABETES MELLITUS WITHOUT COMPLICATION, WITHOUT LONG-TERM CURRENT USE OF INSULIN (H): ICD-10-CM

## 2023-06-29 DIAGNOSIS — F11.20 UNCOMPLICATED OPIOID DEPENDENCE (H): ICD-10-CM

## 2023-06-29 DIAGNOSIS — G62.9 NEUROPATHY: ICD-10-CM

## 2023-06-29 LAB
VZV IGG SER QL IA: 317 INDEX
VZV IGG SER QL IA: POSITIVE

## 2023-06-29 PROCEDURE — 99214 OFFICE O/P EST MOD 30 MIN: CPT | Performed by: PHYSICIAN ASSISTANT

## 2023-06-29 RX ORDER — ACETAMINOPHEN 500 MG
TABLET ORAL
COMMUNITY

## 2023-06-29 RX ORDER — GABAPENTIN 300 MG/1
CAPSULE ORAL
Qty: 90 CAPSULE | Refills: 3 | Status: SHIPPED | OUTPATIENT
Start: 2023-06-29 | End: 2023-10-10

## 2023-06-29 ASSESSMENT — PAIN SCALES - GENERAL: PAINLEVEL: SEVERE PAIN (7)

## 2023-06-29 NOTE — PROGRESS NOTES
Assessment & Plan     Ganglion cyst of wrist, left  Neuropathy  Patient was seen at the [facility] ED 3x over this past month. Review of the these visits shows that she was initially found to have superficial thrombophlebitis, but this had resolved by 06/17. The remainder of her workup returned unremarkable and her pains were suspected to be due to carpal tunnel given her frequent use of computers, etc. On exam there is a small mass, possibly adipose tissue, along the medial left wrist. Patient has a history of ganglion cyst formation over this wrist, s/p removal 2018. We discussed return to orthopedics to discuss whether they feel this is truly a cyst vs adipose tissue. If they feels that this is carpal tunnel they may decide to administer injection to the wrist. Patient will have gabapentin to use for pains. Reviewed possible adverse effects and advised her to reach out if not improving as expected.   - gabapentin (NEURONTIN) 300 MG capsule; Take 1 cap (300mg) at bed [id number], then may increase to 2 caps (600mg) at bed [id number], then may increase to 1 cap (300mg) in the AM and 2 caps (600mg) at bed  - Orthopedic  Referral; Future    Type 2 diabetes mellitus without complication, without long-term current use of insulin (H)  Last A1c in 04/2023 was excellent, 5.6%. The patient has been compliant with treatment plan. No changes at this time. Will avoid oral steroids in order to avoid disrupting the excellent control of blood glucose.     Uncomplicated opioid dependence (H)  Patient follows with interventional spine and pain physicians in [place]. Will defer to them for management.     Pelon Cotter PA-C  [facility]    Pritesh Gatica is a 47 year old, presenting for the following health issues:  Hospital F/U        6/29/2023    10:07 AM   Additional Questions   Roomed by Shabana   Accompanied by self         6/29/2023    10:07 AM   Patient Reported Additional Medications  "  Patient reports taking the following new medications NA     HPI     ED/UC Followup:    Facility:  [facility]  Date of visit: 6/17/23  Reason for visit: blood clot lt arm  Current Status: D dimer labs still elevated    Mother diagnosed with breast cancer  Stress and anxiety is elevated  Injured the right lateral lower leg on the ramp at her home  This was 1.5 months ago   Recalls that this was edematous,  today     Review of Systems   Constitutional, HEENT, cardiovascular, pulmonary, gi and gu systems are negative, except as otherwise noted.      Objective    /74   Pulse 58   Temp 97.6  F (36.4  C) (Temporal)   Resp 20   Ht 1.499 m (4' 11\")   Wt 108.9 kg (240 lb)   LMP  (LMP Unknown)   SpO2 96%   BMI 48.47 kg/m    Body mass index is 48.47 kg/m .  Physical Exam   GENERAL: healthy, alert and no distress  RESP: lungs clear to auscultation - no rales, rhonchi or wheezes  CV: regular rate and rhythm, normal S1 S2, no S3 or S4, no murmur, click or rub, no peripheral edema and peripheral pulses strong  MS: small, nontender mass, possible adipose tissue, along the volar surface of medial left wrist. Normal AROM of wrist.   Small, mildly tender, subcutaneous mass along the right lateral lower leg  PSYCH: mentation appears normal, affect normal/bright                "

## 2023-07-03 DIAGNOSIS — M62.830 SPASM OF BACK MUSCLES: ICD-10-CM

## 2023-07-03 RX ORDER — METHOCARBAMOL 500 MG/1
500 TABLET, FILM COATED ORAL 4 TIMES DAILY PRN
Qty: 120 TABLET | Refills: 0 | Status: SHIPPED | OUTPATIENT
Start: 2023-07-03 | End: 2023-08-07

## 2023-07-17 DIAGNOSIS — G43.909 MIGRAINE WITHOUT STATUS MIGRAINOSUS, NOT INTRACTABLE, UNSPECIFIED MIGRAINE TYPE: ICD-10-CM

## 2023-07-18 NOTE — TELEPHONE ENCOUNTER
"Pending Prescriptions:                       Disp   Refills    SUMAtriptan (IMITREX) 25 MG tablet [Pharma*18 tab*0        Sig: TAKE 1 TABLET BY MOUTH AT ONSET OF HEADACHE FOR           MIGRAINE    Routing refill request to provider for review/approval because:  Serotonine agonist failed    Requested Prescriptions   Pending Prescriptions Disp Refills    SUMAtriptan (IMITREX) 25 MG tablet [Pharmacy Med Name: SUMATRIPTAN SUCCINATE 25MG TABS] 18 tablet 0     Sig: TAKE 1 TABLET BY MOUTH AT ONSET OF HEADACHE FOR MIGRAINE       Serotonin Agonists Failed - 7/17/2023  5:04 AM        Failed - Serotonin Agonist request needs review.     Please review patient's record. If patient has had 8 or more treatments in the past month, please forward to provider.          Passed - Blood pressure under 140/90 in past 12 months     BP Readings from Last 3 Encounters:   06/29/23 116/74   06/17/23 128/79   06/01/23 115/55                 Passed - Recent (12 mo) or future (30 days) visit within the authorizing provider's specialty     Patient has had an office visit with the authorizing provider or a provider within the authorizing providers department within the previous 12 mos or has a future within next 30 days. See \"Patient Info\" tab in inbasket, or \"Choose Columns\" in Meds & Orders section of the refill encounter.              Passed - Medication is active on med list        Passed - Patient is age 18 or older        Passed - No active pregnancy on record        Passed - No positive pregnancy test in past 12 months             "

## 2023-07-19 RX ORDER — SUMATRIPTAN 25 MG/1
TABLET, FILM COATED ORAL
Qty: 18 TABLET | Refills: 0 | Status: SHIPPED | OUTPATIENT
Start: 2023-07-19 | End: 2023-08-14

## 2023-08-05 DIAGNOSIS — M62.830 SPASM OF BACK MUSCLES: ICD-10-CM

## 2023-08-07 RX ORDER — METHOCARBAMOL 500 MG/1
500 TABLET, FILM COATED ORAL 4 TIMES DAILY PRN
Qty: 120 TABLET | Refills: 0 | Status: SHIPPED | OUTPATIENT
Start: 2023-08-07 | End: 2023-09-01

## 2023-08-13 DIAGNOSIS — G43.909 MIGRAINE WITHOUT STATUS MIGRAINOSUS, NOT INTRACTABLE, UNSPECIFIED MIGRAINE TYPE: ICD-10-CM

## 2023-08-13 DIAGNOSIS — E11.9 TYPE 2 DIABETES MELLITUS WITHOUT COMPLICATION, WITHOUT LONG-TERM CURRENT USE OF INSULIN (H): ICD-10-CM

## 2023-08-14 RX ORDER — SUMATRIPTAN 25 MG/1
TABLET, FILM COATED ORAL
Qty: 18 TABLET | Refills: 0 | Status: SHIPPED | OUTPATIENT
Start: 2023-08-14 | End: 2023-09-14

## 2023-08-14 RX ORDER — SIMVASTATIN 5 MG
5 TABLET ORAL
Qty: 90 TABLET | Refills: 0 | Status: SHIPPED | OUTPATIENT
Start: 2023-08-14 | End: 2023-11-13

## 2023-09-01 DIAGNOSIS — M62.830 SPASM OF BACK MUSCLES: ICD-10-CM

## 2023-09-01 RX ORDER — METHOCARBAMOL 500 MG/1
500 TABLET, FILM COATED ORAL 4 TIMES DAILY PRN
Qty: 120 TABLET | Refills: 0 | Status: SHIPPED | OUTPATIENT
Start: 2023-09-01 | End: 2023-10-02

## 2023-09-12 DIAGNOSIS — Q04.6 COLLOID CYST OF THIRD VENTRICLE (H): Primary | ICD-10-CM

## 2023-09-14 DIAGNOSIS — G43.909 MIGRAINE WITHOUT STATUS MIGRAINOSUS, NOT INTRACTABLE, UNSPECIFIED MIGRAINE TYPE: ICD-10-CM

## 2023-09-14 RX ORDER — SUMATRIPTAN 25 MG/1
TABLET, FILM COATED ORAL
Qty: 18 TABLET | Refills: 0 | Status: SHIPPED | OUTPATIENT
Start: 2023-09-14 | End: 2023-10-13

## 2023-09-15 DIAGNOSIS — G43.909 MIGRAINE WITHOUT STATUS MIGRAINOSUS, NOT INTRACTABLE, UNSPECIFIED MIGRAINE TYPE: ICD-10-CM

## 2023-09-15 RX ORDER — SUMATRIPTAN 25 MG/1
TABLET, FILM COATED ORAL
Qty: 18 TABLET | Refills: 0 | OUTPATIENT
Start: 2023-09-15

## 2023-10-01 DIAGNOSIS — M62.830 SPASM OF BACK MUSCLES: ICD-10-CM

## 2023-10-01 DIAGNOSIS — E11.9 TYPE 2 DIABETES MELLITUS WITHOUT COMPLICATION, WITHOUT LONG-TERM CURRENT USE OF INSULIN (H): ICD-10-CM

## 2023-10-01 NOTE — PROGRESS NOTES
INITIAL NEUROLOGY CONSULTATION    DATE OF VISIT: 10/2/2023  CLINIC LOCATION: [facility]  MRN: [id number]  PATIENT NAME: Brittney Moon  YOB: 1975    REASON FOR VISIT: No chief complaint on file.    HISTORY OF PRESENT ILLNESS:                                                    Ms. Brittney Moon is 47 year old right handed female patient with past medical history of obesity, diabetes mellitus type 2, anxiety, complex regional pain syndrome, and opioid dependence, who was seen today for colloid cyst of the third ventricle found on CT incidentally.    Per patient's report, in the beginning of September 2023 she had a fall and had head CT at [facility] in [place].  It demonstrated incidental finding of 3 mm colloid cyst in the region of the foramen of Monro, but no acute intracranial findings.  She contacted her primary care provider who placed a referral to see neurology.  She denies any additional focal neurological symptoms.    Laboratory evaluation from June 2023 includes elevated creatinine of 1.14, normal CRP, normal troponin, CBC, INR, PTT, and elevated D-dimer (0.68).    According to Care Everywhere, cervical spine CT was done on the same day and did not demonstrate any traumatic injury.  PAST MEDICAL/SURGICAL HISTORY:                                                    I personally reviewed patient's past medical and surgical history with the patient at today's visit.  MEDICATIONS:                                                    I personally reviewed patient's medications and allergies with the patient at today's visit.  ALLERGIES:                                                      Allergies   Allergen Reactions    Food Anaphylaxis     cilantro    Codeine Other (See Comments)    Hydromorphone Nausea and Vomiting    Coriandrum Sativum Rash     EXAM:                                                    VITAL SIGNS:   LMP  (LMP Unknown)   Mini-Cog Assessment:       General: pt  is in NAD, cooperative.  Skin: normal turgor, moist mucous membranes, no lesions/rashes noticed.  HEENT: ATNC, EOMI, PERRL, white sclera, normal conjunctiva, no nystagmus or ptosis. No carotid bruits bilaterally.  Respiratory: lung sounds clear to auscultation bilaterally, no crackles, wheezes, rhonchi. Symmetric lung excursion, no accessory respiratory muscle use.  Cardiovascular: normal S1/S2, no murmurs/rubs/gallops.   Abdomen: Not distended.  : deferred.    Neurological:  Mental: alert, follows commands,  /5 with ***/3 on memory recall, no aphasia or dysarthria. Fund of knowledge is {MYAPPROPRIATE:867375}  Cranial Nerves:  CN II: visual acuity - able to accurately count fingers with each eye. Visual fields intact, fundi: discs sharp, no papilledema and normal vessels bilaterally.  CN III, IV, VI: EOM intact, pupils equal and reactive  CN V: facial sensation nl  CN VII: face symmetric, no facial droop  CN VIII: hearing normal  CN IX: palate elevation symmetric, uvula at midline  CN XI SCM normal, shoulder shrug nl  CN XII: tongue midline  Motor: Strength: 5/5 in all major groups of all extremities. Normal tone. No abnormal movements. No pronator drift b/l.  Reflexes: Triceps, biceps, brachioradialis, patellar, and achilles reflexes normal and symmetric. No clonus noted. Toes are down-going b/l.   Sensory: light touch, pinprick, and vibration intact. Romberg: negative.  Coordination: FNF and heel-shin tests intact b/l.   Gait:  Normal, able to tandem walk *** without difficulty.  DATA:   LABS/EEG/IMAGING/OTHER STUDIES: I reviewed pertinent medical records, as detailed in the history of present illness.  ASSESSMENT AND PLAN:      ASSESSMENT: Brittney Moon is a 47 year old female patient with listed above past medical history, who presents with ***.    We had a detailed discussion with the patient regarding her presenting complaints.  The neurological exam today is ***.    DIAGNOSES:  No diagnosis found.  PLAN:  There are no Patient Instructions on file for this visit.    Total Time: *** minutes spent on the date of the encounter doing chart review, history and exam, documentation and further activities per the note.    Shawn Starr MD  [facility] Neurology  (Chart documentation was completed in part with Dragon voice-recognition software. Even though reviewed, some grammatical, spelling, and word errors may remain.)

## 2023-10-02 ENCOUNTER — OFFICE VISIT (OUTPATIENT)
Dept: NEUROLOGY | Facility: CLINIC | Age: 48
End: 2023-10-02
Attending: PHYSICIAN ASSISTANT
Payer: COMMERCIAL

## 2023-10-02 ENCOUNTER — TELEPHONE (OUTPATIENT)
Dept: NEUROSURGERY | Facility: CLINIC | Age: 48
End: 2023-10-02

## 2023-10-02 VITALS — DIASTOLIC BLOOD PRESSURE: 83 MMHG | SYSTOLIC BLOOD PRESSURE: 121 MMHG | OXYGEN SATURATION: 97 % | HEART RATE: 52 BPM

## 2023-10-02 DIAGNOSIS — R41.9 COGNITIVE COMPLAINTS: ICD-10-CM

## 2023-10-02 DIAGNOSIS — M54.81 CERVICO-OCCIPITAL NEURALGIA OF LEFT SIDE: ICD-10-CM

## 2023-10-02 DIAGNOSIS — Q04.6 COLLOID CYST OF THIRD VENTRICLE (H): Primary | ICD-10-CM

## 2023-10-02 PROCEDURE — 99205 OFFICE O/P NEW HI 60 MIN: CPT | Performed by: PSYCHIATRY & NEUROLOGY

## 2023-10-02 RX ORDER — METHOCARBAMOL 500 MG/1
TABLET, FILM COATED ORAL
Qty: 120 TABLET | Refills: 0 | Status: SHIPPED | OUTPATIENT
Start: 2023-10-02 | End: 2023-10-10

## 2023-10-02 RX ORDER — METFORMIN HCL 500 MG
500 TABLET, EXTENDED RELEASE 24 HR ORAL 2 TIMES DAILY WITH MEALS
Qty: 180 TABLET | Refills: 1 | Status: SHIPPED | OUTPATIENT
Start: 2023-10-02 | End: 2024-03-27

## 2023-10-02 NOTE — PATIENT INSTRUCTIONS
AFTER VISIT SUMMARY (AVS):    At today's visit we thoroughly discussed various diagnostic possibilities for your symptoms, necessary evaluation, and the plan, which includes:  Orders Placed This Encounter   Procedures    CT Head w/o Contrast    Neurosurgery Referral    Physical Therapy Referral     Regarding your suspected occipital neuralgia, we discussed importance of ergonomic positioning during the day and night and also role of physical therapy.  We also discussed option of occipital nerve block with steroid if these measures do not help.    No new medications.     Additional recommendations after the work-up.    Next follow-up appointment is in the next 6 months or earlier if needed.    Please do not hesitate to call me with any questions or concerns.    Thanks.

## 2023-10-02 NOTE — LETTER
10/2/2023         RE: Brittney Moon  [address]  [address]  [facility] [address]        Dear Colleague,    Thank you for referring your patient, Brittney Moon, to the [facility]UR[facility]. Please see a copy of my visit note below.    INITIAL NEUROLOGY CONSULTATION    DATE OF VISIT: 10/2/2023  CLINIC LOCATION: [facility]  MRN: [id number]  PATIENT NAME: Brittney Moon  YOB: 1975    REASON FOR VISIT:   Chief Complaint   Patient presents with     Consult     Abnormal Imaging      HISTORY OF PRESENT ILLNESS:                                                    Ms. 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  Accompanied by her mother.    Per patient's report, in the beginning of September 2023 she had a fall and had head CT at [facility] in [place].  It demonstrated incidental finding of 3 mm colloid cyst in the region of the foramen of Monro, but no acute intracranial findings.  She contacted her primary care provider, who placed a referral to see neurology.      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  They could radiate up her scalp and all the way to her eye at times.  They respond well to Tylenol or an nap and feel different from her migraines that are controlled at the present time with topiramate for headache prevention and Imitrex for acute therapy.      In addition, she reports cognitive  difficulties and vision changes (near vision is blurry while reading or working on the computer).  She denies any additional focal neurological symptoms.  She denies prior history of head injuries, seizures, or CNS infections.  Her sensation in the left lower extremity was reduced since birth, and eventually she had left above-the-knee amputation.    Laboratory evaluation from June 2023 includes elevated creatinine of 1.14, normal CRP, normal troponin, CBC, INR, PTT, and elevated D-dimer (0.68).    The patient had head CT and head and neck CTA on 6/1/2023 for complaints of left-sided numbness and headache.  It was negative for acute intracranial process, significant stenosis, aneurysm, high flow vascular malformation of cerebral vessels or any hemodynamically significant stenosis/dissection in the neck vessels.  Images were personally reviewed and independently interpreted.  I was also able to review head CT from outside facility in PACS.  I discussed the case with our neuroradiologist.    According to Care Everywhere, cervical spine CT was done on the same day and did not demonstrate any traumatic injury.  PAST MEDICAL/SURGICAL HISTORY:                                                    I personally reviewed patient's past medical and surgical history with the patient at today's visit.  MEDICATIONS:                                                    I personally reviewed patient's medications and allergies with the patient at today's visit.  ALLERGIES:                                                      Allergies   Allergen Reactions     Food Anaphylaxis     cilantro     Codeine Other (See Comments)     Hydromorphone Nausea and Vomiting     Coriandrum Sativum Rash     EXAM:                                                    VITAL SIGNS:   /83 (BP Location: Left arm, Patient Position: Sitting, Cuff Size: Adult Large)   Pulse 52   LMP  (LMP Unknown)   SpO2 97%   Mini-Cog Assessment:  Mini Cog  Assessment  Clock Draw Score: 2 Normal  3 Item Recall: 1 object recalled  Mini Cog Total Score: 3  Administered by: : Lary LACY    General: pt is in NAD, cooperative.  Skin: normal turgor, moist mucous membranes, no lesions/rashes noticed.  HEENT: ATNC, EOMI, PERRL, white sclera, normal conjunctiva, no nystagmus or ptosis. No carotid bruits bilaterally.  Respiratory: lung sounds clear to auscultation bilaterally, no crackles, wheezes, rhonchi. Symmetric lung excursion, no accessory respiratory muscle use.  Cardiovascular: normal S1/S2, no murmurs/rubs/gallops.   Abdomen: Not distended.  : deferred.    Neurological:  Mental: alert, follows commands, Mini Cog Total Score: 3/5 with 1/3 on memory recall, no aphasia or dysarthria. Fund of knowledge is appropriate for age.  Cranial Nerves:  CN II: visual acuity - able to accurately count fingers with each eye. Visual fields intact, fundi: discs sharp, no papilledema and normal vessels bilaterally.  CN III, IV, VI: EOM intact, pupils equal and reactive  CN V: facial sensation nl  CN VII: face symmetric, no facial droop  CN VIII: hearing normal  CN IX: palate elevation symmetric, uvula at midline  CN XI SCM normal, shoulder shrug nl  CN XII: tongue midline  Motor: Strength: 5/5 in all major groups of both upper and right lower extremities. Normal tone. No abnormal movements. No pronator drift b/l.  Reflexes: Triceps, biceps, and brachioradialis reflexes normal and symmetric, patellar and achilles reflexes are reduced on the right. No clonus noted. Toes are down-going on the right.   Sensory: light touch, pinprick, and vibration intact. Romberg: Not tested.  Coordination: FNF tests intact b/l.   Gait:   Presented to in the motorized wheelchair due to left above-the-knee amputation.  There is tenderness to abrasion over the left lesser occipital nerve.  DATA:   LABS/EEG/IMAGING/OTHER STUDIES: I reviewed pertinent medical records, as detailed in the history of present  illness.  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    We had a detailed discussion with the patient and her mother regarding her presenting complaints.  The neurological exam today is non-focal.  We reviewed her previous head CT from September 2023 and June 2023.  We discussed that in my opinion it would be unlikely that this suspected colloid cyst would cause any symptoms, but she should establish care with neurosurgery while we monitor it for interval changes.  I would suggest repeating her head CT scan in 6 months from now.    Regarding her cognitive complaints, I suspect that they might be related to her topiramate, which could be adjusted in the future if desired.    Regarding her left-sided headache, it would be most likely consistent with left lesser occipital neuralgia.  We discussed the diagnosis, available treatment options, and the plan.  We decided to start with physical therapy while she does ergonomic adjustments.    DIAGNOSES:    ICD-10-CM    1. Colloid cyst of third ventricle (H)  Q04.6 Adult Neurology  Referral     CT Head w/o Contrast     Neurosurgery Referral      2. Cervico-occipital neuralgia of left side  M54.81 Physical Therapy Referral      3. Cognitive complaints  R41.9         PLAN: At today's visit we thoroughly discussed various diagnostic possibilities for patient's symptoms, necessary evaluation, and the plan, which includes:  Orders Placed This Encounter   Procedures     CT Head w/o Contrast     Neurosurgery Referral     Physical Therapy Referral     Regarding suspected occipital neuralgia, we discussed importance of ergonomic positioning during the day and night and also role of physical therapy.  We also discussed option of occipital nerve block with steroid if these measures do not help.    No new  "medications.     Additional recommendations after the work-up.    Next follow-up appointment is in the next 6 months or earlier if needed.    Total Time: 71 minutes spent on the date of the encounter doing chart review, history and exam, documentation and further activities per the note.    Shawn Starr MD  [facility] Neurology  (Chart documentation was completed in part with Dragon voice-recognition software. Even though reviewed, some grammatical, spelling, and word errors may remain.)          Brittney Moon is a 47 year old female who presents for:  Chief Complaint   Patient presents with     Consult     Abnormal Imaging         Initial Vitals:  /83 (BP Location: Left arm, Patient Position: Sitting, Cuff Size: Adult Large)   Pulse 52   LMP  (LMP Unknown)   SpO2 97%  Estimated body mass index is 48.47 kg/m  as calculated from the following:    Height as of 6/29/23: 1.499 m (4' 11\").    Weight as of 6/29/23: 108.9 kg (240 lb).. There is no height or weight on file to calculate BSA. BP completed using cuff size: presley Godinez       Again, thank you for allowing me to participate in the care of your patient.        Sincerely,        Shawn Starr MD  "

## 2023-10-02 NOTE — TELEPHONE ENCOUNTER
1st attempt at workque referral. I called and left a voice mail.    Referred by Shawn Starr MD in [facility]  New dx Colloid cyst of third ventricle (H) [Q04.6]  CT Cervical Spine 9/2/23 - [facility]  Last seen in clinic 10/24/2019 - for Lumbar radiculopathy

## 2023-10-02 NOTE — PROGRESS NOTES
"Brittney Moon is a 47 year old female who presents for:  Chief Complaint   Patient presents with    Consult     Abnormal Imaging         Initial Vitals:  /83 (BP Location: Left arm, Patient Position: Sitting, Cuff Size: Adult Large)   Pulse 52   LMP  (LMP Unknown)   SpO2 97%  Estimated body mass index is 48.47 kg/m  as calculated from the following:    Height as of 6/29/23: 1.499 m (4' 11\").    Weight as of 6/29/23: 108.9 kg (240 lb).. There is no height or weight on file to calculate BSA. BP completed using cuff size: presley Godinez   "

## 2023-10-02 NOTE — PROGRESS NOTES
INITIAL NEUROLOGY CONSULTATION    DATE OF VISIT: 10/2/2023  CLINIC LOCATION: [facility]  MRN: [id number]  PATIENT NAME: Brittney Moon  YOB: 1975    REASON FOR VISIT:   Chief Complaint   Patient presents with    Consult     Abnormal Imaging      HISTORY OF PRESENT ILLNESS:                                                    Ms. 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  Accompanied by her mother.    Per patient's report, in the beginning of September 2023 she had a fall and had head CT at [facility] in [place].  It demonstrated incidental finding of 3 mm colloid cyst in the region of the foramen of Monro, but no acute intracranial findings.  She contacted her primary care provider, who placed a referral to see neurology.      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  They could radiate up her scalp and all the way to her eye at times.  They respond well to Tylenol or an nap and feel different from her migraines that are controlled at the present time with topiramate for headache prevention and Imitrex for acute therapy.      In addition, she reports cognitive difficulties and vision changes (near vision is blurry while reading or working on the computer).  She denies any additional focal neurological symptoms.  She denies prior history of head injuries, seizures, or CNS infections.  Her sensation in the left lower extremity was  reduced since birth, and eventually she had left above-the-knee amputation.    Laboratory evaluation from June 2023 includes elevated creatinine of 1.14, normal CRP, normal troponin, CBC, INR, PTT, and elevated D-dimer (0.68).    The patient had head CT and head and neck CTA on 6/1/2023 for complaints of left-sided numbness and headache.  It was negative for acute intracranial process, significant stenosis, aneurysm, high flow vascular malformation of cerebral vessels or any hemodynamically significant stenosis/dissection in the neck vessels.  Images were personally reviewed and independently interpreted.  I was also able to review head CT from outside facility in PACS.  I discussed the case with our neuroradiologist.    According to Care Everywhere, cervical spine CT was done on the same day and did not demonstrate any traumatic injury.  PAST MEDICAL/SURGICAL HISTORY:                                                    I personally reviewed patient's past medical and surgical history with the patient at today's visit.  MEDICATIONS:                                                    I personally reviewed patient's medications and allergies with the patient at today's visit.  ALLERGIES:                                                      Allergies   Allergen Reactions    Food Anaphylaxis     cilantro    Codeine Other (See Comments)    Hydromorphone Nausea and Vomiting    Coriandrum Sativum Rash     EXAM:                                                    VITAL SIGNS:   /83 (BP Location: Left arm, Patient Position: Sitting, Cuff Size: Adult Large)   Pulse 52   LMP  (LMP Unknown)   SpO2 97%   Mini-Cog Assessment:  Mini Cog Assessment  Clock Draw Score: 2 Normal  3 Item Recall: 1 object recalled  Mini Cog Total Score: 3  Administered by: : Lary LACY    General: pt is in NAD, cooperative.  Skin: normal turgor, moist mucous membranes, no lesions/rashes noticed.  HEENT: ATNC, EOMI, PERRL, white sclera, normal  conjunctiva, no nystagmus or ptosis. No carotid bruits bilaterally.  Respiratory: lung sounds clear to auscultation bilaterally, no crackles, wheezes, rhonchi. Symmetric lung excursion, no accessory respiratory muscle use.  Cardiovascular: normal S1/S2, no murmurs/rubs/gallops.   Abdomen: Not distended.  : deferred.    Neurological:  Mental: alert, follows commands, Mini Cog Total Score: 3/5 with 1/3 on memory recall, no aphasia or dysarthria. Fund of knowledge is appropriate for age.  Cranial Nerves:  CN II: visual acuity - able to accurately count fingers with each eye. Visual fields intact, fundi: discs sharp, no papilledema and normal vessels bilaterally.  CN III, IV, VI: EOM intact, pupils equal and reactive  CN V: facial sensation nl  CN VII: face symmetric, no facial droop  CN VIII: hearing normal  CN IX: palate elevation symmetric, uvula at midline  CN XI SCM normal, shoulder shrug nl  CN XII: tongue midline  Motor: Strength: 5/5 in all major groups of both upper and right lower extremities. Normal tone. No abnormal movements. No pronator drift b/l.  Reflexes: Triceps, biceps, and brachioradialis reflexes normal and symmetric, patellar and achilles reflexes are reduced on the right. No clonus noted. Toes are down-going on the right.   Sensory: light touch, pinprick, and vibration intact. Romberg: Not tested.  Coordination: FNF tests intact b/l.   Gait:   Presented to in the motorized wheelchair due to left above-the-knee amputation.  There is tenderness to abrasion over the left lesser occipital nerve.  DATA:   LABS/EEG/IMAGING/OTHER STUDIES: I reviewed pertinent medical records, as detailed in the history of present illness.  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    We had a detailed discussion with the patient and her mother  regarding her presenting complaints.  The neurological exam today is non-focal.  We reviewed her previous head CT from September 2023 and June 2023.  We discussed that in my opinion it would be unlikely that this suspected colloid cyst would cause any symptoms, but she should establish care with neurosurgery while we monitor it for interval changes.  I would suggest repeating her head CT scan in 6 months from now.    Regarding her cognitive complaints, I suspect that they might be related to her topiramate, which could be adjusted in the future if desired.    Regarding her left-sided headache, it would be most likely consistent with left lesser occipital neuralgia.  We discussed the diagnosis, available treatment options, and the plan.  We decided to start with physical therapy while she does ergonomic adjustments.    DIAGNOSES:    ICD-10-CM    1. Colloid cyst of third ventricle (H)  Q04.6 Adult Neurology  Referral     CT Head w/o Contrast     Neurosurgery Referral      2. Cervico-occipital neuralgia of left side  M54.81 Physical Therapy Referral      3. Cognitive complaints  R41.9         PLAN: At today's visit we thoroughly discussed various diagnostic possibilities for patient's symptoms, necessary evaluation, and the plan, which includes:  Orders Placed This Encounter   Procedures    CT Head w/o Contrast    Neurosurgery Referral    Physical Therapy Referral     Regarding suspected occipital neuralgia, we discussed importance of ergonomic positioning during the day and night and also role of physical therapy.  We also discussed option of occipital nerve block with steroid if these measures do not help.    No new medications.     Additional recommendations after the work-up.    Next follow-up appointment is in the next 6 months or earlier if needed.    Total Time: 71 minutes spent on the date of the encounter doing chart review, history and exam, documentation and further activities per the  note.    Shawn Starr MD  [facility] Neurology  (Chart documentation was completed in part with Dragon voice-recognition software. Even though reviewed, some grammatical, spelling, and word errors may remain.)

## 2023-10-10 ENCOUNTER — OFFICE VISIT (OUTPATIENT)
Dept: FAMILY MEDICINE | Facility: OTHER | Age: 48
End: 2023-10-10
Payer: COMMERCIAL

## 2023-10-10 VITALS
HEIGHT: 59 IN | WEIGHT: 250 LBS | BODY MASS INDEX: 50.4 KG/M2 | RESPIRATION RATE: 22 BRPM | HEART RATE: 74 BPM | DIASTOLIC BLOOD PRESSURE: 80 MMHG | OXYGEN SATURATION: 95 % | SYSTOLIC BLOOD PRESSURE: 118 MMHG | TEMPERATURE: 99 F

## 2023-10-10 DIAGNOSIS — M62.830 SPASM OF BACK MUSCLES: ICD-10-CM

## 2023-10-10 DIAGNOSIS — G62.9 NEUROPATHY: ICD-10-CM

## 2023-10-10 DIAGNOSIS — E11.9 TYPE 2 DIABETES MELLITUS WITHOUT COMPLICATION, WITHOUT LONG-TERM CURRENT USE OF INSULIN (H): Primary | ICD-10-CM

## 2023-10-10 LAB
CHOLEST SERPL-MCNC: 143 MG/DL
CREAT UR-MCNC: 199 MG/DL
HBA1C MFR BLD: 5.7 % (ref 0–5.6)
HDLC SERPL-MCNC: 45 MG/DL
LDLC SERPL CALC-MCNC: 79 MG/DL
MICROALBUMIN UR-MCNC: <12 MG/L
MICROALBUMIN/CREAT UR: NORMAL MG/G{CREAT}
NONHDLC SERPL-MCNC: 98 MG/DL
TRIGL SERPL-MCNC: 94 MG/DL

## 2023-10-10 PROCEDURE — 99214 OFFICE O/P EST MOD 30 MIN: CPT | Mod: 25 | Performed by: PHYSICIAN ASSISTANT

## 2023-10-10 PROCEDURE — 90471 IMMUNIZATION ADMIN: CPT | Performed by: PHYSICIAN ASSISTANT

## 2023-10-10 PROCEDURE — 82570 ASSAY OF URINE CREATININE: CPT | Performed by: PHYSICIAN ASSISTANT

## 2023-10-10 PROCEDURE — 90682 RIV4 VACC RECOMBINANT DNA IM: CPT | Performed by: PHYSICIAN ASSISTANT

## 2023-10-10 PROCEDURE — 36415 COLL VENOUS BLD VENIPUNCTURE: CPT | Performed by: PHYSICIAN ASSISTANT

## 2023-10-10 PROCEDURE — 82043 UR ALBUMIN QUANTITATIVE: CPT | Performed by: PHYSICIAN ASSISTANT

## 2023-10-10 PROCEDURE — 80061 LIPID PANEL: CPT | Performed by: PHYSICIAN ASSISTANT

## 2023-10-10 PROCEDURE — 83036 HEMOGLOBIN GLYCOSYLATED A1C: CPT | Performed by: PHYSICIAN ASSISTANT

## 2023-10-10 RX ORDER — GABAPENTIN 300 MG/1
CAPSULE ORAL
Qty: 90 CAPSULE | Refills: 3 | Status: SHIPPED | OUTPATIENT
Start: 2023-10-10 | End: 2024-03-07

## 2023-10-10 RX ORDER — METHOCARBAMOL 500 MG/1
TABLET, FILM COATED ORAL
Qty: 120 TABLET | Refills: 3 | Status: SHIPPED | OUTPATIENT
Start: 2023-10-10 | End: 2024-03-06

## 2023-10-10 ASSESSMENT — PATIENT HEALTH QUESTIONNAIRE - PHQ9
10. IF YOU CHECKED OFF ANY PROBLEMS, HOW DIFFICULT HAVE THESE PROBLEMS MADE IT FOR YOU TO DO YOUR WORK, TAKE CARE OF THINGS AT HOME, OR GET ALONG WITH OTHER PEOPLE: NOT DIFFICULT AT ALL
SUM OF ALL RESPONSES TO PHQ QUESTIONS 1-9: 0
SUM OF ALL RESPONSES TO PHQ QUESTIONS 1-9: 0

## 2023-10-10 NOTE — PROGRESS NOTES
"  Assessment & Plan     Type 2 diabetes mellitus without complication, without long-term current use of insulin (H)  Patient's past A1cs have been within the normal range. At the time of her last check in April 2023 we agreed to reduce dose of metformin to 500mg twice daily. Will see what A1c returns at today. If it remains within normal range can look at further reduction in dose. No other concerns at this time.   - Lipid panel reflex to direct LDL Non-fasting; Future  - Albumin Random Urine Quantitative with Creat Ratio; Future  - HEMOGLOBIN A1C; Future  - Lipid panel reflex to direct LDL Non-fasting  - Albumin Random Urine Quantitative with Creat Ratio  - HEMOGLOBIN A1C    Neuropathy  Patient has been tolerating the gabapentin without adverse effect. She feels that the medication has made a difference in her pain levels. She is working with neurosurgery and pain clinic to address concerns about pain, colloid cyst and neuralgias. She informs me that she has been advised on FMLA through work due to inability to make accommodations to help with her pain. I will defer to the specialist for management of this leave of absence.   - gabapentin (NEURONTIN) 300 MG capsule; 1 cap (300mg) in the AM and 2 caps (600mg) at bed    Spasm of back muscles  Has been tolerating the methocarbamol without adverse effect. Patient has used this medication for some time and feels that it is helpful. We discussed avoid concurrent use with gabapentin due to sedating effects of each medication.   - methocarbamol (ROBAXIN) 500 MG tablet; TAKE ONE TABLET BY MOUTH FOUR TIMES A DAY AS NEEDED FOR MUSCLE SPASMS     BMI:   Estimated body mass index is 50.49 kg/m  as calculated from the following:    Height as of this encounter: 1.499 m (4' 11\").    Weight as of this encounter: 113.4 kg (250 lb).   Weight management plan: Discussed healthy diet and exercise guidelines        ELIZABETH Moreno [facility] " "  Gavi is a 47 year old, presenting for the following health issues:  Diabetes      10/10/2023     1:34 PM   Additional Questions   Roomed by Anat BUENROSTRO   Accompanied by self       History of Present Illness       Diabetes:   She presents for follow up of diabetes.    She is not checking blood glucose.         She has no concerns regarding her diabetes at this time.  She is having numbness in feet.  The patient has not had a diabetic eye exam in the last 12 months.          Headaches:   Since the patient's last clinic visit, headaches are: no change  The patient is getting headaches:  Couple of times a month  She is not able to do normal daily activities when she has a migraine.  The patient is taking the following rescue/relief medications:  Sumatriptan (Imitrex)   Patient states \"I get some relief\" from the rescue/relief medications.   The patient is taking the following medications to prevent migraines:  No medications to prevent migraines  In the past 4 weeks, the patient has gone to an Urgent Care or Emergency Room 0 times times due to headaches.    She eats 0-1 servings of fruits and vegetables daily.She exercises with enough effort to increase her heart rate 20 to 29 minutes per day.  She exercises with enough effort to increase her heart rate 4 days per week.   She is taking medications regularly.     Eye exam in the near future   Leave of absence for work this is due to lack of employer accomodation with concerns raised through pain specialist     Review of Systems   Constitutional, HEENT, cardiovascular, pulmonary, gi and gu systems are negative, except as otherwise noted.      Objective    /80   Pulse 74   Temp 99  F (37.2  C) (Temporal)   Resp 22   Ht 1.499 m (4' 11\")   Wt 113.4 kg (250 lb)   LMP  (LMP Unknown)   SpO2 95%   BMI 50.49 kg/m    Body mass index is 50.49 kg/m .  Physical Exam   GENERAL: healthy, alert and no distress  EYES: Eyes grossly normal to inspection, PERRL and conjunctivae " and sclerae normal  NECK: no adenopathy, no asymmetry, masses, or scars and thyroid normal to palpation  RESP: lungs clear to auscultation - no rales, rhonchi or wheezes  CV: regular rate and rhythm, normal S1 S2, no S3 or S4, no murmur, click or rub, no peripheral edema and peripheral pulses strong  MS: Consistent with previous exam, left leg amputated.     No results found for any visits on 10/10/23.

## 2023-10-12 DIAGNOSIS — G43.909 MIGRAINE WITHOUT STATUS MIGRAINOSUS, NOT INTRACTABLE, UNSPECIFIED MIGRAINE TYPE: ICD-10-CM

## 2023-10-13 RX ORDER — SUMATRIPTAN 25 MG/1
TABLET, FILM COATED ORAL
Qty: 18 TABLET | Refills: 1 | Status: SHIPPED | OUTPATIENT
Start: 2023-10-13 | End: 2024-04-26

## 2023-10-28 ENCOUNTER — HEALTH MAINTENANCE LETTER (OUTPATIENT)
Age: 48
End: 2023-10-28

## 2023-11-13 DIAGNOSIS — E11.9 TYPE 2 DIABETES MELLITUS WITHOUT COMPLICATION, WITHOUT LONG-TERM CURRENT USE OF INSULIN (H): ICD-10-CM

## 2023-11-13 RX ORDER — SIMVASTATIN 5 MG
TABLET ORAL
Qty: 90 TABLET | Refills: 3 | Status: SHIPPED | OUTPATIENT
Start: 2023-11-13

## 2023-11-27 ENCOUNTER — PRE VISIT (OUTPATIENT)
Dept: NEUROSURGERY | Facility: CLINIC | Age: 48
End: 2023-11-27
Payer: COMMERCIAL

## 2023-11-27 NOTE — CONFIDENTIAL NOTE
NEUROSURGERY- NEW PREVISIT PLANNING       Record Status/Location     Referring Provider Referral   Shawn Starr MD      Diagnosis Referral Q04.6 (ICD-10-CM) - Colloid cyst of third ventricle (H)    MRI (HEAD, NECK, SPINE) Na     CT Pacs  Report: CE Head 9/6/23 [facility]   X-ray Na    SURGERY Na

## 2023-12-07 ENCOUNTER — TELEPHONE (OUTPATIENT)
Dept: OPHTHALMOLOGY | Facility: CLINIC | Age: 48
End: 2023-12-07

## 2023-12-07 ENCOUNTER — OFFICE VISIT (OUTPATIENT)
Dept: NEUROSURGERY | Facility: CLINIC | Age: 48
End: 2023-12-07
Attending: PSYCHIATRY & NEUROLOGY
Payer: COMMERCIAL

## 2023-12-07 VITALS
HEART RATE: 84 BPM | RESPIRATION RATE: 18 BRPM | OXYGEN SATURATION: 98 % | DIASTOLIC BLOOD PRESSURE: 84 MMHG | SYSTOLIC BLOOD PRESSURE: 136 MMHG | TEMPERATURE: 97.9 F

## 2023-12-07 DIAGNOSIS — Q04.6 COLLOID CYST OF THIRD VENTRICLE (H): ICD-10-CM

## 2023-12-07 PROCEDURE — 99204 OFFICE O/P NEW MOD 45 MIN: CPT | Performed by: NURSE PRACTITIONER

## 2023-12-07 ASSESSMENT — PAIN SCALES - GENERAL: PAINLEVEL: SEVERE PAIN (7)

## 2023-12-07 NOTE — PROGRESS NOTES
NEUROSURGERY CONSULT NOTE    CC: headaches    Primary care Provider: Pelon Cotter    Consulting provider:   Shawn Starr MD  [address] [place] [address]      HPI: Brittney Moon is a 47 year old female with a history of migraines that presents to the clinic after an ED visit for headache and left-sided pain after a fall. Imaging at that time revealed no acute traumatic injury, however an incidental small colloid cyst in the region of the foramen of Monro was identified. She was referred to neurology and was recommended to consult with neurosurgery as well as follow up with head CT in 6 months. Today she reports 6 months of headache which is different from her usual migraine symptoms. She describes pain on the right side of her head as well as the back of her head. She describes issues with short term memory loss. 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 She is unable to determine if there is any vision changes otherwise.  She reports anahy difficulty with balance due to prior injury. She inquires about next steps.     Past Medical History:   Diagnosis Date    Other convulsions     seizure disorder    Unspecified osteomyelitis, lower leg        Past Medical History reviewed with patient during visit.    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    Current Outpatient Medications   Medication    acetaminophen (TYLENOL) 500 MG tablet    cyanocobalamin (VITAMIN B-12) 1000 MCG tablet    FLUoxetine (PROZAC) 40 MG capsule    gabapentin (NEURONTIN) 300 MG capsule    HYDROcodone-acetaminophen (NORCO) 5-325 MG tablet    ketorolac (TORADOL) 10 MG tablet    metFORMIN (GLUCOPHAGE XR) 500 MG 24 hr tablet    methocarbamol (ROBAXIN) 500 MG tablet    simvastatin (ZOCOR) 5 MG tablet    SUMAtriptan (IMITREX) 25 MG tablet    topiramate (TOPAMAX) 100 MG tablet    topiramate (TOPAMAX) 50 MG tablet    traZODone (DESYREL) 100 MG tablet    Vitamin D3 (CHOLECALCIFEROL) 25 mcg (1000 units) tablet     No current facility-administered medications for this visit.       Allergies   Allergen Reactions    Food Anaphylaxis     cilantro    Codeine Other (See Comments)    Hydromorphone Nausea and Vomiting    Coriandrum Sativum Rash       Social History     Socioeconomic History    Marital status: Single     Spouse name: None    Number of children: None    Years of education: None    Highest education level: None   Tobacco Use    Smoking status: Never    Smokeless tobacco: Never   Vaping Use    Vaping Use: Never used   Substance and Sexual Activity    Alcohol use: Yes     Comment: rare    Drug use: No    Sexual activity: Never     Social Determinants of Health     Financial Resource Strain: Low Risk  (10/10/2023)    Financial Resource Strain     Within the past 12 months, have you or your family members you live with been unable to get utilities (heat, electricity) when it was really needed?: No   Food Insecurity: Low Risk  (10/10/2023)    Food Insecurity     Within the past 12 months, did you worry that your food would run out before you got money to buy more?: No     Within the past 12 months, did the food you bought just not last and you didn t have money to get more?: No   Transportation Needs: Low Risk  (10/10/2023)    Transportation Needs     Within the past 12 months, has lack  of transportation kept you from medical appointments, getting your medicines, non-medical meetings or appointments, work, or from getting things that you need?: No   Interpersonal Safety: Low Risk  (10/10/2023)    Interpersonal Safety     Do you feel physically and emotionally safe where you currently live?: Yes     Within the past 12 months, have you been hit, slapped, kicked or otherwise physically hurt by someone?: No     Within the past 12 months, have you been humiliated or emotionally abused in other ways by your partner or ex-partner?: No   Housing Stability: Low Risk  (10/10/2023)    Housing Stability     Do you have housing? : Yes     Are you worried about losing your housing?: No       No family history on file.       ROS: 10 point ROS neg other than the symptoms noted above in the HPI.    Vital Signs: /84   Pulse 84   Temp 97.9  F (36.6  C) (Temporal)   Resp 18   LMP  (LMP Unknown)   SpO2 98%     Examination:  Constitutional:  Alert, well nourished, NAD.  HEENT: Normocephalic, atraumatic.   Pulm:  Without shortness of breath or audible adventitious respiratory sounds.  CV:  No pitting edema of RLE. LLE AKA. Brisk capillary refill, CMS intact.    Neurological:  Awake  Alert  Oriented x 3  Speech clear  Cranial nerves II - XII intact  PERRL  EOMI  Face symmetric  Tongue midline  Motor exam Intact in all extremities  Sensation intact  Finger to Nose smooth  Pronator drift negative  Gait: Able to stand from a seated position. Normal non-antalgic, non-myelopathic gait.  Able to heel/toe walk without loss of balance    Imaging: Personally reviewed/Reviewed reported findings of head CT.     Assessment/Plan:   Colloid cyst    Reviewed head CT results. Will obtain brain MRI for further evaluation. She has SCS in lumbar spine although was told it is MRI compatible. She has completed these MRIs at [facility] in [place] in the past and would like to complete it there. Will also place referral for  neuro-opthalmology as well. She will follow up in clinic with Dr. Echols when MRI has been completed. She verbalized understanding and agreement.    The above assessment and plan was reviewed with the patient and Dr. Echols, who are in agreement with recommendations and plan.    Deysi Gallegos, [facility] Neurosurgery  [address] [place] [id number]  Tel [phone]  Fax [phone]

## 2023-12-07 NOTE — NURSING NOTE
"Brittney Moon is a 47 year old female who presents for:  Chief Complaint   Patient presents with    Neurologic Problem     Colloid cyst of third ventricle         Initial Vitals:  Pulse 84   Temp 97.9  F (36.6  C) (Temporal)   Resp 18   LMP  (LMP Unknown)   SpO2 98%  Estimated body mass index is 50.49 kg/m  as calculated from the following:    Height as of 10/10/23: 4' 11\" (1.499 m).    Weight as of 10/10/23: 250 lb (113.4 kg).. There is no height or weight on file to calculate BSA. BP completed using cuff size: large  Severe Pain (7)    Nursing Comments:     Tanja Praught    "

## 2023-12-07 NOTE — TELEPHONE ENCOUNTER
[facility] Call Center    Phone Message    May a detailed message be left on voicemail: yes     Reason for Call: Appointment Intake    Referring Provider Name: Deysi Gallegos NP in  NEUROSURGERY  Diagnosis and/or Symptoms: Colloid cyst of third ventricle (H) [Q04.6]    Dx not in protocols, Referring to Neuro Ophthalmology    Thank you,      Action Taken: Message routed to:  [facility]    Travel Screening: Not Applicable

## 2023-12-07 NOTE — LETTER
12/7/2023         RE: Brittney Moon  [address]  [address]  [facility] [address]        Dear Colleague,    Thank you for referring your patient, Brittney Moon, to the [facility]OS[facility] [place]. Please see a copy of my visit note below.    NEUROSURGERY CONSULT NOTE    CC: headaches    Primary care Provider: Pelon Cotter    Consulting provider:   Shawn Starr MD  [address] [address]      HPI: Brittney Moon is a 47 year old female with a history of migraines that presents to the clinic after an ED visit for headache and left-sided pain after a fall. Imaging at that time revealed no acute traumatic injury, however an incidental small colloid cyst in the region of the foramen of Monro was identified. She was referred to neurology and was recommended to consult with neurosurgery as well as follow up with head CT in 6 months. Today she reports 6 months of headache which is different from her usual migraine symptoms. She describes pain on the right side of her head as well as the back of her head. She describes issues with short term memory loss. 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 She is unable to determine if there is any vision changes otherwise.  She reports anahy difficulty with balance due to prior injury. She inquires about next steps.     Past Medical History:   Diagnosis Date     Other convulsions     seizure disorder     Unspecified osteomyelitis, lower leg        Past Medical History reviewed with patient during visit.    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    Current Outpatient Medications   Medication     acetaminophen (TYLENOL) 500 MG tablet     cyanocobalamin (VITAMIN B-12) 1000 MCG tablet     FLUoxetine (PROZAC) 40 MG capsule     gabapentin (NEURONTIN) 300 MG capsule     HYDROcodone-acetaminophen (NORCO) 5-325 MG tablet     ketorolac (TORADOL) 10 MG tablet     metFORMIN (GLUCOPHAGE XR) 500 MG 24 hr tablet     methocarbamol (ROBAXIN) 500 MG tablet     simvastatin (ZOCOR) 5 MG tablet     SUMAtriptan (IMITREX) 25 MG tablet     topiramate (TOPAMAX) 100 MG tablet     topiramate (TOPAMAX) 50 MG tablet     traZODone (DESYREL) 100 MG tablet     Vitamin D3 (CHOLECALCIFEROL) 25 mcg (1000 units) tablet     No current facility-administered medications for this visit.       Allergies   Allergen Reactions     Food Anaphylaxis     cilantro     Codeine Other (See Comments)     Hydromorphone Nausea and Vomiting     Coriandrum Sativum Rash       Social History     Socioeconomic History     Marital status: Single     Spouse name: None     Number of children: None     Years of education: None     Highest education level: None   Tobacco Use     Smoking status: Never     Smokeless tobacco: Never   Vaping Use     Vaping Use: Never used   Substance and Sexual Activity     Alcohol use: Yes     Comment: rare     Drug use: No     Sexual activity: Never     Social Determinants of Health     Financial Resource Strain: Low Risk  (10/10/2023)    Financial Resource Strain      Within the past 12 months, have you or your family members you live with been unable to get utilities (heat, electricity) when it was really needed?: No   Food Insecurity: Low Risk  (10/10/2023)    Food Insecurity      Within the past 12 months,  did you worry that your food would run out before you got money to buy more?: No      Within the past 12 months, did the food you bought just not last and you didn t have money to get more?: No   Transportation Needs: Low Risk  (10/10/2023)    Transportation Needs      Within the past 12 months, has lack of transportation kept you from medical appointments, getting your medicines, non-medical meetings or appointments, work, or from getting things that you need?: No   Interpersonal Safety: Low Risk  (10/10/2023)    Interpersonal Safety      Do you feel physically and emotionally safe where you currently live?: Yes      Within the past 12 months, have you been hit, slapped, kicked or otherwise physically hurt by someone?: No      Within the past 12 months, have you been humiliated or emotionally abused in other ways by your partner or ex-partner?: No   Housing Stability: Low Risk  (10/10/2023)    Housing Stability      Do you have housing? : Yes      Are you worried about losing your housing?: No       No family history on file.       ROS: 10 point ROS neg other than the symptoms noted above in the HPI.    Vital Signs: /84   Pulse 84   Temp 97.9  F (36.6  C) (Temporal)   Resp 18   LMP  (LMP Unknown)   SpO2 98%     Examination:  Constitutional:  Alert, well nourished, NAD.  HEENT: Normocephalic, atraumatic.   Pulm:  Without shortness of breath or audible adventitious respiratory sounds.  CV:  No pitting edema of RLE. LLE AKA. Brisk capillary refill, CMS intact.    Neurological:  Awake  Alert  Oriented x 3  Speech clear  Cranial nerves II - XII intact  PERRL  EOMI  Face symmetric  Tongue midline  Motor exam Intact in all extremities  Sensation intact  Finger to Nose smooth  Pronator drift negative  Gait: Able to stand from a seated position. Normal non-antalgic, non-myelopathic gait.  Able to heel/toe walk without loss of balance    Imaging: Personally reviewed/Reviewed reported findings of head CT.      Assessment/Plan:   Colloid cyst    Reviewed head CT results. Will obtain brain MRI for further evaluation. She has SCS in lumbar spine although was told it is MRI compatible. She has completed these MRIs at [facility] in [place] in the past and would like to complete it there. Will also place referral for neuro-opthalmology as well. She will follow up in clinic with Dr. Echols when MRI has been completed. She verbalized understanding and agreement.    The above assessment and plan was reviewed with the patient and Dr. Echols, who are in agreement with recommendations and plan.    Deysi Gallegos, ALICE  [facility] Neurosurgery  [address] [place] [id number]  Tel [phone]  Fax [phone]        Again, thank you for allowing me to participate in the care of your patient.        Sincerely,        Deysi Gallegos, RONEL

## 2023-12-10 ENCOUNTER — HOSPITAL ENCOUNTER (EMERGENCY)
Facility: CLINIC | Age: 48
Discharge: HOME | End: 2023-12-10
Attending: PHYSICIAN ASSISTANT | Admitting: PHYSICIAN ASSISTANT
Payer: COMMERCIAL

## 2023-12-10 ENCOUNTER — APPOINTMENT (OUTPATIENT)
Dept: GENERAL RADIOLOGY | Facility: CLINIC | Age: 48
End: 2023-12-10
Attending: PHYSICIAN ASSISTANT
Payer: COMMERCIAL

## 2023-12-10 VITALS
RESPIRATION RATE: 18 BRPM | OXYGEN SATURATION: 97 % | HEART RATE: 68 BPM | SYSTOLIC BLOOD PRESSURE: 160 MMHG | DIASTOLIC BLOOD PRESSURE: 83 MMHG

## 2023-12-10 DIAGNOSIS — S90.31XA CONTUSION OF RIGHT FOOT: ICD-10-CM

## 2023-12-10 PROCEDURE — 73630 X-RAY EXAM OF FOOT: CPT | Mod: RT

## 2023-12-10 PROCEDURE — 99284 EMERGENCY DEPT VISIT MOD MDM: CPT | Mod: 25

## 2023-12-10 PROCEDURE — 250N000013 HC RX MED GY IP 250 OP 250 PS 637: Performed by: PHYSICIAN ASSISTANT

## 2023-12-10 PROCEDURE — 99284 EMERGENCY DEPT VISIT MOD MDM: CPT | Performed by: PHYSICIAN ASSISTANT

## 2023-12-10 RX ORDER — HYDROCODONE BITARTRATE AND ACETAMINOPHEN 5; 325 MG/1; MG/1
1 TABLET ORAL ONCE
Status: COMPLETED | OUTPATIENT
Start: 2023-12-10 | End: 2023-12-10

## 2023-12-10 RX ADMIN — HYDROCODONE BITARTRATE AND ACETAMINOPHEN 1 TABLET: 5; 325 TABLET ORAL at 14:10

## 2023-12-10 NOTE — DISCHARGE INSTRUCTIONS
It was a pleasure working with you today!  I hope your condition improves rapidly!     Thankfully, the x-ray did not show any sign of fracture.  I would wrap your foot with the Ace bandage on a regular basis for the next week just to help with compression.  This also helps with pain.  Elevate your foot is much as possible over the next couple days.  Use the postop shoe for support and protection of the foot.  It is okay to use ibuprofen 600 mg every 6 hours needed for discomfort and/or Tylenol 650 mg every 6 hours as needed for pain

## 2023-12-10 NOTE — ED PROVIDER NOTES
History     Chief Complaint   Patient presents with    Foot Injury     HPI  Brittney Moon is a 47 year old female who presents for evaluation of right foot discomfort.  She was pulling a baking dish out and it fell and hit her foot.  Immediate onset bruise.  Pain is rated 8 on a scale of 10.  She is concerned about fracture.  Requesting something for pain.  She did not take anything at home.  She does have a history of chronic pain.  Denies any change in sensation.  Denies any laceration.  No bleeding.    Allergies:  Allergies   Allergen Reactions    Food Anaphylaxis     cilantro    Codeine Other (See Comments)    Hydromorphone Nausea and Vomiting    Coriandrum Sativum Rash       Problem List:    Patient Active Problem List    Diagnosis Date Noted    Complex regional pain syndrome i of left lower limb 07/29/2022     Priority: Medium    Opioid dependence (H) 07/29/2022     Priority: Medium    Chronic low back pain 08/23/2021     Priority: Medium    Inability to bear weight 02/11/2021     Priority: Medium    Fall at home, initial encounter 01/06/2020     Priority: Medium    Left hip pain 01/06/2020     Priority: Medium    Ganglion cyst 11/19/2018     Priority: Medium    Type 2 diabetes mellitus without complication, without long-term current use of insulin (H) 10/21/2018     Priority: Medium    Morbid obesity (H) 10/21/2018     Priority: Medium    Amputation of leg (H) 11/17/2017     Priority: Medium     Formatting of this note might be different from the original.  Overview:   15 surgeries; following club foot repair and osteomyelitis as childe      History of right knee joint replacement 11/17/2017     Priority: Medium    Anxiety disorder due to medical condition 08/03/2010     Priority: Medium        Past Medical History:    Past Medical History:   Diagnosis Date    Other convulsions     Unspecified osteomyelitis, lower leg        Past Surgical History:    Past Surgical History:   Procedure Laterality Date     COLONOSCOPY N/A 10/18/2022    Procedure: COLONOSCOPY, WITH POLYPECTOMY;  Surgeon: Micah Reno MD;  [facility]    EXCISE GANGLION WRIST Left 12/24/2018    Procedure: EXCISION LEFT WRIST GANGLION CYST;  Surgeon: Kehinde Pino DO;  [facility]    INJECT FACET JOINT Bilateral 11/29/2019    Procedure: lumbar sacral facet injection bilateral 4-5 sacral 1;  Surgeon: Canelo Zamora MD;  [facility]    ZZC AMPUTATION LOW LEG THRU TIB/FIB      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    Social History:  Marital Status:  Single [1]  Social History     Tobacco Use    Smoking status: Never    Smokeless tobacco: Never   Vaping Use    Vaping Use: Never used   Substance Use Topics    Alcohol use: Yes     Comment: rare    Drug use: No        Medications:    acetaminophen (TYLENOL) 500 MG tablet  cyanocobalamin (VITAMIN B-12) 1000 MCG tablet  FLUoxetine (PROZAC) 40 MG capsule  gabapentin (NEURONTIN) 300 MG capsule  HYDROcodone-acetaminophen (NORCO) 5-325 MG tablet  ketorolac (TORADOL) 10 MG tablet  metFORMIN (GLUCOPHAGE XR) 500 MG 24 hr tablet  methocarbamol (ROBAXIN) 500 MG tablet  simvastatin (ZOCOR) 5 MG tablet  SUMAtriptan (IMITREX) 25 MG tablet  topiramate (TOPAMAX) 100 MG tablet  topiramate (TOPAMAX) 50 MG tablet  traZODone (DESYREL) 100 MG tablet  Vitamin D3 (CHOLECALCIFEROL) 25 mcg (1000 units) tablet          Review of Systems   All other systems reviewed and are negative.      Physical Exam   BP: (!) 160/83  Pulse: 68  Resp: 18  SpO2: 97 %      Physical Exam  Vitals and nursing note reviewed.   Constitutional:       General: She is not in acute distress.     Appearance: She is not diaphoretic.   HENT:      Head: Normocephalic and atraumatic.      Right Ear: External ear normal.      Left Ear: External ear normal.      Nose: Nose normal.      Mouth/Throat:      Pharynx: No oropharyngeal exudate.   Eyes:      General: No scleral  icterus.        Right eye: No discharge.         Left eye: No discharge.      Conjunctiva/sclera: Conjunctivae normal.      Pupils: Pupils are equal, round, and reactive to light.   Neck:      Thyroid: No thyromegaly.   Cardiovascular:      Rate and Rhythm: Normal rate and regular rhythm.      Heart sounds: Normal heart sounds. No murmur heard.  Pulmonary:      Effort: Pulmonary effort is normal. No respiratory distress.      Breath sounds: Normal breath sounds. No wheezing or rales.   Chest:      Chest wall: No tenderness.   Abdominal:      General: Bowel sounds are normal. There is no distension.      Palpations: Abdomen is soft. There is no mass.      Tenderness: There is no abdominal tenderness. There is no guarding or rebound.   Musculoskeletal:      Cervical back: Normal range of motion and neck supple.      Comments: Right dorsal foot with a contusion measuring 4 cm on the dorsal foot centered on the distal third metatarsal.  Tender to palpation.  No break in the skin.  No laceration.  Normal range of motion of the toes.  Cap refill less than 2 seconds.   Lymphadenopathy:      Cervical: No cervical adenopathy.   Skin:     General: Skin is warm and dry.      Capillary Refill: Capillary refill takes less than 2 seconds.      Findings: No erythema or rash.   Neurological:      Mental Status: She is alert and oriented to person, place, and time.      Cranial Nerves: No cranial nerve deficit.   Psychiatric:         Behavior: Behavior normal.         Thought Content: Thought content normal.         ED Course                 Procedures              Critical Care time:  none               Results for orders placed or performed during the hospital encounter of 12/10/23 (from the past 24 hour(s))   Foot  XR, G/E 3 views, right    Narrative    EXAM: XR FOOT RIGHT G/E 3 VIEWS  LOCATION: [facility]  DATE: 12/10/2023    INDICATION: Right foot pain and swelling.  COMPARISON: 12/12/2022       Impression    IMPRESSION: New moderate to severe focal soft tissue swelling along the dorsum of the distal foot. Progressive or new milder ankle and hindfoot soft tissue swelling. No acute displaced right foot fracture or dislocation. Joint spaces are unchanged.   Dorsal spur talar Tiny heel spur.       Medications   HYDROcodone-acetaminophen (NORCO) 5-325 MG per tablet 1 tablet (1 tablet Oral $Given 12/10/23 1410)       Assessments & Plan (with Medical Decision Making)  Contusion of right foot     47 year old female presents for evaluation of right foot injury.  A baking dish fell and hit her dorsal foot.  Immediate onset bruise.  Requesting something for pain.  I did give her 1 hydrocodone.  She has a history of chronic pain.  On exam she has a contusion dorsally but no break in the skin.  CMS intact.  X-ray displays no evidence for fracture.  I performed an independent review of this x-ray and agree with the findings.  I printed a copy of the x-ray and gave to the patient.  Discussed contusion diagnosis.  Rest, ice, compression, and elevation discussed.  We wrapped her foot with an Ace bandage.  Was given a postop shoe for support and protection.  Proper dosing for ibuprofen and Tylenol discussed.  She can use her home pain medication for any breakthrough discomfort.  She was in agreement and was suitable for discharge.  A family member came to pick her up.     I have reviewed the nursing notes.    I have reviewed the findings, diagnosis, plan and need for follow up with the patient.           Medical Decision Making  The patient's presentation was of moderate complexity (an acute complicated injury).    The patient's evaluation involved:  ordering and/or review of 1 test(s) in this encounter (see separate area of note for details)    The patient's management necessitated moderate risk (prescription drug management including medications given in the ED).        New Prescriptions    No medications on file        Final diagnoses:   Contusion of right foot       Disclaimer: This note consists of symbols derived from keyboarding, dictation and/or voice recognition software. As a result, there may be errors in the script that have gone undetected. Please consider this when interpreting information found in this chart.      12/10/2023   [facility] EMERGENCY DEPT       Riccardo Robbins PA-C  12/10/23 [id number]

## 2023-12-10 NOTE — ED TRIAGE NOTES
Dropped canister of flour on right foot.      Triage Assessment (Adult)       Row Name 12/10/23 4032          Triage Assessment    Airway WDL WDL        Respiratory WDL    Respiratory WDL WDL        Peripheral/Neurovascular WDL    Peripheral Neurovascular WDL WDL

## 2023-12-11 DIAGNOSIS — F40.240 CLAUSTROPHOBIA: Primary | ICD-10-CM

## 2023-12-11 NOTE — TELEPHONE ENCOUNTER
Reason for Call:  Other call back    Detailed comments: because pt has neurotransmitter in back / radiology asked that she reach out to provider who ordered CT/ MRI to prescribe prescription for pt as she is claustrophobic. She is scheduled for CT 3/6/24. Please advise     Phone Number Patient can be reached at: Home number on file [phone] (home)    Best Time: any    Can we leave a detailed message on this number? YES    Call taken on 12/11/2023 at 4:35 PM by Mahsa Mejia

## 2023-12-12 NOTE — TELEPHONE ENCOUNTER
CT scheduled for 3/6/24 was ordered by Dr. Starr.     Is patient referring to MRI ordered by Deysi Gallegos NP?    Attempted to reach out to patient, no answer. Left voice message for them to call clinic back to further discuss.

## 2023-12-12 NOTE — TELEPHONE ENCOUNTER
Called and LVM    Ok to schedule next available with Dr. Dc or Dr. Cruz in a new neuro spot     [facility] Communication Facilitator on 12/12/2023 at 10:51 AM

## 2023-12-13 ENCOUNTER — TELEPHONE (OUTPATIENT)
Dept: NEUROSURGERY | Facility: CLINIC | Age: 48
End: 2023-12-13
Payer: COMMERCIAL

## 2023-12-13 RX ORDER — DIAZEPAM 5 MG
5 TABLET ORAL SEE ADMIN INSTRUCTIONS
Qty: 2 TABLET | Refills: 0 | Status: SHIPPED | OUTPATIENT
Start: 2023-12-13 | End: 2024-03-07

## 2023-12-13 NOTE — TELEPHONE ENCOUNTER
[facility] schedule follow up with Dr. Echols after brain MRI which is scheduled on 12/19.

## 2023-12-13 NOTE — TELEPHONE ENCOUNTER
Oral sedation pended.     Per last OV note from Deysi Gallegos NP, patient should follow up in clinic with Dr. Echols when MRI has been completed.      Called patient to discuss, reviewed administration instructions. Patient will have a . She will contact us once imaging is completed to obtain.     Staff message sent to scheduling to coordinate follow-up with Dr. Echols for after MRI.     Sedation routed to provider for sign off.

## 2023-12-13 NOTE — TELEPHONE ENCOUNTER
Patient returned call and left  on RN line this AM.     She is requesting oral sedation for her MRI ordered by Deysi Gallegos NP and scheduled for 12/19.

## 2023-12-19 ENCOUNTER — TRANSFERRED RECORDS (OUTPATIENT)
Dept: HEALTH INFORMATION MANAGEMENT | Facility: CLINIC | Age: 48
End: 2023-12-19
Payer: COMMERCIAL

## 2023-12-26 ENCOUNTER — TELEPHONE (OUTPATIENT)
Dept: FAMILY MEDICINE | Facility: OTHER | Age: 48
End: 2023-12-26
Payer: COMMERCIAL

## 2023-12-26 ENCOUNTER — MEDICAL CORRESPONDENCE (OUTPATIENT)
Dept: HEALTH INFORMATION MANAGEMENT | Facility: CLINIC | Age: 48
End: 2023-12-26
Payer: COMMERCIAL

## 2023-12-26 NOTE — TELEPHONE ENCOUNTER
INCOMING FORMS    Sender: [employer]    Type of Form, letter or note (What is requested?): wheelchair rx for year    How was the form received?: Fax    How should forms be returned?:  Fax : [phone]    Form placed in OOO bin for review/signature if appropriate.

## 2024-01-11 ENCOUNTER — OFFICE VISIT (OUTPATIENT)
Dept: NEUROSURGERY | Facility: CLINIC | Age: 49
End: 2024-01-11
Payer: COMMERCIAL

## 2024-01-11 VITALS
OXYGEN SATURATION: 97 % | RESPIRATION RATE: 18 BRPM | SYSTOLIC BLOOD PRESSURE: 110 MMHG | TEMPERATURE: 98.6 F | HEART RATE: 70 BPM | DIASTOLIC BLOOD PRESSURE: 62 MMHG

## 2024-01-11 DIAGNOSIS — Q04.6 COLLOID CYST OF BRAIN (H): Primary | ICD-10-CM

## 2024-01-11 PROCEDURE — 99213 OFFICE O/P EST LOW 20 MIN: CPT | Performed by: NEUROLOGICAL SURGERY

## 2024-01-11 ASSESSMENT — PAIN SCALES - GENERAL: PAINLEVEL: SEVERE PAIN (7)

## 2024-01-11 NOTE — PROGRESS NOTES
Brittney Moon is a 47 year old female with a history of migraines that presents to the clinic after an ED visit for headache and left-sided pain after a fall. Imaging at that time revealed no acute traumatic injury, however an incidental small colloid cyst in the region of the foramen of Monro was identified. She was referred to neurology and was recommended to consult with neurosurgery as well as follow up with head CT in 6 months. Today she reports 6 months of headache which is different from her usual migraine symptoms. She describes pain on the right side of her head as well as the back of her head. She describes issues with short term memory loss. 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 She is unable to determine if there is any vision changes otherwise.  She reports anahy difficulty with balance due to prior injury. She inquires about next steps.     Returns for follow up.  No change in headaches and symptoms.  MR Brain, personally reviewed, shows small 1 mm likely colloid cyst, stable from prior CT, with no hydrocephalus.    Past Medical History:   Diagnosis Date    Other convulsions     seizure disorder    Unspecified osteomyelitis, lower leg        Past Medical History reviewed with patient during visit.    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    Current  Outpatient Medications   Medication    acetaminophen (TYLENOL) 500 MG tablet    cyanocobalamin (VITAMIN B-12) 1000 MCG tablet    diazepam (VALIUM) 5 MG tablet    FLUoxetine (PROZAC) 40 MG capsule    gabapentin (NEURONTIN) 300 MG capsule    HYDROcodone-acetaminophen (NORCO) 5-325 MG tablet    ketorolac (TORADOL) 10 MG tablet    metFORMIN (GLUCOPHAGE XR) 500 MG 24 hr tablet    methocarbamol (ROBAXIN) 500 MG tablet    simvastatin (ZOCOR) 5 MG tablet    SUMAtriptan (IMITREX) 25 MG tablet    topiramate (TOPAMAX) 100 MG tablet    topiramate (TOPAMAX) 50 MG tablet    traZODone (DESYREL) 100 MG tablet    Vitamin D3 (CHOLECALCIFEROL) 25 mcg (1000 units) tablet     No current facility-administered medications for this visit.       Allergies   Allergen Reactions    Food Anaphylaxis     cilantro    Codeine Other (See Comments)    Hydromorphone Nausea and Vomiting    Coriandrum Sativum Rash       Social History     Socioeconomic History    Marital status: Single     Spouse name: None    Number of children: None    Years of education: None    Highest education level: None   Tobacco Use    Smoking status: Never    Smokeless tobacco: Never   Vaping Use    Vaping Use: Never used   Substance and Sexual Activity    Alcohol use: Yes     Comment: rare    Drug use: No    Sexual activity: Never     Social Determinants of Health     Financial Resource Strain: Low Risk  (10/10/2023)    Financial Resource Strain     Within the past 12 months, have you or your family members you live with been unable to get utilities (heat, electricity) when it was really needed?: No   Food Insecurity: Low Risk  (10/10/2023)    Food Insecurity     Within the past 12 months, did you worry that your food would run out before you got money to buy more?: No     Within the past 12 months, did the food you bought just not last and you didn t have money to get more?: No   Transportation Needs: Low Risk  (10/10/2023)    Transportation Needs     Within the past 12  months, has lack of transportation kept you from medical appointments, getting your medicines, non-medical meetings or appointments, work, or from getting things that you need?: No   Interpersonal Safety: Low Risk  (10/10/2023)    Interpersonal Safety     Do you feel physically and emotionally safe where you currently live?: Yes     Within the past 12 months, have you been hit, slapped, kicked or otherwise physically hurt by someone?: No     Within the past 12 months, have you been humiliated or emotionally abused in other ways by your partner or ex-partner?: No   Housing Stability: Low Risk  (10/10/2023)    Housing Stability     Do you have housing? : Yes     Are you worried about losing your housing?: No       No family history on file.       ROS: 10 point ROS neg other than the symptoms noted above in the HPI.    Vital Signs: /62   Pulse 70   Temp 98.6  F (37  C) (Temporal)   Resp 18   LMP  (LMP Unknown)   SpO2 97%     Examination:  Constitutional:  Alert, well nourished, NAD.  HEENT: Normocephalic, atraumatic.   Pulm:  Without shortness of breath or audible adventitious respiratory sounds.  CV:  No pitting edema of RLE. LLE AKA. Brisk capillary refill, CMS intact.    Neurological:  Awake  Alert  Oriented x 3  Speech clear  Cranial nerves II - XII intact  PERRL  EOMI  Face symmetric  No new weakness    Assessment/Plan:   Colloid cyst    Reviewed MRI  Plan for repeat in 1 year

## 2024-01-11 NOTE — PATIENT INSTRUCTIONS
Ordered a repeat brain MRI in 1 year at [facility]. We will call you with the results. If you don't hear from us 7 days after the scan, please call us.    [facility]  [facility] - [address] [address]  Telephone:  [phone]       Fax:  [phone]

## 2024-01-11 NOTE — LETTER
1/11/2024         RE: Brittney Moon  [address]  [address]  [place] [address]        Dear Colleague,    Thank you for referring your patient, Brittney Moon, to the [facility] [place]. Please see a copy of my visit note below.    Confirmed with patient after there visit she would like the MRI at [facility]. Will fax tomorrow.    Radha Amin RN on 1/11/2024 at 4:21 PM      Brittney Moon is a 47 year old female with a history of migraines that presents to the clinic after an ED visit for headache and left-sided pain after a fall. Imaging at that time revealed no acute traumatic injury, however an incidental small colloid cyst in the region of the foramen of Monro was identified. She was referred to neurology and was recommended to consult with neurosurgery as well as follow up with head CT in 6 months. Today she reports 6 months of headache which is different from her usual migraine symptoms. She describes pain on the right side of her head as well as the back of her head. She describes issues with short term memory loss. 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 She is unable to determine if there is any vision changes otherwise.  She reports anahy difficulty with balance due to prior injury. She inquires about next steps.     Returns for follow up.  No change in headaches and symptoms.  MR Brain, personally reviewed, shows small 1 mm likely colloid cyst, stable from prior CT, with no hydrocephalus.    Past Medical History:   Diagnosis Date     Other convulsions     seizure disorder     Unspecified osteomyelitis, lower leg        Past Medical History reviewed with patient during visit.    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    Current Outpatient Medications   Medication     acetaminophen (TYLENOL) 500 MG tablet     cyanocobalamin (VITAMIN B-12) 1000 MCG tablet     diazepam (VALIUM) 5 MG tablet     FLUoxetine (PROZAC) 40 MG capsule     gabapentin (NEURONTIN) 300 MG capsule     HYDROcodone-acetaminophen (NORCO) 5-325 MG tablet     ketorolac (TORADOL) 10 MG tablet     metFORMIN (GLUCOPHAGE XR) 500 MG 24 hr tablet     methocarbamol (ROBAXIN) 500 MG tablet     simvastatin (ZOCOR) 5 MG tablet     SUMAtriptan (IMITREX) 25 MG tablet     topiramate (TOPAMAX) 100 MG tablet     topiramate (TOPAMAX) 50 MG tablet     traZODone (DESYREL) 100 MG tablet     Vitamin D3 (CHOLECALCIFEROL) 25 mcg (1000 units) tablet     No current facility-administered medications for this visit.       Allergies   Allergen Reactions     Food Anaphylaxis     cilantro     Codeine Other (See Comments)     Hydromorphone Nausea and Vomiting     Coriandrum Sativum Rash       Social History     Socioeconomic History     Marital status: Single     Spouse name: None     Number of children: None     Years of education: None     Highest education level: None   Tobacco Use     Smoking status: Never     Smokeless tobacco: Never   Vaping Use     Vaping Use: Never used   Substance and Sexual Activity     Alcohol use: Yes     Comment: rare     Drug use: No     Sexual activity: Never     Social Determinants of Health     Financial Resource Strain: Low Risk  (10/10/2023)    Financial Resource Strain      Within the past 12 months, have you or your family members you live with been unable to  get utilities (heat, electricity) when it was really needed?: No   Food Insecurity: Low Risk  (10/10/2023)    Food Insecurity      Within the past 12 months, did you worry that your food would run out before you got money to buy more?: No      Within the past 12 months, did the food you bought just not last and you didn t have money to get more?: No   Transportation Needs: Low Risk  (10/10/2023)    Transportation Needs      Within the past 12 months, has lack of transportation kept you from medical appointments, getting your medicines, non-medical meetings or appointments, work, or from getting things that you need?: No   Interpersonal Safety: Low Risk  (10/10/2023)    Interpersonal Safety      Do you feel physically and emotionally safe where you currently live?: Yes      Within the past 12 months, have you been hit, slapped, kicked or otherwise physically hurt by someone?: No      Within the past 12 months, have you been humiliated or emotionally abused in other ways by your partner or ex-partner?: No   Housing Stability: Low Risk  (10/10/2023)    Housing Stability      Do you have housing? : Yes      Are you worried about losing your housing?: No       No family history on file.       ROS: 10 point ROS neg other than the symptoms noted above in the HPI.    Vital Signs: /62   Pulse 70   Temp 98.6  F (37  C) (Temporal)   Resp 18   LMP  (LMP Unknown)   SpO2 97%     Examination:  Constitutional:  Alert, well nourished, NAD.  HEENT: Normocephalic, atraumatic.   Pulm:  Without shortness of breath or audible adventitious respiratory sounds.  CV:  No pitting edema of RLE. LLE AKA. Brisk capillary refill, CMS intact.    Neurological:  Awake  Alert  Oriented x 3  Speech clear  Cranial nerves II - XII intact  PERRL  EOMI  Face symmetric  No new weakness    Assessment/Plan:   Colloid cyst    Reviewed MRI  Plan for repeat in 1 year      Again, thank you for allowing me to participate in the care of your patient.         Sincerely,        Byron Echols MD

## 2024-01-11 NOTE — PROGRESS NOTES
Confirmed with patient after there visit she would like the MRI at [facility]. Will fax tomorrow.    Radha Amin RN on 1/11/2024 at 4:21 PM

## 2024-01-11 NOTE — NURSING NOTE
"Brittney Moon is a 48 year old female who presents for:  Chief Complaint   Patient presents with    Results     MRI        Initial Vitals:  /62   Pulse 70   Resp 18   LMP  (LMP Unknown)   SpO2 97%  Estimated body mass index is 50.49 kg/m  as calculated from the following:    Height as of 10/10/23: 4' 11\" (1.499 m).    Weight as of 10/10/23: 250 lb (113.4 kg).. There is no height or weight on file to calculate BSA. BP completed using cuff size: regular  Severe Pain (7)    Nursing Comments:     Tanja Ho    "

## 2024-01-12 NOTE — PROGRESS NOTES
Faxed MRI order to [facility] at [phone]. Verified via RightFax.    Radha Amin RN on 1/12/2024 at 12:33 PM

## 2024-02-18 ENCOUNTER — MYC MEDICAL ADVICE (OUTPATIENT)
Dept: FAMILY MEDICINE | Facility: OTHER | Age: 49
End: 2024-02-18
Payer: COMMERCIAL

## 2024-02-18 DIAGNOSIS — E11.9 TYPE 2 DIABETES MELLITUS WITHOUT COMPLICATION, WITHOUT LONG-TERM CURRENT USE OF INSULIN (H): Primary | ICD-10-CM

## 2024-02-18 DIAGNOSIS — E66.01 MORBID OBESITY (H): ICD-10-CM

## 2024-02-18 DIAGNOSIS — S88.919A AMPUTATION OF LEG (H): ICD-10-CM

## 2024-02-20 ENCOUNTER — TELEPHONE (OUTPATIENT)
Dept: FAMILY MEDICINE | Facility: OTHER | Age: 49
End: 2024-02-20
Payer: COMMERCIAL

## 2024-02-20 NOTE — TELEPHONE ENCOUNTER
Patient Quality Outreach    Patient is due for the following:   Diabetes -  Eye Exam    Next Steps:   No follow up needed at this time. Does not need a Diabetes recheck until April.    Type of outreach:    Sent [employer] message.      Questions for provider review:    None           Anat Mathew, CMA

## 2024-03-06 DIAGNOSIS — M62.830 SPASM OF BACK MUSCLES: ICD-10-CM

## 2024-03-06 RX ORDER — METHOCARBAMOL 500 MG/1
TABLET, FILM COATED ORAL
Qty: 120 TABLET | Refills: 3 | Status: SHIPPED | OUTPATIENT
Start: 2024-03-06 | End: 2024-07-05

## 2024-03-07 ENCOUNTER — LAB (OUTPATIENT)
Dept: LAB | Facility: CLINIC | Age: 49
End: 2024-03-07
Payer: COMMERCIAL

## 2024-03-07 ENCOUNTER — OFFICE VISIT (OUTPATIENT)
Dept: SURGERY | Facility: CLINIC | Age: 49
End: 2024-03-07
Payer: COMMERCIAL

## 2024-03-07 VITALS
WEIGHT: 293 LBS | SYSTOLIC BLOOD PRESSURE: 128 MMHG | BODY MASS INDEX: 57.52 KG/M2 | DIASTOLIC BLOOD PRESSURE: 24 MMHG | HEIGHT: 60 IN

## 2024-03-07 DIAGNOSIS — E66.01 MORBID OBESITY (H): ICD-10-CM

## 2024-03-07 DIAGNOSIS — E11.9 TYPE 2 DIABETES MELLITUS WITHOUT COMPLICATION, WITHOUT LONG-TERM CURRENT USE OF INSULIN (H): ICD-10-CM

## 2024-03-07 DIAGNOSIS — S88.919A AMPUTATION OF LEG (H): ICD-10-CM

## 2024-03-07 LAB
ERYTHROCYTE [DISTWIDTH] IN BLOOD BY AUTOMATED COUNT: 12.9 % (ref 10–15)
HBA1C MFR BLD: 5.9 % (ref 0–5.6)
HCT VFR BLD AUTO: 43.4 % (ref 35–47)
HGB BLD-MCNC: 13.8 G/DL (ref 11.7–15.7)
MCH RBC QN AUTO: 29.2 PG (ref 26.5–33)
MCHC RBC AUTO-ENTMCNC: 31.8 G/DL (ref 31.5–36.5)
MCV RBC AUTO: 92 FL (ref 78–100)
PLATELET # BLD AUTO: 305 10E3/UL (ref 150–450)
RBC # BLD AUTO: 4.72 10E6/UL (ref 3.8–5.2)
WBC # BLD AUTO: 9.7 10E3/UL (ref 4–11)

## 2024-03-07 PROCEDURE — 80061 LIPID PANEL: CPT

## 2024-03-07 PROCEDURE — 82306 VITAMIN D 25 HYDROXY: CPT

## 2024-03-07 PROCEDURE — 80053 COMPREHEN METABOLIC PANEL: CPT

## 2024-03-07 PROCEDURE — 82728 ASSAY OF FERRITIN: CPT

## 2024-03-07 PROCEDURE — 82746 ASSAY OF FOLIC ACID SERUM: CPT

## 2024-03-07 PROCEDURE — 99000 SPECIMEN HANDLING OFFICE-LAB: CPT

## 2024-03-07 PROCEDURE — 84443 ASSAY THYROID STIM HORMONE: CPT

## 2024-03-07 PROCEDURE — 83036 HEMOGLOBIN GLYCOSYLATED A1C: CPT

## 2024-03-07 PROCEDURE — 84425 ASSAY OF VITAMIN B-1: CPT | Mod: 90

## 2024-03-07 PROCEDURE — 85027 COMPLETE CBC AUTOMATED: CPT

## 2024-03-07 PROCEDURE — 84590 ASSAY OF VITAMIN A: CPT | Mod: 90

## 2024-03-07 PROCEDURE — 99205 OFFICE O/P NEW HI 60 MIN: CPT | Performed by: NURSE PRACTITIONER

## 2024-03-07 PROCEDURE — 82607 VITAMIN B-12: CPT

## 2024-03-07 PROCEDURE — 36415 COLL VENOUS BLD VENIPUNCTURE: CPT

## 2024-03-07 PROCEDURE — 84630 ASSAY OF ZINC: CPT | Mod: 90

## 2024-03-07 PROCEDURE — 83970 ASSAY OF PARATHORMONE: CPT

## 2024-03-07 ASSESSMENT — SLEEP AND FATIGUE QUESTIONNAIRES
HOW LIKELY ARE YOU TO NOD OFF OR FALL ASLEEP WHILE LYING DOWN TO REST IN THE AFTERNOON WHEN CIRCUMSTANCES PERMIT: HIGH CHANCE OF DOZING
HOW LIKELY ARE YOU TO NOD OFF OR FALL ASLEEP WHILE WATCHING TV: SLIGHT CHANCE OF DOZING
HOW LIKELY ARE YOU TO NOD OFF OR FALL ASLEEP WHEN YOU ARE A PASSENGER IN A CAR FOR AN HOUR WITHOUT A BREAK: SLIGHT CHANCE OF DOZING
HOW LIKELY ARE YOU TO NOD OFF OR FALL ASLEEP WHILE SITTING INACTIVE IN A PUBLIC PLACE: WOULD NEVER DOZE
HOW LIKELY ARE YOU TO NOD OFF OR FALL ASLEEP IN A CAR, WHILE STOPPED FOR A FEW MINUTES IN TRAFFIC: WOULD NEVER DOZE
HOW LIKELY ARE YOU TO NOD OFF OR FALL ASLEEP WHILE SITTING QUIETLY AFTER LUNCH WITHOUT ALCOHOL: WOULD NEVER DOZE
HOW LIKELY ARE YOU TO NOD OFF OR FALL ASLEEP WHILE SITTING AND TALKING TO SOMEONE: WOULD NEVER DOZE
HOW LIKELY ARE YOU TO NOD OFF OR FALL ASLEEP WHILE SITTING AND READING: SLIGHT CHANCE OF DOZING

## 2024-03-07 NOTE — LETTER
"    3/7/2024         RE: Brittney Moon  [address]  [address]  [place] [address]        Dear Colleague,    Thank you for referring your patient, Brittney Moon, to the [facility] [place]. Please see a copy of my visit note below.    New Bariatric Surgery Consultation Note    2024    RE: Brittney Moon  MR#: [id number]  : 1975      Referring provider:       3/7/2024     2:53 PM   --   Who referred you obinna larios       Chief Complaint/Reason for visit: evaluation for possible weight loss surgery    Dear Cyndee, Obinna PALMER PA-C (General),    I had the pleasure of seeing your patient, Brittney Moon, to evaluate her obesity and consider her for possible weight loss surgery. As you know, Brittney Moon is 48 year old.  She has a height of 4' 11.5\", a weight of 294 lbs 3.2 oz, and calculated Body mass index is 58.43 kg/m .        HISTORY OF PRESENT ILLNESS:      3/7/2024     2:53 PM   Weight Loss History Reviewed with Patient   How long have you been overweight? Since late teens through early 20's   What is the most that you have ever weighed 406   What is the most weight you have lost? 150   I have tried the following methods to lose weight Watching portions or calories    Exercise    Weight Watchers    Kacey Marquez    Pre packaged meals ex: Nutrisystem   I have tried the following weight loss medications? (Check all that apply) Topamax/Topiramate    Metformin       CO-MORBIDITIES OF OBESITY INCLUDE:      3/7/2024     2:53 PM   --   I have the following health issues associated with obesity Type II Diabetes    Osteoarthritis (joint disease)       PAST MEDICAL HISTORY:  Past Medical History:   Diagnosis Date     Other convulsions     seizure disorder     Unspecified osteomyelitis, lower leg        PAST SURGICAL HISTORY:  Past Surgical History:   Procedure Laterality Date     COLONOSCOPY N/A 10/18/2022    Procedure: COLONOSCOPY, WITH POLYPECTOMY;  " Surgeon: Micah Reno MD;  Location: [facility]     EXCISE GANGLION WRIST Left 12/24/2018    Procedure: EXCISION LEFT WRIST GANGLION CYST;  Surgeon: Kehinde Pino DO;  Location: [facility]     INJECT FACET JOINT Bilateral 11/29/2019    Procedure: lumbar sacral facet injection bilateral 4-5 sacral 1;  Surgeon: Canelo Zamora MD;  Location: [facility]     ZZC AMPUTATION LOW LEG THRU TIB/FIB      Below knee amputation secondary to osteomyelitis       FAMILY HISTORY:   No family history on file.    SOCIAL HISTORY:       3/7/2024     2:53 PM   Social History Questions Reviewed With Patient   Which best describes your employment status (select all that apply) I work nights   If you work, what is your occupation? [employer]   Which best describes your marital status single   Who do you have in your support network that can be available to help you for the first 2 weeks after surgery? immediate family   Who can you count on for support throughout your weight loss surgery journey? immediate family, close friends       HABITS:      3/7/2024     2:53 PM   --   How often do you drink alcohol? Never   Do you currently use any of the following Nicotine products? No   Have you ever used any of the following nicotine products? No   Have you or are you currently using street drugs or prescription strength medication for which you do not have a prescription for? No   Do you have a history of chemical dependency (alcohol or drug abuse)? No       PSYCHOLOGICAL HISTORY:       3/7/2024     2:53 PM   Psychological History Reviewed With Patient   Have you ever attempted suicide? More than 10 years ago.   Have you had thoughts of suicide in the past year? No   Have you ever been hospitalized for mental illness or a suicide attempt? More than 10 years ago.   Do you have a history of chronic pain? Yes   Have you ever been diagnosed with fibromyalgia? No   Are you currently being treated for any of the following?  (select all that apply) Depression    Anxiety   Are you currently seeing a therapist or counselor? Yes   Are you currently seeing a psychiatrist? Yes       ROS:      3/7/2024     2:53 PM   --   Skin None of the above   HEENT Headaches    Missing teeth   If you answered yes to missing teeth, please indicate how many 1   Musculoskeletal Back pain    Limited mobility    Arthritis   Cardiovascular None of the above   Pulmonary Snoring    People have told me I stop breathing while asleep   Gastrointestinal None of the above   Genitourinary Kidney stones   Hematological None of the above   Neurological Migraine headaches   Female only Post-menopausal       EATING BEHAVIORS:      3/7/2024     2:53 PM   --   Have you or anyone else thought that you had an eating disorder? No   Do you currently binge eat (eat a large amount of food in a short time)? No   Are you an emotional eater? Yes   Do you get up to eat after falling asleep? No   Can you afford 3 meals a day? Yes   Can you afford 50-60 dollars a month for vitamins? Yes       EXERCISE:      3/7/2024     2:53 PM   --   How often do you exercise? Less than 1 time per week   What is the duration of your exercise (in minutes)? 30 Minutes   What types of exercise do you do? other   What keeps you from being more active? I am as active as I can possbily be    Pain    My ability to walk or move around is limited   Amputation of leg as child due to osteomyelitis with revision to AKA years later which made her unable to use a prosthetic making her wheelchair bound. She was able to use crutches prior to the weight gain and is able to perform ADLs and transfers independently, but this is getting more difficult with her current weight. She is seeking to maintain as much mobility as possible as long as possible and believes weight loss with aid her in this goal.    MEDICATIONS:  Current Outpatient Medications   Medication Sig Dispense Refill     acetaminophen (TYLENOL) 500 MG tablet  1 tablet as needed Orally every 6 hrs       cyanocobalamin (VITAMIN B-12) 1000 MCG tablet Take 1,000 mcg by mouth daily       FLUoxetine (PROZAC) 40 MG capsule Take 80 mg by mouth every morning       gabapentin (NEURONTIN) 300 MG capsule 1 cap (300mg) in the AM and 2 caps (600mg) at bed 90 capsule 3     HYDROcodone-acetaminophen (NORCO) 5-325 MG tablet 0.5 to 1 tablet as needed Orally (ok to fill 05/25/2023) every 8-12 hrs (max 2 tabs/day) for 30 days       metFORMIN (GLUCOPHAGE XR) 500 MG 24 hr tablet TAKE ONE TABLET BY MOUTH TWICE A DAY WITH MEALS 180 tablet 1     methocarbamol (ROBAXIN) 500 MG tablet TAKE ONE TABLET BY MOUTH FOUR TIMES A DAY AS NEEDED FOR MUSCLE SPASMS 120 tablet 3     simvastatin (ZOCOR) 5 MG tablet TAKE ONE TABLET (5MG) BY MOUTH AT BEDTIME 90 tablet 3     SUMAtriptan (IMITREX) 25 MG tablet TAKE ONE TABLET BY MOUTH AT ONSET OF HEADACHE FOR MIGRAINE, MAY REPEAT DOSE AFTER 2 HOURS IF NEEDED. DO NOT TAKE MORE THAN 200MG IN 24 HOURS. 18 tablet 1     topiramate (TOPAMAX) 100 MG tablet Take 100 mg by mouth 2 times daily Take with 50 mg for totally dose of 150 mg twice daily       topiramate (TOPAMAX) 50 MG tablet Take 50 mg by mouth 2 times daily Take with 100 mg tablet for total dose of 150 mg twice daily       traZODone (DESYREL) 100 MG tablet Take 100 mg by mouth At Bedtime       Vitamin D3 (CHOLECALCIFEROL) 25 mcg (1000 units) tablet Take 25 mcg by mouth daily       diazepam (VALIUM) 5 MG tablet Take 1 tablet (5 mg) by mouth See Admin Instructions Take 5mg 30 minutes prior to MRI, then may take an additional 5mg at time of exam if needed. MUST have a . 2 tablet 0     ketorolac (TORADOL) 10 MG tablet Take 10 mg by mouth every 6 hours as needed for moderate pain (4-6) (Patient not taking: Reported on 3/7/2024)         ALLERGIES:  Allergies   Allergen Reactions     Food Anaphylaxis     cilantro     Codeine Other (See Comments)     Hydromorphone Nausea and Vomiting     Coriandrum Sativum Rash  "      LABS/IMAGING/MEDICAL RECORDS REVIEW: reviewed and ordered  Mammogram in 2023  Colonoscopy in 2023  Pap Smear in 2022    PHYSICAL EXAM:  BP (!) 128/24   Ht 1.511 m (4' 11.5\")   Wt 133.4 kg (294 lb 3.2 oz)   LMP  (LMP Unknown)   BMI 58.43 kg/m    General Appearance  No acute distress. Obesity   Alert and Oriented   Ambulatory without a device  HEENT  PERRLA, EOMI; Melampati Score III  Neck  Stout; No carotid bruits or JVD  Cardiovascular  Regular rate and rhythm  No murmur or gallop  Pulmonary  LS CTA bilaterally  Abdomen  Soft, NT/ND, No rebound, no guarding  No rashes  Extremities:  Pitting edema: +4  Distal pulses: palpable on present extremities; did not palpate groin pulse on left leg with AKA  Neurologic  Cranial nerves grossly intact; no tremors present  Psychiatric  Thought content organized  Mood appears stable  Endocrine  Garces Facies not present  Dorsal Thoracic Prominence not present  Skin tags not present  Acanthosis nigricans not present  Purple Striae not present  Dermatologic  Intertrigo not present  Hirsutism present      In summary, Brittney Moon has type 2 DM and a history of osteomyelitis s/p left AKA in the setting of morbid obesity. She is seeking to increase her activity and health with weight loss. She is wheelchair bound due to left AKA secondary to osteomyelitis as a child and is unable to use crutches given her current weight. She is able to perform all ADL's and transfers from her chair independently and is afraid that she will not maintain this independence much longer at her current weight. Her goal is to increase her mobility with weight loss and maybe even regain the ability to use crutches to decrease her total dependence on her wheelchair.  Body mass index is 58.43 kg/m .  This satisfies NIH criteria for bariatric surgery. Once Brittney Moon has been cleared by our bariatric psychologist and our bariatric dietitian, completed initial lab work, attended one support group, " and satisfied insurance mandated visits if applicable, she  will be scheduled with  Dr. Sam for Bariatric Surgery Consultation.  Brittney Moon understands that routine health care maintenance will need to be up to date prior to surgery. she verbalizes understanding of the process to surgery and expectations for the postoperative period including the need for lifelong lifestyle changes, vitamin supplementation, and laboratory monitoring.       Weight will need to be 294 prior to submitting for insurance approval.  Standard DVT protocol  Sleep Study will be needed  A1c will need to be < 8 at the time of surgery        MARIA ANTONIA Olguin CNP  [phone]  [facility]     Total time spent on the date of this encounter doing: chart review, review of test results, patient visit, physical exam, education, counseling, developing plan of care, and documenting = 70 minutes.        Again, thank you for allowing me to participate in the care of your patient.        Sincerely,        MARIA ANTONIA Chamorro CNP

## 2024-03-07 NOTE — PATIENT INSTRUCTIONS
Welcome to [facility]!      We are grateful that you have chosen us to partner with you on your journey to better health!  We have included some very important information for you to read as you begin your journey towards weight loss surgery. We will discuss parts of this as you move closer to surgery but please reach out if you have any questions or concerns.      Please click on the following links and they will lead you to a printable version of each handout or copy into your browser to view/print at home:    Making Your Decision, Understanding Weight Loss Surgery  [email or web address]    A Roadmap to Your Weight Loss Surgery  [email or web address]    Honoring Choices - Your Rights  [email or web address]    Honoring Choices - [place] Health Care Directive (form that can be filled out)  [email or web address]      Insurance Coverage and Approval for Surgery  Every insurance plan is different.  Many companies approve benefits for surgery, but many have specific requirements that must be completed prior to being approved.    Verification of benefits is the patient's responsibility.  You will be responsible for any charges not covered by your insurance plan - including, but not limited to copays, deductible, out of pocket, any amount over your cap limit, etc.  Using the guideline below, please contact your insurance plan (we recommend you call the Customer Service number on the back of your card).      Once all of the requirements for surgery are complete, you will see the surgeon.  Following this visit, we will submit your information to your insurance plan for Prior Authorization.  We strongly encourage you to submit any documentation that supports your weight loss attempts to us.    Questions that are important to ask your insurance company when you call them:    Are [facility] in my network?  Is bariatric surgery  a covered benefit under my policy?  If so, what is my estimated out of pocket expense?  Are there any exclusions or cap limits on my plan for bariatric surgery?  If so, what are they?  What are the criteria necessary to be approved for bariatric surgery?  Do you require medically supervised weight loss visits for approval of surgery?  If yes, for how many months?  Within the last # of years?  Are the following procedures a covered benefit under my plan?  Laparoscopic Gastric Bypass (CPT Code 46200)  Laparoscopic Sleeve Gastrectomy (CPT Code 35428)  Nutrition/Dietitian Consult (CPT Code 15201  Nutrition/Dietitian Reassessment (CPT Code 87606  Psychological Assessment (CPT Codes 81593 & 34776      Initial labs for Bariatric Surgery    Some lab orders were placed by your doctor in the [facility] system and can be drawn at any of our outpatient hospital labs or your clinic. If you do them at one of three hospitals ([facility] in [place], [facility] in [place], [facility] in [place]) you do not need an appointment but if you'd like to do them at a clinic, you will need to schedule an appointment with that clinic.       You will need to be fasting 10-12 hours prior (you can continue to drink water) and should have them drawn sooner verses later so if any corrections need to be made we will have time to address them.      [facility] lab is open 7 am - 4:30 pm Monday through Friday and 9am-12:30 pm on Saturdays.  [facility]'s lab is open 7 am -4:30 pm Monday through Friday and 8am to 11:30am on Saturday and Sundays.     If you would like them done at a clinic outside the [facility] system, then we will need to know where to fax the orders.   If you have any questions or would like help scheduling a lab appointment at the [facility], please give us a call on the Bariatric Nurse Line at [phone].        [place] to [place] for Bariatric Surgery  Eat 3 nutrient-rich meals each day      Don't skip meals--it will cause you to overeat later in the day! Space meals 4-6 hours apart    Eat around the same times each day to develop a routine eating schedule    Avoid snacking unless physically hungry.   Planned snacks: 1-2 times per day and no more than 150 calories    Eating protein and fiber (vegetables/fruits/whole grains) with meals helps you stay full     Choose foods with less than 10 grams of sugar and 5-10 grams of fat per serving to prevent excess calories and weight gain  Eat protein first    Protein helps with healing, maintaining muscle mass and good nutrition, and reducing hunger     For RNY Gastric Bypass, Sleeve Gastrectomy, and Duodenal Switch: aim for 60-80 grams of protein per day    Eat protein first, then veggies and the fruits or grains  Stop drinking 15-30 minutes before meals and wait 30-45 minutes after eating to drink    Drinking too soon before a meal may cause you to be too full to eat    Drinking too soon after you eat will cause the food to  wash  through your new stomach too quickly--leaving you hungry and likely to eat more    Eat [id number]    Take 20-30 minutes to eat each meal by taking small bites, chewing foods to applesauce consistency or 20-30 times before you swallow    Not chewing food properly can block the opening of your pouch--causing pain, nausea, vomiting    Eating foods too fast can delay those full signals and increase overeating   Slow down your eating by smaller plates/bowls, toddler utensils, putting your fork/spoon down between bites and not watching TV/computer during meals!  Make a list of activities to prevent boredom, stress and emotional eating    Go for a walk or lift weights while watching your favorite TV show    Talk to a co-worker or email/call a friend     Keep your hands and mind busy with woodworking, puzzles, knitting or painting your nails    Practice deep breathing or meditation  Drink 64 ounces of 0-Calorie drinks between meals      Water    Zero calorie Propel , Vitamin Water Zero  or [employer] , Crystal Light , Sugar-Free Micah-Aid , and other sugar-free lemonade or flavored kimble    Decaffeinated, unsweetened coffee/ tea (1 cup or less per day)   Avoid Caffeine and Carbonation- NO STRAWS    Caffeine can irritate your new pouch, increase risk for ulcers, and can increase your appetite      Carbonation can lead to increased bloating/gas and discomfort   Work up to a total of 30-60 minutes of physical activity most days of the week    Helps with losing weight and preventing weight regain after surgery     Do a combination of cardio (walking/water exercises/biking/swimming) and strength training  Take daily vitamin/mineral supplements after surgery    Daily use of vitamins/minerals is necessary for the rest of your life      Psychological Evaluation for Bariatric Surgery      Why do I need a psychological evaluation before bariatric surgery?     The psychologist's main purpose is to provide the support and education to ensure you have the most successful outcome after surgery.   Preparing for bariatric surgery often involves changing behavior patterns. Working on these changes before surgery allows you to achieve the best results after surgery as some of these behaviors have been entrenched for many years. In addition, your relationship with food may need to change. Psychologists are experts in behavior change and are there to guide and support you as you make these important changes.   A significant life change, like losing a lot of weight, may affect many areas of your life--self-concept, physical activity and relationships with others. Your psychologist will help you prepare to adjust to these changes in a healthy way.   If you have mental health symptoms, relationship struggles, or other challenges, your psychologist will suggest how to best address these concerns so that they do not interfere with your progress toward a healthier  lifestyle.       Is the psychologist looking for reasons to say I can't have surgery?   The goal is to not find specific psychological problems. Instead, the psychologist will be working with your strengths to ensure you have the most optimal outcome after surgery.  There is no expectation that you will have everything figured out already or that you must have  perfect  behaviors before moving forward with surgery. Your psychologist is expecting that you will have some behavior changes that will need to occur concurrent with the surgery.   You can feel comfortable that the psychologist is not there to    you or your habits. The psychologist is there to provide support and encourage your progress.      What should I expect during the evaluation?   During the interview, which could take place over 2 or more sessions, the psychologist will ask about:   -weight history, current eating pattern and fluid intake, and previous attempts at weight loss   -activity level   -psychiatric history  -drug, alcohol and nicotine use   -social and developmental history  -support system   -current stressors and coping mechanisms   -understanding of the risks of surgery   -expectations for outcomes after surgery   You will complete various questionnaires that will focus on coping strategies, eating behaviors, quality of life and adverse childhood experiences in order to provide additional information to inform the assessment.   Your psychologist will likely give you some  homework assignments  to practice as you prepare for surgery. This will be individually tailored to your specific needs.   Your psychologist will talk with you about some of the typical challenges that patients might encounter after surgery and how to prepare for these.   A final report will be generated and any treatment recommendations will be discussed with you and the bariatric team to determine next steps.   If you would like, you may have any support people  (e.g., spouse) join a session of the evaluation to provide additional information.       Bariatric surgery offers a physical  tool  for weight management. Similarly, your work with the psychologist will provide you with some additional emotional and behavioral  tools  as you work toward your healthier lifestyle goals.      Support Group    Support and accountability is an important part of your weight loss journey and maintenance once you reach your health goals.  Because of this, we require that you attend at least one of our support groups before you meet with the surgeon so you get a feel for what they are like and hopefully establish a connection to others who are going through a similar process or have had surgery before so you can ask your questions.    We currently have two options for support group but they are in the virtual format only at this time.  Both groups are using Microsoft Teams for their platform and you can access it through the web or an steffen that can be downloaded to a smart phone if you have one.      The Pre- and Post-op Connections Group is on the third Tuesday of the month from 6:30-8pm and is hosted by Arun Cano, PhD.  If you are interested in this group, you will need to email him at [email or web address] each month and then he will in turn send you the invitation to join.      We also have a Post-op focused Connections Group the 4th Wednesday of the month from 11am-12pm that is mostly geared toward post-operative patients who are past three months post-op.  This group is hosted by ALEXANDER Pearce, CBN, CIC and you can email her to join the group at [email or web address] each month and she will send you an invite similar to Arun's group.  If you decide you would like to be a regular attender at this group, we can add you to an automatic invitation list of people.    You can see more information about available support groups that the [facility] offers to our patients  as well by following the link:    The following Support Group information can also be found on our website:  [email or web address]      Please let us know if you have any questions about all the information above and we will be talking more about this during future visits.     Thank you,   [facility] Bariatric Team       Bariatric Task List    Fax:  Please fax all paperwork to: [phone] -     Status:  Is patient a candidate for bariatric surgery?:  patient is a candidate for bariatric surgery -     Cleared to schedule surgeon consult?:  not cleared to schedule surgeon consult -     Status:  surgery evaluation in process -     Surgeon: Cecy Bernard -        Insurance: Insurance:  [facility] -      Contact insurance to discuss coverage: Needed -       Other:  Please contact Gloria Torres at [phone] to discuss insurance needs. -        Patient Info: Initial Weight:  294 lbs -     Date of Initial Weight/Height:  3/7/2024 -     Required Weight Loss:  no weight gain -     Surgery Type:  undecided -        Dietician Visits: Structured weight loss required by insurance?:  no structured weight loss required -     Clearance from dietician to see surgeon?:  Needed -     Dietician Notes:    -        Psychological Evaluation: Psych eval:  Needed -     Other:    -        Lab Work: Complete Blood Count:  Completed -     Comprehensive Metabolic Panel:  Completed -     Vitamin D:  Completed -     PTH:  Completed -     Hgb A1c:  Completed -      Lipids: Completed -      TSH ([facility], [facility], [place] [facility]): Completed -       Ferritin: Completed -       Folate: Completed -     Thiamine: Completed -     Vitamin A: Completed -     Vitamin B12: Completed -     Zinc: Completed -     Other:    -        Consults/ Clearance Sleep Medicine:  Needed - Please call the Sleep Clinic for a consult as soon as you are able at [phone]   Other:    -        PCP: Establish care with PCP:   "Completed -        Health Maintenance: Colonoscopy(> 50 yrs or family hx):  Completed - 10/18/2022   Mammogram (> 40 yrs or family hx):  Completed - 5/5/2023   Pap Smear (women): Needed - Due May 5/13/2024   Other:    -        Patient Education:  Information Session:  Completed -     Given \"Making your decision\" handout?:  Yes -     Given \"A Roadmap to you Weight Loss Surgery\" handout?: Yes -     Given \"Epic Care Companion\" information?: No -     Attended support group?:  Needed - must attend at least once prior to surgery      Additional Surgery Requirements: Review Coag plan:  Completed - standard   Gallstone prevention plan (Actigall for 6 months postop): Completed - ken hx   Other:   -        Final Tasks:  Before surgery online preop class:  Needed -     After surgery online class:  Needed -     History and Physical: Primary Care Provider -   Needed -     Final labs per clinic: Needed -     Chest xray per clinic: Needed -     Electrocardiogram (ECG) per clinic: Needed -     Schedule postop appointments: Needed -        Notes: Final tasks will be ordered and scheduled once surgery is scheduled   -            "

## 2024-03-07 NOTE — PROGRESS NOTES
"New Bariatric Surgery Consultation Note    2024    RE: Brittney Moon  MR#: [id number]  : 1975      Referring provider:       3/7/2024     2:53 PM   --   Who referred you obinna ric       Chief Complaint/Reason for visit: evaluation for possible weight loss surgery    Dear Obinna Cotter PA-C (General),    I had the pleasure of seeing your patient, Brittney Moon, to evaluate her obesity and consider her for possible weight loss surgery. As you know, Brittney Moon is 48 year old.  She has a height of 4' 11.5\", a weight of 294 lbs 3.2 oz, and calculated Body mass index is 58.43 kg/m .        HISTORY OF PRESENT ILLNESS:      3/7/2024     2:53 PM   Weight Loss History Reviewed with Patient   How long have you been overweight? Since late teens through early 20's   What is the most that you have ever weighed 406   What is the most weight you have lost? 150   I have tried the following methods to lose weight Watching portions or calories    Exercise    Weight Watchers    Kacey Marquez    Pre packaged meals ex: Nutrisystem   I have tried the following weight loss medications? (Check all that apply) Topamax/Topiramate    Metformin       CO-MORBIDITIES OF OBESITY INCLUDE:      3/7/2024     2:53 PM   --   I have the following health issues associated with obesity Type II Diabetes    Osteoarthritis (joint disease)       PAST MEDICAL HISTORY:  Past Medical History:   Diagnosis Date    Other convulsions     seizure disorder    Unspecified osteomyelitis, lower leg        PAST SURGICAL HISTORY:  Past Surgical History:   Procedure Laterality Date    COLONOSCOPY N/A 10/18/2022    Procedure: COLONOSCOPY, WITH POLYPECTOMY;  Surgeon: Micah Reno MD;  [facility]: [facility]    EXCISE GANGLION WRIST Left 2018    Procedure: EXCISION LEFT WRIST GANGLION CYST;  Surgeon: Kehinde Pino DO;  [facility]    INJECT FACET JOINT Bilateral 2019    Procedure: lumbar sacral facet injection bilateral " 4-5 sacral 1;  Surgeon: Canelo Zamora MD;  Location: [facility]    ZZC AMPUTATION LOW LEG THRU TIB/FIB      Below knee amputation secondary to osteomyelitis       FAMILY HISTORY:   No family history on file.    SOCIAL HISTORY:       3/7/2024     2:53 PM   Social History Questions Reviewed With Patient   Which best describes your employment status (select all that apply) I work nights   If you work, what is your occupation? [employer]   Which best describes your marital status single   Who do you have in your support network that can be available to help you for the first 2 weeks after surgery? immediate family   Who can you count on for support throughout your weight loss surgery journey? immediate family, close friends       HABITS:      3/7/2024     2:53 PM   --   How often do you drink alcohol? Never   Do you currently use any of the following Nicotine products? No   Have you ever used any of the following nicotine products? No   Have you or are you currently using street drugs or prescription strength medication for which you do not have a prescription for? No   Do you have a history of chemical dependency (alcohol or drug abuse)? No       PSYCHOLOGICAL HISTORY:       3/7/2024     2:53 PM   Psychological History Reviewed With Patient   Have you ever attempted suicide? More than 10 years ago.   Have you had thoughts of suicide in the past year? No   Have you ever been hospitalized for mental illness or a suicide attempt? More than 10 years ago.   Do you have a history of chronic pain? Yes   Have you ever been diagnosed with fibromyalgia? No   Are you currently being treated for any of the following? (select all that apply) Depression    Anxiety   Are you currently seeing a therapist or counselor? Yes   Are you currently seeing a psychiatrist? Yes       ROS:      3/7/2024     2:53 PM   --   Skin None of the above   HEENT Headaches    Missing teeth   If you answered yes to missing teeth,  please indicate how many 1   Musculoskeletal Back pain    Limited mobility    Arthritis   Cardiovascular None of the above   Pulmonary Snoring    People have told me I stop breathing while asleep   Gastrointestinal None of the above   Genitourinary Kidney stones   Hematological None of the above   Neurological Migraine headaches   Female only Post-menopausal       EATING BEHAVIORS:      3/7/2024     2:53 PM   --   Have you or anyone else thought that you had an eating disorder? No   Do you currently binge eat (eat a large amount of food in a short time)? No   Are you an emotional eater? Yes   Do you get up to eat after falling asleep? No   Can you afford 3 meals a day? Yes   Can you afford 50-60 dollars a month for vitamins? Yes       EXERCISE:      3/7/2024     2:53 PM   --   How often do you exercise? Less than 1 time per week   What is the duration of your exercise (in minutes)? 30 Minutes   What types of exercise do you do? other   What keeps you from being more active? I am as active as I can possbily be    Pain    My ability to walk or move around is limited   Amputation of leg as child due to osteomyelitis with revision to AKA years later which made her unable to use a prosthetic making her wheelchair bound. She was able to use crutches prior to the weight gain and is able to perform ADLs and transfers independently, but this is getting more difficult with her current weight. She is seeking to maintain as much mobility as possible as long as possible and believes weight loss with aid her in this goal.    MEDICATIONS:  Current Outpatient Medications   Medication Sig Dispense Refill    acetaminophen (TYLENOL) 500 MG tablet 1 tablet as needed Orally every 6 hrs      cyanocobalamin (VITAMIN B-12) 1000 MCG tablet Take 1,000 mcg by mouth daily      FLUoxetine (PROZAC) 40 MG capsule Take 80 mg by mouth every morning      gabapentin (NEURONTIN) 300 MG capsule 1 cap (300mg) in the AM and 2 caps (600mg) at bed 90  "capsule 3    HYDROcodone-acetaminophen (NORCO) 5-325 MG tablet 0.5 to 1 tablet as needed Orally (ok to fill 05/25/2023) every 8-12 hrs (max 2 tabs/day) for 30 days      metFORMIN (GLUCOPHAGE XR) 500 MG 24 hr tablet TAKE ONE TABLET BY MOUTH TWICE A DAY WITH MEALS 180 tablet 1    methocarbamol (ROBAXIN) 500 MG tablet TAKE ONE TABLET BY MOUTH FOUR TIMES A DAY AS NEEDED FOR MUSCLE SPASMS 120 tablet 3    simvastatin (ZOCOR) 5 MG tablet TAKE ONE TABLET (5MG) BY MOUTH AT BEDTIME 90 tablet 3    SUMAtriptan (IMITREX) 25 MG tablet TAKE ONE TABLET BY MOUTH AT ONSET OF HEADACHE FOR MIGRAINE, MAY REPEAT DOSE AFTER 2 HOURS IF NEEDED. DO NOT TAKE MORE THAN 200MG IN 24 HOURS. 18 tablet 1    topiramate (TOPAMAX) 100 MG tablet Take 100 mg by mouth 2 times daily Take with 50 mg for totally dose of 150 mg twice daily      topiramate (TOPAMAX) 50 MG tablet Take 50 mg by mouth 2 times daily Take with 100 mg tablet for total dose of 150 mg twice daily      traZODone (DESYREL) 100 MG tablet Take 100 mg by mouth At Bedtime      Vitamin D3 (CHOLECALCIFEROL) 25 mcg (1000 units) tablet Take 25 mcg by mouth daily      diazepam (VALIUM) 5 MG tablet Take 1 tablet (5 mg) by mouth See Admin Instructions Take 5mg 30 minutes prior to MRI, then may take an additional 5mg at time of exam if needed. MUST have a . 2 tablet 0    ketorolac (TORADOL) 10 MG tablet Take 10 mg by mouth every 6 hours as needed for moderate pain (4-6) (Patient not taking: Reported on 3/7/2024)         ALLERGIES:  Allergies   Allergen Reactions    Food Anaphylaxis     cilantro    Codeine Other (See Comments)    Hydromorphone Nausea and Vomiting    Coriandrum Sativum Rash       LABS/IMAGING/MEDICAL RECORDS REVIEW: reviewed and ordered  Mammogram in 2023  Colonoscopy in 2023  Pap Smear in 2022    PHYSICAL EXAM:  BP (!) 128/24   Ht 1.511 m (4' 11.5\")   Wt 133.4 kg (294 lb 3.2 oz)   LMP  (LMP Unknown)   BMI 58.43 kg/m    General Appearance  No acute distress. Obesity "   Alert and Oriented   Ambulatory without a device  HEENT  PERRLA, EOMI; Melampati Score III  Neck  Stout; No carotid bruits or JVD  Cardiovascular  Regular rate and rhythm  No murmur or gallop  Pulmonary  LS CTA bilaterally  Abdomen  Soft, NT/ND, No rebound, no guarding  No rashes  Extremities:  Pitting edema: +4  Distal pulses: palpable on present extremities; did not palpate groin pulse on left leg with AKA  Neurologic  Cranial nerves grossly intact; no tremors present  Psychiatric  Thought content organized  Mood appears stable  Endocrine  Garces Facies not present  Dorsal Thoracic Prominence not present  Skin tags not present  Acanthosis nigricans not present  Purple Striae not present  Dermatologic  Intertrigo not present  Hirsutism present      In summary, Brittney Moon has type 2 DM and a history of osteomyelitis s/p left AKA in the setting of morbid obesity. She is seeking to increase her activity and health with weight loss. She is wheelchair bound due to left AKA secondary to osteomyelitis as a child and is unable to use crutches given her current weight. She is able to perform all ADL's and transfers from her chair independently and is afraid that she will not maintain this independence much longer at her current weight. Her goal is to increase her mobility with weight loss and maybe even regain the ability to use crutches to decrease her total dependence on her wheelchair.  Body mass index is 58.43 kg/m .  This satisfies NIH criteria for bariatric surgery. Once Brittney Moon has been cleared by our bariatric psychologist and our bariatric dietitian, completed initial lab work, attended one support group, and satisfied insurance mandated visits if applicable, she  will be scheduled with  Dr. Sam for Bariatric Surgery Consultation.  Brittney Moon understands that routine health care maintenance will need to be up to date prior to surgery. she verbalizes understanding of the process to surgery and  expectations for the postoperative period including the need for lifelong lifestyle changes, vitamin supplementation, and laboratory monitoring.       Weight will need to be 294 prior to submitting for insurance approval.  Standard DVT protocol  Sleep Study will be needed  A1c will need to be < 8 at the time of surgery        MARIA ANTONIA Olguin [facility]  [phone]  [facility] Bariatric Surgery [facility]     Total time spent on the date of this encounter doing: chart review, review of test results, patient visit, physical exam, education, counseling, developing plan of care, and documenting = 70 minutes.

## 2024-03-08 LAB
ALBUMIN SERPL BCG-MCNC: 4 G/DL (ref 3.5–5.2)
ALP SERPL-CCNC: 99 U/L (ref 40–150)
ALT SERPL W P-5'-P-CCNC: 16 U/L (ref 0–50)
ANION GAP SERPL CALCULATED.3IONS-SCNC: 8 MMOL/L (ref 7–15)
AST SERPL W P-5'-P-CCNC: 16 U/L (ref 0–45)
BILIRUB SERPL-MCNC: 0.2 MG/DL
BUN SERPL-MCNC: 20.2 MG/DL (ref 6–20)
CALCIUM SERPL-MCNC: 9.1 MG/DL (ref 8.6–10)
CHLORIDE SERPL-SCNC: 107 MMOL/L (ref 98–107)
CHOLEST SERPL-MCNC: 155 MG/DL
CREAT SERPL-MCNC: 0.95 MG/DL (ref 0.51–0.95)
DEPRECATED HCO3 PLAS-SCNC: 23 MMOL/L (ref 22–29)
EGFRCR SERPLBLD CKD-EPI 2021: 73 ML/MIN/1.73M2
FASTING STATUS PATIENT QL REPORTED: NORMAL
FERRITIN SERPL-MCNC: 36 NG/ML (ref 6–175)
FOLATE SERPL-MCNC: 10.5 NG/ML (ref 4.6–34.8)
GLUCOSE SERPL-MCNC: 86 MG/DL (ref 70–99)
HDLC SERPL-MCNC: 50 MG/DL
LDLC SERPL CALC-MCNC: 84 MG/DL
NONHDLC SERPL-MCNC: 105 MG/DL
POTASSIUM SERPL-SCNC: 4 MMOL/L (ref 3.4–5.3)
PROT SERPL-MCNC: 7 G/DL (ref 6.4–8.3)
PTH-INTACT SERPL-MCNC: 47 PG/ML (ref 15–65)
SODIUM SERPL-SCNC: 138 MMOL/L (ref 135–145)
TRIGL SERPL-MCNC: 104 MG/DL
TSH SERPL DL<=0.005 MIU/L-ACNC: 3.38 UIU/ML (ref 0.3–4.2)
VIT B12 SERPL-MCNC: 287 PG/ML (ref 232–1245)

## 2024-03-09 LAB
DEPRECATED CALCIDIOL+CALCIFEROL SERPL-MC: <23 UG/L (ref 20–75)
VITAMIN D2 SERPL-MCNC: <5 UG/L
VITAMIN D3 SERPL-MCNC: 18 UG/L

## 2024-03-11 LAB
ANNOTATION COMMENT IMP: ABNORMAL
RETINYL PALMITATE SERPL-MCNC: <0.02 MG/L
VIT A SERPL-MCNC: 0.27 MG/L
ZINC SERPL-MCNC: 77.1 UG/DL

## 2024-03-12 LAB — VIT B1 PYROPHOSHATE BLD-SCNC: 189 NMOL/L

## 2024-03-20 ENCOUNTER — HOSPITAL ENCOUNTER (EMERGENCY)
Facility: CLINIC | Age: 49
Discharge: HOME | End: 2024-03-20
Attending: FAMILY MEDICINE | Admitting: FAMILY MEDICINE
Payer: COMMERCIAL

## 2024-03-20 ENCOUNTER — APPOINTMENT (OUTPATIENT)
Dept: CT IMAGING | Facility: CLINIC | Age: 49
End: 2024-03-20
Attending: FAMILY MEDICINE
Payer: COMMERCIAL

## 2024-03-20 VITALS
SYSTOLIC BLOOD PRESSURE: 124 MMHG | HEART RATE: 78 BPM | OXYGEN SATURATION: 97 % | TEMPERATURE: 98 F | RESPIRATION RATE: 18 BRPM | DIASTOLIC BLOOD PRESSURE: 82 MMHG

## 2024-03-20 DIAGNOSIS — M54.50 ACUTE EXACERBATION OF CHRONIC LOW BACK PAIN: ICD-10-CM

## 2024-03-20 DIAGNOSIS — I87.2 VENOUS STASIS DERMATITIS OF RIGHT LOWER EXTREMITY: ICD-10-CM

## 2024-03-20 DIAGNOSIS — G89.29 ACUTE EXACERBATION OF CHRONIC LOW BACK PAIN: ICD-10-CM

## 2024-03-20 LAB
ALBUMIN UR-MCNC: NEGATIVE MG/DL
ANION GAP SERPL CALCULATED.3IONS-SCNC: 8 MMOL/L (ref 7–15)
APPEARANCE UR: CLEAR
BACTERIA #/AREA URNS HPF: ABNORMAL /HPF
BASOPHILS # BLD AUTO: 0.1 10E3/UL (ref 0–0.2)
BASOPHILS NFR BLD AUTO: 1 %
BILIRUB UR QL STRIP: NEGATIVE
BUN SERPL-MCNC: 22.7 MG/DL (ref 6–20)
CALCIUM SERPL-MCNC: 8.7 MG/DL (ref 8.6–10)
CHLORIDE SERPL-SCNC: 106 MMOL/L (ref 98–107)
COLOR UR AUTO: YELLOW
CREAT SERPL-MCNC: 0.95 MG/DL (ref 0.51–0.95)
DEPRECATED HCO3 PLAS-SCNC: 22 MMOL/L (ref 22–29)
EGFRCR SERPLBLD CKD-EPI 2021: 73 ML/MIN/1.73M2
EOSINOPHIL # BLD AUTO: 0.4 10E3/UL (ref 0–0.7)
EOSINOPHIL NFR BLD AUTO: 5 %
ERYTHROCYTE [DISTWIDTH] IN BLOOD BY AUTOMATED COUNT: 13.2 % (ref 10–15)
GLUCOSE SERPL-MCNC: 108 MG/DL (ref 70–99)
GLUCOSE UR STRIP-MCNC: NEGATIVE MG/DL
HCT VFR BLD AUTO: 40.2 % (ref 35–47)
HGB BLD-MCNC: 12.7 G/DL (ref 11.7–15.7)
HGB UR QL STRIP: NEGATIVE
IMM GRANULOCYTES # BLD: 0 10E3/UL
IMM GRANULOCYTES NFR BLD: 0 %
KETONES UR STRIP-MCNC: NEGATIVE MG/DL
LEUKOCYTE ESTERASE UR QL STRIP: NEGATIVE
LYMPHOCYTES # BLD AUTO: 2.6 10E3/UL (ref 0.8–5.3)
LYMPHOCYTES NFR BLD AUTO: 30 %
MCH RBC QN AUTO: 28.9 PG (ref 26.5–33)
MCHC RBC AUTO-ENTMCNC: 31.6 G/DL (ref 31.5–36.5)
MCV RBC AUTO: 92 FL (ref 78–100)
MONOCYTES # BLD AUTO: 0.5 10E3/UL (ref 0–1.3)
MONOCYTES NFR BLD AUTO: 6 %
NEUTROPHILS # BLD AUTO: 4.9 10E3/UL (ref 1.6–8.3)
NEUTROPHILS NFR BLD AUTO: 58 %
NITRATE UR QL: NEGATIVE
NRBC # BLD AUTO: 0 10E3/UL
NRBC BLD AUTO-RTO: 0 /100
PH UR STRIP: 6 [PH] (ref 5–7)
PLATELET # BLD AUTO: 276 10E3/UL (ref 150–450)
POTASSIUM SERPL-SCNC: 3.6 MMOL/L (ref 3.4–5.3)
RBC # BLD AUTO: 4.39 10E6/UL (ref 3.8–5.2)
RBC URINE: <1 /HPF
SODIUM SERPL-SCNC: 136 MMOL/L (ref 135–145)
SP GR UR STRIP: 1.02 (ref 1–1.03)
SQUAMOUS EPITHELIAL: 3 /HPF
UROBILINOGEN UR STRIP-MCNC: 2 MG/DL
WBC # BLD AUTO: 8.5 10E3/UL (ref 4–11)
WBC URINE: 1 /HPF

## 2024-03-20 PROCEDURE — 81001 URINALYSIS AUTO W/SCOPE: CPT | Performed by: FAMILY MEDICINE

## 2024-03-20 PROCEDURE — 74176 CT ABD & PELVIS W/O CONTRAST: CPT

## 2024-03-20 PROCEDURE — 250N000011 HC RX IP 250 OP 636: Performed by: FAMILY MEDICINE

## 2024-03-20 PROCEDURE — 80048 BASIC METABOLIC PNL TOTAL CA: CPT | Performed by: FAMILY MEDICINE

## 2024-03-20 PROCEDURE — 99285 EMERGENCY DEPT VISIT HI MDM: CPT | Mod: 25 | Performed by: FAMILY MEDICINE

## 2024-03-20 PROCEDURE — 99284 EMERGENCY DEPT VISIT MOD MDM: CPT | Performed by: FAMILY MEDICINE

## 2024-03-20 PROCEDURE — 96375 TX/PRO/DX INJ NEW DRUG ADDON: CPT | Performed by: FAMILY MEDICINE

## 2024-03-20 PROCEDURE — 85025 COMPLETE CBC W/AUTO DIFF WBC: CPT | Performed by: FAMILY MEDICINE

## 2024-03-20 PROCEDURE — 96374 THER/PROPH/DIAG INJ IV PUSH: CPT | Performed by: FAMILY MEDICINE

## 2024-03-20 PROCEDURE — 36415 COLL VENOUS BLD VENIPUNCTURE: CPT | Performed by: FAMILY MEDICINE

## 2024-03-20 RX ORDER — KETOROLAC TROMETHAMINE 30 MG/ML
30 INJECTION, SOLUTION INTRAMUSCULAR; INTRAVENOUS ONCE
Status: COMPLETED | OUTPATIENT
Start: 2024-03-20 | End: 2024-03-20

## 2024-03-20 RX ORDER — HYDROMORPHONE HYDROCHLORIDE 1 MG/ML
0.5 INJECTION, SOLUTION INTRAMUSCULAR; INTRAVENOUS; SUBCUTANEOUS EVERY 30 MIN PRN
Status: DISCONTINUED | OUTPATIENT
Start: 2024-03-20 | End: 2024-03-20 | Stop reason: HOSPADM

## 2024-03-20 RX ORDER — ONDANSETRON 2 MG/ML
4 INJECTION INTRAMUSCULAR; INTRAVENOUS
Status: COMPLETED | OUTPATIENT
Start: 2024-03-20 | End: 2024-03-20

## 2024-03-20 RX ADMIN — KETOROLAC TROMETHAMINE 30 MG: 30 INJECTION, SOLUTION INTRAMUSCULAR at 07:40

## 2024-03-20 RX ADMIN — ONDANSETRON 4 MG: 2 INJECTION INTRAMUSCULAR; INTRAVENOUS at 08:19

## 2024-03-20 RX ADMIN — HYDROMORPHONE HYDROCHLORIDE 0.5 MG: 1 INJECTION, SOLUTION INTRAMUSCULAR; INTRAVENOUS; SUBCUTANEOUS at 08:21

## 2024-03-20 ASSESSMENT — COLUMBIA-SUICIDE SEVERITY RATING SCALE - C-SSRS
2. HAVE YOU ACTUALLY HAD ANY THOUGHTS OF KILLING YOURSELF IN THE PAST MONTH?: NO
6. HAVE YOU EVER DONE ANYTHING, STARTED TO DO ANYTHING, OR PREPARED TO DO ANYTHING TO END YOUR LIFE?: NO
1. IN THE PAST MONTH, HAVE YOU WISHED YOU WERE DEAD OR WISHED YOU COULD GO TO SLEEP AND NOT WAKE UP?: NO

## 2024-03-20 ASSESSMENT — ACTIVITIES OF DAILY LIVING (ADL)
ADLS_ACUITY_SCORE: 40
ADLS_ACUITY_SCORE: 40

## 2024-03-20 NOTE — DISCHARGE INSTRUCTIONS
1.  I would wear some support hose for your legs especially when you are working to help keep your swelling down.  I expect that your swelling improves, the redness in your leg should improve.

## 2024-03-20 NOTE — ED PROVIDER NOTES
History     Chief Complaint   Patient presents with    Leg Swelling    Flank Pain     HPI  Brittney Moon is a 48 year old female who presents with concerns of right flank pain and right-sided abdominal pain has been going on for a little bit more than 24 hours.  Patient states that the pain comes and goes.  Patient has a history of kidney stones and feels similar.  Denies any recent hematuria.  Denies any recent fevers or chills.  Patient's last kidney stone was a year and a half ago.  Patient has had to have procedures done in the past.  Patient is also concerned about some increasing swelling in her right leg.  Patient has amputation of her left leg.  She is wondering if the swelling could be because she was working all weekend and her leg was down.  She got concerned because she did notice a little bit of redness down there.  She has not had any fevers.  It is not significantly painful.    Allergies:  Allergies   Allergen Reactions    Food Anaphylaxis     cilantro    Codeine Other (See Comments)    Hydromorphone Nausea and Vomiting    Coriandrum Sativum Rash       Problem List:    Patient Active Problem List    Diagnosis Date Noted    Complex regional pain syndrome i of left lower limb 07/29/2022     Priority: Medium    Opioid dependence (H) 07/29/2022     Priority: Medium    Chronic low back pain 08/23/2021     Priority: Medium    Inability to bear weight 02/11/2021     Priority: Medium    Fall at home, initial encounter 01/06/2020     Priority: Medium    Left hip pain 01/06/2020     Priority: Medium    Ganglion cyst 11/19/2018     Priority: Medium    Type 2 diabetes mellitus without complication, without long-term current use of insulin (H) 10/21/2018     Priority: Medium    Morbid obesity (H) 10/21/2018     Priority: Medium    Amputation of leg (H) 11/17/2017     Priority: Medium     Formatting of this note might be different from the original.  Overview:   15 surgeries; following club foot repair and  osteomyelitis as childe      History of right knee joint replacement 11/17/2017     Priority: Medium    Anxiety disorder due to medical condition 08/03/2010     Priority: Medium        Past Medical History:    Past Medical History:   Diagnosis Date    Back pain     Migraine     Nephrolithiasis     Osteoarthritis     Other convulsions     Unspecified osteomyelitis, lower leg        Past Surgical History:    Past Surgical History:   Procedure Laterality Date    CHOLECYSTECTOMY, LAPOROSCOPIC  1995    COLONOSCOPY N/A 10/18/2022    Procedure: COLONOSCOPY, WITH POLYPECTOMY;  Surgeon: Micah Reno MD;  Location: [facility]    COMBINED CYSTOSCOPY, LASER HOLMIUM LITHOTRIPSY URETER(S)      EXCISE GANGLION WRIST Left 12/24/2018    Procedure: EXCISION LEFT WRIST GANGLION CYST;  Surgeon: Kehinde Pino DO;  Location: [facility]    INGUINAL HERNIA REPAIR Right     INJECT FACET JOINT Bilateral 11/29/2019    Procedure: lumbar sacral facet injection bilateral 4-5 sacral 1;  Surgeon: Canelo Zamora MD;  Location: [facility]    TOTAL KNEE ARTHROPLASTY Right 2012    WISDOM TOOTH EXTRACTION      ZZC AMPUTATION LOW LEG THRU TIB/FIB      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    Social History:  Marital Status:  Single [1]  Social History     Tobacco Use    Smoking status: Never    Smokeless tobacco: Never   Vaping Use    Vaping Use: Never used   Substance Use Topics    Alcohol use: Yes     Comment: rare    Drug use: No        Medications:    acetaminophen (TYLENOL) 500 MG tablet  cyanocobalamin (VITAMIN B-12) 1000 MCG tablet  FLUoxetine (PROZAC) 40 MG capsule  HYDROcodone-acetaminophen (NORCO) 5-325 MG tablet  metFORMIN (GLUCOPHAGE XR) 500 MG 24 hr tablet  methocarbamol (ROBAXIN) 500 MG tablet  simvastatin (ZOCOR) 5 MG tablet  SUMAtriptan (IMITREX) 25 MG tablet  topiramate (TOPAMAX) 100 MG tablet  topiramate (TOPAMAX) 50 MG tablet  traZODone (DESYREL) 100 MG tablet  Vitamin  D3 (CHOLECALCIFEROL) 25 mcg (1000 units) tablet          Review of Systems   All other systems reviewed and are negative.      Physical Exam   BP: 119/73  Pulse: 80  Temp: 98  F (36.7  C)  Resp: 18  SpO2: 97 %      Physical Exam  Vitals and nursing note reviewed.   Constitutional:       General: She is not in acute distress.     Appearance: Normal appearance. She is not ill-appearing.   Cardiovascular:      Rate and Rhythm: Normal rate.      Pulses: Normal pulses.   Pulmonary:      Effort: Pulmonary effort is normal. No respiratory distress.   Abdominal:      General: Abdomen is flat. There is no distension.      Tenderness: There is abdominal tenderness (ruq/rlq).   Musculoskeletal:         General: Tenderness present.      Comments: Tender over the right flank and right lower back area   Neurological:      Mental Status: She is alert.         ED Course        Procedures           Results for orders placed or performed during the hospital encounter of 03/20/24 (from the past 24 hour(s))   CBC with platelets differential    Narrative    The following orders were created for panel order CBC with platelets differential.  Procedure                               Abnormality         Status                     ---------                               -----------         ------                     CBC with platelets and d...[[id number]]                      Final result                 Please view results for these tests on the individual orders.   Basic metabolic panel   Result Value Ref Range    Sodium 136 135 - 145 mmol/L    Potassium 3.6 3.4 - 5.3 mmol/L    Chloride 106 98 - 107 mmol/L    Carbon Dioxide (CO2) 22 22 - 29 mmol/L    Anion Gap 8 7 - 15 mmol/L    Urea Nitrogen 22.7 (H) 6.0 - 20.0 mg/dL    Creatinine 0.95 0.51 - 0.95 mg/dL    GFR Estimate 73 >60 mL/min/1.73m2    Calcium 8.7 8.6 - 10.0 mg/dL    Glucose 108 (H) 70 - 99 mg/dL   CBC with platelets and differential   Result Value Ref Range    WBC Count 8.5 4.0 -  11.0 10e3/uL    RBC Count 4.39 3.80 - 5.20 10e6/uL    Hemoglobin 12.7 11.7 - 15.7 g/dL    Hematocrit 40.2 35.0 - 47.0 %    MCV 92 78 - 100 fL    MCH 28.9 26.5 - 33.0 pg    MCHC 31.6 31.5 - 36.5 g/dL    RDW 13.2 10.0 - 15.0 %    Platelet Count 276 150 - 450 10e3/uL    % Neutrophils 58 %    % Lymphocytes 30 %    % Monocytes 6 %    % Eosinophils 5 %    % Basophils 1 %    % Immature Granulocytes 0 %    NRBCs per 100 WBC 0 <1 /100    Absolute Neutrophils 4.9 1.6 - 8.3 10e3/uL    Absolute Lymphocytes 2.6 0.8 - 5.3 10e3/uL    Absolute Monocytes 0.5 0.0 - 1.3 10e3/uL    Absolute Eosinophils 0.4 0.0 - 0.7 10e3/uL    Absolute Basophils 0.1 0.0 - 0.2 10e3/uL    Absolute Immature Granulocytes 0.0 <=0.4 10e3/uL    Absolute NRBCs 0.0 10e3/uL   CT Abdomen Pelvis w/o Contrast    Narrative    CT ABDOMEN AND PELVIS WITHOUT CONTRAST 3/20/2024 8:06 AM    CLINICAL HISTORY: Right flank pain.    TECHNIQUE: CT scan of the abdomen and pelvis was performed without IV  contrast. Multiplanar reformats were obtained. Dose reduction  techniques were used.  CONTRAST: None.    COMPARISON: CT of the abdomen and pelvis 7/12/2008.    FINDINGS:   LOWER CHEST: The visualized lung bases are clear.    HEPATOBILIARY: No hepatic masses. Cholecystectomy.    PANCREAS: Normal.    SPLEEN: Normal.    ADRENAL GLANDS: Normal.    KIDNEYS/BLADDER: Unremarkable. No hydronephrosis.    BOWEL: No bowel obstruction. No convincing evidence for colitis or  diverticulitis. Unremarkable appendix.    LYMPH NODES: No enlarged lymph nodes are identified in the abdomen or  pelvis.    VASCULATURE: Unremarkable.    PELVIC ORGANS: Unremarkable.    ADDITIONAL FINDINGS: None.    MUSCULOSKELETAL: Spinal stimulator device in the subcutaneous fat of  the right flank region, with two leads extending into the spinal  canal. No associated fluid collection. No destructive bony lesions.      Impression    IMPRESSION: No acute abnormality in the abdomen or pelvis. No  definitive cause for  right flank pain is identified.   UA with Microscopic reflex to Culture    Specimen: Urine, Midstream   Result Value Ref Range    Color Urine Yellow Colorless, Straw, Light Yellow, Yellow    Appearance Urine Clear Clear    Glucose Urine Negative Negative mg/dL    Bilirubin Urine Negative Negative    Ketones Urine Negative Negative mg/dL    Specific Gravity Urine 1.021 1.003 - 1.035    Blood Urine Negative Negative    pH Urine 6.0 5.0 - 7.0    Protein Albumin Urine Negative Negative mg/dL    Urobilinogen Urine 2.0 Normal, 2.0 mg/dL    Nitrite Urine Negative Negative    Leukocyte Esterase Urine Negative Negative    Bacteria Urine Few (A) None Seen /HPF    RBC Urine <1 <=2 /HPF    WBC Urine 1 <=5 /HPF    Squamous Epithelials Urine 3 (H) <=1 /HPF    Narrative    Urine Culture not indicated       Medications   HYDROmorphone (PF) (DILAUDID) injection 0.5 mg (0.5 mg Intravenous $Given 3/20/24 0821)   ketorolac (TORADOL) injection 30 mg (30 mg Intravenous $Given 3/20/24 0740)   ondansetron (ZOFRAN) injection 4 mg (4 mg Intravenous $Given 3/20/24 0819)     Labs of come back and are completely unremarkable including a normal white count and urine was normal.  CT scan did not show any kidney stones, no acute abnormalities were noted.  At this point I think the patient's back pain is likely related to just her chronic back pain.  She is on chronic pain medications for this and recommend that she continue to take her meds as prescribed.  Certainly working the last few days could have flared this up.  Her leg redness appears to be more stasis dermatitis.  It does not appear to be infected, there is no significant warmth and just a very lacy reticular red rash noted down there.  Recommend wearing some support hose to keep the swelling down.  Patient will follow-up as needed.    Assessments & Plan (with Medical Decision Making)  Chronic back pain exacerbation, stasis dermatitis     I have reviewed the nursing notes.    I have  reviewed the findings, diagnosis, plan and need for follow up with the patient.      New Prescriptions    No medications on file       Final diagnoses:   Acute exacerbation of chronic low back pain   Venous stasis dermatitis of right lower extremity       3/20/2024   [facility] EMERGENCY DEPT       Betito Cummings MD  03/20/24 0885

## 2024-03-20 NOTE — ED TRIAGE NOTES
"Pt states she has worked a \"[employer] hours this weekend and my right leg is swollen, I am prone to cellulitis. I also think I have a kidney stone.\" Right flank pain 8/10.  Alert and oriented.     Triage Assessment (Adult)       Row Name 03/20/24 0714          Triage Assessment    Airway WDL WDL        Respiratory WDL    Respiratory WDL WDL        Cognitive/Neuro/Behavioral WDL    Cognitive/Neuro/Behavioral WDL WDL                     "

## 2024-03-27 DIAGNOSIS — E11.9 TYPE 2 DIABETES MELLITUS WITHOUT COMPLICATION, WITHOUT LONG-TERM CURRENT USE OF INSULIN (H): ICD-10-CM

## 2024-03-27 RX ORDER — METFORMIN HCL 500 MG
500 TABLET, EXTENDED RELEASE 24 HR ORAL 2 TIMES DAILY WITH MEALS
Qty: 180 TABLET | Refills: 0 | Status: SHIPPED | OUTPATIENT
Start: 2024-03-27 | End: 2024-06-24

## 2024-04-08 ENCOUNTER — VIRTUAL VISIT (OUTPATIENT)
Dept: SURGERY | Facility: CLINIC | Age: 49
End: 2024-04-08
Payer: COMMERCIAL

## 2024-04-08 DIAGNOSIS — E66.01 MORBID OBESITY WITH BMI OF 50.0-59.9, ADULT (H): Primary | ICD-10-CM

## 2024-04-08 PROCEDURE — 97802 MEDICAL NUTRITION INDIV IN: CPT | Mod: 95 | Performed by: DIETITIAN, REGISTERED

## 2024-04-08 NOTE — PROGRESS NOTES
"Brittney Moon is a 48 year old who is being evaluated via a billable video visit.       Initial Structured Weight Loss Supervised Diet Evaluation     Assessment:  Brittney is being seen today for initial RD nutritional evaluation. Patient has been unsuccessful with non-surgical weight loss methods and is interested in bariatric surgery. Today we reviewed current eating habits and level of physical activity, and instructed on the changes that are required for successful bariatric outcomes.    Surgery of interest per pt: DS.    Workflow review:  Support Group: Not completed.  Psychology:In progress.  Lab work:Completed.  SWL:No    Weight goal: At or below initial.    Anthropometrics:  Pt's weight is 294 lb  Initial weight: 294 lb  Weight change: -  BMI: There is no height or weight on file to calculate BMI.   Patient must be at least 60 in tall to calculate ideal body weight    Medical History:  Patient Active Problem List   Diagnosis    Type 2 diabetes mellitus without complication, without long-term current use of insulin (H)    Morbid obesity (H)    Ganglion cyst    Amputation of leg (H)    Anxiety disorder due to medical condition    Chronic low back pain    Fall at home, initial encounter    History of right knee joint replacement    Inability to bear weight    Left hip pain    Complex regional pain syndrome i of left lower limb    Opioid dependence (H)      Diabetes: yes, type 2  HbA1c:  No results found for: \"HGBA1C\"    Nutrition History  Food allergies/intolerances/cultural or religous food customs: Yes allergic to cilantro, does not eat a lot of red meat, milk doesn't sit well   Diet history: Watching portions or calories, Exercise, Weight Watchers, Kacey Marquez, Pre packaged meals ex: Nutrisystem    Socioeconomic Status  Who does the grocery shopping for your household? self  Who prepares your meals at home? self    Diet Recall/Time  Breakfast: small bowl of oatmeal after getting home from work  Goes to " sleep  Lunch: 5 pm chicken, rice  2-3 am chicken, rice    Eating out: used to eat fast food more often, 2x per month    Beverages  Dr. Pepper: working on cutting down  Water: adding water flavoring to help her cut back on soda    Exercise  Restricted to a wheel chair, she has a few wheel chair workout videos that she can do    Nutrition Diagnosis (PES statement)    Morbid obesity related to excessive calorie intake as evidenced by Intake of high caloric density foods/beverages (juice, soda, alcohol) at meals and/or snacks; large portions; Estimated intake that exceeds estimated daily energy intake; and BMI 58.43     Intervention    Nutrition Education:   Provided general overview of diet and lifestyle modifications needed to be a deemed a safe candidate for bariatric surgery.   Educated patient on how to read a food label: choosing foods with than 10 grams fat and 10 grams sugar per serving to avoid dumping syndrome.  Dumping Syndrome: Described the mechanisms of syndrome, symptoms, and prevention tools from a dietary perspective.   Vitamins: Educated on post-op vitamin regimen including MVI+ 18 mg Fe two times a day, calcium citrate 400-600 mg two times a day, 0034-6007 mcg sublingual B12 daily, 5000 IU vitamin D3 daily.     Food/Nutrient Delivery:  Educated patient on eating three meals, with cutting out snacking.  Bariatric Plate: Patient and I discussed the importance of including a lean protein source (20-30 grams/meal), vegetables (included at lunch and dinner), one serving (15g) of carbohydrate, and limited added fat (1 tb/day) at each meal.   Educated patient on how to complete a food journal and benefits of meal planning.   Educated patient on using a protein powder drink as a meal replacement and/or supplement after bariatric surgery.   Discussed importance of adequate hydration after surgery, with goal of at least 64 oz of fluids/day.  Addressed avoiding all carbonated, caffeinated and sweetened drinks to  prepare for bariatric surgery.     Nutrition Counseling:  Mindful eating techniques: Encouraged slow meal pace, chewing foods to applesauce consistency for 20-30 minutes/meal.   Discussed  fluids 30 minutes before, during, and after meal to prevent dumping syndrome and discomfort post bariatric surgery.   Discussed pre/post operative diet progression, post op vitamin regimen, gave review of surgery process.     Instructions/Goals:     Start implementing bariatric surgery lifestyle modifications.    Handouts Provided:   [facility] Bariatric Surgery Nutrition Inf    Monitor/Evaluation:  Pt. s target weight: no gain from initial visit, patient verbalized understanding.     Plan for next visit:   (Final Supervised Diet visit with RD) pre/post-op  diet progression, give review of surgery process.    Video-Visit Details    Type of service:  Video Visit    Video Start Time (time video started): 3:00 pm    Video End Time (time video stopped): 3:47 pm    Originating Location (pt. Location): Home      Distant Location (provider location):  Off-site    Mode of Communication:  Video Conference via [facility]    Physician has received verbal consent for a Video Visit from the patient? Yes      Ivette Reyes RD

## 2024-04-08 NOTE — PATIENT INSTRUCTIONS
Protein Sources for Weight Loss     Making Sense of Food Labels    PREPARING FOR WEIGHT-LOSS SURGERY    Lifestyle Changes Before and After Weight Loss Surgery: Keys for Success     Diet Guidelines after Weight Loss Surgery     Tips for Weight Loss and Weight Management    Quick and Easy Meals    Salad kit with rotisserie chicken, eggs, lunch meat, beans or tuna    Chicken nuggets on top of Caesar salad kit     Lunch meat sandwich or wrap with veggies (sugar snap peas, bell pepper slices, carrot sticks, celery, sliced cucumber, cherry tomatoes, etc.)    Greek or light yogurt with a handful of nuts and fruit    Cottage cheese, cherry tomatoes and Triscuit crackers    Refried bean and cheese quesadilla on whole wheat tortilla    Can of Progresso Light or Cambells Well Yes soup - add additional leftover protein like chicken to boost protein    [place] cakes pancake or waffles with peanut butter or berries    Baked sweet potato with black beans and salsa and a side salad    Adult lunchable: lunch meat, string cheese, crackers and veggies    Tuna Cucumber Boats: cut cucumber in half, scoop out cucumber seeds, fill with mixture of tuna, light shaver, patel mustard, red onion, banana peppers, dried dill, salt and pepper    Protein pasta salad: whole wheat or bean pasta with chicken sausage, broccoli and italian dressing    Egg sandwich on english muffin: egg, slice of cheese and piece of ham    Grilled cheese and turkey sandwich with a side salad or cup of soup    BBQ Flatbread: Flat Out high protein flatbread with rotisserie chicken, BBQ sauce and red onion, topped with mozzarella cheese and baked in the oven    [place] Chicken Wrap: Rotisserie chicken warmed up with Marco A's Hot Sauce in a medium size tortilla with light ranch dressing. Add mixed greens, red onion, diced tomato, 2% shredded cheddar cheese.    Cottage cheese Pizza Bowl - mix cottage cheese, marinara, mozzarella cheese, turkey pepperonis, italian seasoning,  onions, bell peppers, heated up, serve with bell peppers and/or crackers    Cottage cheese Taco Bowl - mix cottage cheese, ground beef with taco seasoning, shredded cheese, lettuce, tomato, heated up    Greek cottage cheese bowl - mix cottage cheese, dill, vegetables (cucumber, bell pepper, cucumber, cherry tomatoes), salt and pepper, olives, feta, a little drizzle of olive oil     Whole wheat or bean pasta with pesto and chicken sausage     Sheet Pan Dinner: Chicken sausage, herbed baby potatoes, asparagus, carrots, and onions roasted in olive oil    Omelet with chicken or beans, low-fat cheese, veggies (bell pepper, tomato, spinach, mushroom, onions), and salsa    Whole wheat toast topped with mashed avocado, sliced tomato, and a fried egg     Berry Salad: Spinach/lettuce, berries, grilled chicken/salmon/tofu, low-fat feta cheese, with vinaigrette dressing     Eamon-Nakia Salad: Mixed greens, chicken breast, black beans, corn, diced tomatoes, green onions, cheddar cheese, cilantro, crushed tortilla chips, and a dressing of light ranch mixed with taco seasoning    Sweet potato, bell pepper, chickpeas, onion, and tomato sauteed in light oil with spices of choice (paprika, cumin, shant, garlic, cayenne, black pepper)    Cranberry Apple Quinoa Salad: quinoa, grilled chicken, diced apple, celery, green onion, unsweetened dried cranberries, and chopped pecans, with a dressing of olive oil, patel mustard, and honey    Asian Cabbage Salad: Sliced green and red cabbage, sliced bell pepper, edamame, shredded carrots, cilantro, slivered almonds, and grilled chicken or tofu, with shant peanut dressing      On-the-Go Breakfast Ideas  As of 2015, the latest research shows what a huge impact eating breakfast has on losing weight and feeling your best. People lose more weight when they make breakfast their biggest meal of the day compared to Dinner, but even if you cannot go to that degree, getting a breakfast that has at least 20  grams of protein and even a moderate amount of fat is ideal for maintaining good energy through the day and limits overeating in the evening hours.  The following are some quick and easy suggestions for at least getting something of substance into your body in the morning.  Enjoy!    Eating breakfast within 90 minutes of waking up is an important part of taking care of your body on a restricted calorie diet plan.  After sleeping for hours, your body is in need of fuel.  An ideal breakfast is a combination of protein, whole-grain carbohydrates, or fruit.  Here s why:    -Protein digests very slowly in the body, helping you feel more satisfied.  -Whole grains provide dietary fiber, which also digests slowly and helps keep your gut clean.  -Fruit is a great source of vitamins, minerals, and fiber.     Each one of these breakfast combinations has between 200-300 calories and 15-20 grams of protein.  Feel free to mix and match!    Bone Broth (chicken bone broth or beef bone broth) is a great way to boost protein content. 8oz of bone broth will typically have 9-12grams of protein for 40kcal of energy.    Protein: Choose  -1/2 cup low-fat cottage cheese  -2 hard boiled eggs , or one cooked in olive oil (low/slow heat).  -1 low fat string cheese stick  -1 [employer] natural peanut butter  -[employer] vegetarian sausage cynthia (found in freezer section)  -1 slice lowfat cheese  -6 oz 2% or lowfat Greek yogurt, such as Fage or Oikos.    PLUS    Whole Grains:  Choose   -1 whole wheat English muffin  -1 whole wheat dillan, half  -1/2 Fiber One frozen muffin, thawed  -1/2 Fiber One toaster pastry  -1 whole wheat bagel thin  -1/2 cup Kashi cereal  -1 Kashi waffle (or other whole grain high-fiber waffle)  Aim for whole grain/sprouted breads with at least 3g of fiber/slice if having bread. [place] is one such brand.    OR    Fruit: Choose  -1/3 cup blueberries  -1/2 banana (or a plantain- similar to a banana, yet  smaller)  -1/2 cup cantaloupe cubes  -1 small apple  -1 small orange  -1/2 cup strawberries  -handful raspberries/blackberries (each berry is about 1 calorie).    *Adapted from Diabetes Living, Fall 20    Ten Breakfasts Under 250 calories    Ideally, getting between 350-600 calories  (depending on starting height and weight)for breakfast is ideal for avoiding hunger later in the day, adjust/add to the following accordingly:    One- 250 calories, 8.5 g protein  1 slice whole-grain toast   1 Tbsp peanut butter    banana    Two- 250 calories, 8 g protein    cup nonfat/lowfat yogurt  1/3rd cup diced no-sugar peaches  1/3rd cup cereal (like Special K, Cheerios, or bran flakes)    Three- 250 calories, 25 g protein  1 egg scrambled with 1 oz skim milk    cup shredded cheddar    whole grain English muffin  1 oz [place] mayes  1 tsp margarine spread    Four- 225 calories, 25 g protein  1/2 cup Kashi Go-Lean cereal    cup skim milk mixed with 1 scoop Bariatric Advantage protein powder    cup no-sugar diced pears    Five- 250 calories, 20 g protein    cup oatmeal prepared with skim milk, 1 scoop protein powder, and sugar-free maple syrup    Six- 200 calories, 5 g protein  1 whole grain waffle, toasted  1 tablespoon creamy peanut or almond butter    Seven-  250 calories, 19 g protein  Breakfast sandwich: 1 slice whole grain toast, cut in half.  Add 1 scrambled egg and one slice cheddar  cheese.    Eight-  250 calories, 15 g protein  2 eggs scrambled with 1/3 cup frozen spinach (heat before adding to eggs) and 2 tablespoons low fat cream cheese.    Nine-  150 calories, 15 g protein  2/3rd cup cottage cheese    cup cantaloupe    Ten- 200 calories, 20 g protein  Fruit smoothie made with 4 oz. nonfat Greek yogurt,   cup berries, 1 scoop protein powder, and 4 oz skim milk.    Ten Lunches Under 250 Calories    Aim for lunch to be around 300-400 calories a day when trying to lose weight and get that protein in!    One- 200 calories,  11 g protein  1/3 cup tuna salad made with light shaver on 1 slice whole grain bread  1 small peeled apple    Two- 250 calories, 16 g protein  1/3 cup lowfat cottage cheese    cup cooked green beans    small fruit cocktail (in natural juice)    Three- 200 calories, 11 g protein    grilled cheese sandwich on whole grain bread with lowfat cheese  2/3rd cup of tomato soup    Four- 250 calories, 22 g protein  Deli wrap: 1 oz sliced turkey, 1 oz sliced ham, 1 oz sliced chicken rolled up with 1 slice low-fat cheese  1 small orange    Five- 250 calories, 28 g protein  2/3rd cup chili with 1 oz shredded cheese  4 saltine crackers    Six- 250 calories, 22 g protein  1 cup fresh spinach with 2 oz chicken, 1/3rd cup mandarin oranges, and 2 tablespoons sliced almonds with 1 tablespoon  vinaigrette dressing    Seven- 200 calories, 11 g protein  1 Tbsp sugar-free preserves and 1 Tbsp peanut butter on 1 slice whole grain toast    cup nonfat/lowfat Greek yogurt    Eight- 250 calories, 18 g protein  1 small soft-shell chicken taco with 1 oz shredded cheese, lettuce, tomato, salsa, and 1 Tbsp light sour cream    cup black beans    Nine- 225 calories, 13 g protein  2 ounces baked chicken  1/4 cup mashed potatoes    cup green beans    Ten- 200 calories, 21 g protein  Deli dillan: 2 oz roast beef or other deli meat with 1 tsp [place] mayonnaise and sliced tomato, onion, and lettuce  1/3rd cup cottage cheese      Ten Dinners Under 300 calories    If you're eating a large breakfast and medium lunch, keep dinner small.  300-400 calories is ideal for most people depending on their caloric needs.    One- 300 calories, 12 g protein  1-inch thick slice of turkey meatloaf    cup baked butternut squash    Two- 200 calories, 9 g protein  Bread-less BLT: 3 slices turkey mayes, sliced tomato, wrapped in a large lettuce leaf    cup peeled fruit    Three- 275 calories, 36 g protein  3 oz roasted chicken    cup cooked broccoli    cup shredded cheddar  cheese    cup unsweetened applesauce    Four- 200 calories, 25 g protein  3 oz baked tilapia  1/3rd cup cooked carrots    cup yogurt    Five- 250 calories, 20 g protein  Grilled ham  n  Swiss: spread 2 tsp ghee or butter on 1 slice of whole grain bread.  Cut bread in half, layer 2 oz deli ham with 1 piece of Swiss cheese and grill until cheese is melted.    cup cooked vegetables    Six- 250 calories, 18 g protein  Vegetarian cheeseburger: 1 [place] cheeseburger topped with lettuce, onion, tomato, and ketchup/mustard    cup sweet potato fries    Seven- 250 calories, 18 g protein  Pork pot roast: 2 oz roasted pork loin, 1/3rd cup roasted carrots,   medium potato, cooked with   cup gravy    Eight- 330 calories, 25 g protein  2 oz meatballs (about 2 small meatballs)    cup spaghetti sauce  1/2 piece toast topped with 1 tsp ghee or butterand topped with garlic powder, toasted in oven    Nine- 250 calories, 16 g protein  Mexican pizza: one 8  corn tortilla topped with 2 oz chicken,   cup salsa, 2 tablespoons black beans, 2 tablespoons shredded cheese.  Bake until cheese is melted.    Ten- 250 calories, 22 g protein  Shrimp stir-gibson: 3 oz cooked shrimp, 1/6th onion,   pepper,   cup chopped carrots sautéed in 1 tablespoon olive oil, topped with 2 tablespoons stir gibson sauce and a pinch of sesame seeds

## 2024-04-08 NOTE — LETTER
"    4/8/2024         RE: Brittney Moon  [address]  [address]  [facility] [address]        Dear Colleague,    Thank you for referring your patient, Brittney Moon, to the [facility]RICS [facility]ARE [place]. Please see a copy of my visit note below.    Brittney Moon is a 48 year old who is being evaluated via a billable video visit.       Initial Structured Weight Loss Supervised Diet Evaluation     Assessment:  Brittney is being seen today for initial RD nutritional evaluation. Patient has been unsuccessful with non-surgical weight loss methods and is interested in bariatric surgery. Today we reviewed current eating habits and level of physical activity, and instructed on the changes that are required for successful bariatric outcomes.    Surgery of interest per pt: DS.    Workflow review:  Support Group: Not completed.  Psychology:In progress.  Lab work:Completed.  SWL:No    Weight goal: At or below initial.    Anthropometrics:  Pt's weight is 294 lb  Initial weight: 294 lb  Weight change: -  BMI: There is no height or weight on file to calculate BMI.   Patient must be at least 60 in tall to calculate ideal body weight    Medical History:  Patient Active Problem List   Diagnosis     Type 2 diabetes mellitus without complication, without long-term current use of insulin (H)     Morbid obesity (H)     Ganglion cyst     Amputation of leg (H)     Anxiety disorder due to medical condition     Chronic low back pain     Fall at home, initial encounter     History of right knee joint replacement     Inability to bear weight     Left hip pain     Complex regional pain syndrome i of left lower limb     Opioid dependence (H)      Diabetes: yes, type 2  HbA1c:  No results found for: \"HGBA1C\"    Nutrition History  Food allergies/intolerances/cultural or religous food customs: Yes allergic to cilantro, does not eat a lot of red meat, milk doesn't sit well   Diet history: Watching portions or " calories, Exercise, Weight Watchers, Kacey Marquez, Pre packaged meals ex: Nutrisystem    Socioeconomic Status  Who does the grocery shopping for your household? self  Who prepares your meals at home? self    Diet Recall/Time  Breakfast: small bowl of oatmeal after getting home from work  Goes to sleep  Lunch: 5 pm chicken, rice  2-3 am chicken, rice    Eating out: used to eat fast food more often, 2x per month    Beverages  Dr. Pepper: working on cutting down  Water: adding water flavoring to help her cut back on soda    Exercise  Restricted to a wheel chair, she has a few wheel chair workout videos that she can do    Nutrition Diagnosis (PES statement)    Morbid obesity related to excessive calorie intake as evidenced by Intake of high caloric density foods/beverages (juice, soda, alcohol) at meals and/or snacks; large portions; Estimated intake that exceeds estimated daily energy intake; and BMI 58.43     Intervention    Nutrition Education:   Provided general overview of diet and lifestyle modifications needed to be a deemed a safe candidate for bariatric surgery.   Educated patient on how to read a food label: choosing foods with than 10 grams fat and 10 grams sugar per serving to avoid dumping syndrome.  Dumping Syndrome: Described the mechanisms of syndrome, symptoms, and prevention tools from a dietary perspective.   Vitamins: Educated on post-op vitamin regimen including MVI+ 18 mg Fe two times a day, calcium citrate 400-600 mg two times a day, 8062-6633 mcg sublingual B12 daily, 5000 IU vitamin D3 daily.     Food/Nutrient Delivery:  Educated patient on eating three meals, with cutting out snacking.  Bariatric Plate: Patient and I discussed the importance of including a lean protein source (20-30 grams/meal), vegetables (included at lunch and dinner), one serving (15g) of carbohydrate, and limited added fat (1 tb/day) at each meal.   Educated patient on how to complete a food journal and benefits of meal  planning.   Educated patient on using a protein powder drink as a meal replacement and/or supplement after bariatric surgery.   Discussed importance of adequate hydration after surgery, with goal of at least 64 oz of fluids/day.  Addressed avoiding all carbonated, caffeinated and sweetened drinks to prepare for bariatric surgery.     Nutrition Counseling:  Mindful eating techniques: Encouraged slow meal pace, chewing foods to applesauce consistency for 20-30 minutes/meal.   Discussed  fluids 30 minutes before, during, and after meal to prevent dumping syndrome and discomfort post bariatric surgery.   Discussed pre/post operative diet progression, post op vitamin regimen, gave review of surgery process.     Instructions/Goals:     Start implementing bariatric surgery lifestyle modifications.    Handouts Provided:   [facility] Bariatric Surgery Nutrition Inf    Monitor/Evaluation:  Pt. s target weight: no gain from initial visit, patient verbalized understanding.     Plan for next visit:   (Final Supervised Diet visit with RD) pre/post-op  diet progression, give review of surgery process.    Video-Visit Details    Type of service:  Video Visit    Video Start Time (time video started): 3:00 pm    Video End Time (time video stopped): 3:47 pm    Originating Location (pt. Location): Home      Distant Location (provider location):  Off-site    Mode of Communication:  Video Conference via [facility]    Physician has received verbal consent for a Video Visit from the patient? Yes      Ivette Reyes RD         Again, thank you for allowing me to participate in the care of your patient.        Sincerely,        Ivette Reyes RD

## 2024-04-22 ENCOUNTER — MYC MEDICAL ADVICE (OUTPATIENT)
Dept: SLEEP MEDICINE | Facility: CLINIC | Age: 49
End: 2024-04-22

## 2024-04-22 ENCOUNTER — TELEPHONE (OUTPATIENT)
Dept: FAMILY MEDICINE | Facility: OTHER | Age: 49
End: 2024-04-22
Payer: COMMERCIAL

## 2024-04-22 NOTE — TELEPHONE ENCOUNTER
Patient Quality Outreach    Patient is due for the following:   Diabetes -  Eye Exam and Diabetic Follow-Up Visit    Next Steps:   Schedule a office visit for Diabetes recheck appt    Type of outreach:    Sent [employer] message.      Questions for provider review:    None           Anat Mathew, CMA

## 2024-04-25 ENCOUNTER — VIRTUAL VISIT (OUTPATIENT)
Dept: SURGERY | Facility: CLINIC | Age: 49
End: 2024-04-25
Payer: COMMERCIAL

## 2024-04-25 DIAGNOSIS — E66.01 MORBID OBESITY (H): Primary | ICD-10-CM

## 2024-04-25 NOTE — PROGRESS NOTES
Virtual Visit Details    Type of service:  Video Visit     Originating Location (pt. Location): Home    Distant Location (provider location):  Off-site  Platform used for Video Visit: Aissatou    Start Time: 9:30 PM  End Time: 10:25 PM    Gavi would like to follow through with DS for health reasons. She has struggled with her weight since high school and now is concerned about various comorbidities. 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 She has a history of depression and is well medicated. She has good knowledge of surgery and good support. She will follow up and complete psychological testing. F32.9; E66.01

## 2024-04-26 DIAGNOSIS — G43.909 MIGRAINE WITHOUT STATUS MIGRAINOSUS, NOT INTRACTABLE, UNSPECIFIED MIGRAINE TYPE: ICD-10-CM

## 2024-04-26 RX ORDER — SUMATRIPTAN 25 MG/1
TABLET, FILM COATED ORAL
Qty: 18 TABLET | Refills: 1 | Status: SHIPPED | OUTPATIENT
Start: 2024-04-26 | End: 2024-06-24

## 2024-05-07 ENCOUNTER — VIRTUAL VISIT (OUTPATIENT)
Dept: SURGERY | Facility: CLINIC | Age: 49
End: 2024-05-07
Payer: COMMERCIAL

## 2024-05-07 DIAGNOSIS — E66.01 MORBID OBESITY WITH BMI OF 50.0-59.9, ADULT (H): Primary | ICD-10-CM

## 2024-05-07 PROCEDURE — 97803 MED NUTRITION INDIV SUBSEQ: CPT | Mod: 95 | Performed by: DIETITIAN, REGISTERED

## 2024-05-07 NOTE — LETTER
"    5/7/2024         RE: Brittney Moon  [address]  [address]  [facility] [address]        Dear Colleague,    Thank you for referring your patient, Brittney Moon, to the [facility] [place]. Please see a copy of my visit note below.    Brittney Moon is a 48 year old who is being evaluated via a billable video visit.      Follow Up Surgical Weight Loss Supervised Diet Evaluation    Assessment:  Pt. is being seen today for a follow up RD nutritional evaluation. Pt. has been unsuccessful with non-surgical weight loss methods and is interested in bariatric surgery. Today we reviewed current eating habits and level of physical activity, and instructed on the changes that are required for successful bariatric outcomes.    Surgery of interest per pt: DS.    Workflow review:  Support Group: Not completed.  Psychology:In progress.  Lab work:Completed.  SWL:No    Weight goal: At or below initial.    Anthropometrics:  Pt's weight is 294 lb  Initial weight: 294 lb  Weight change: -  BMI: There is no height or weight on file to calculate BMI.   Patient must be at least 60 in tall to calculate ideal body weight    Medical History:  Patient Active Problem List   Diagnosis     Type 2 diabetes mellitus without complication, without long-term current use of insulin (H)     Morbid obesity (H)     Ganglion cyst     Amputation of leg (H)     Anxiety disorder due to medical condition     Chronic low back pain     Fall at home, initial encounter     History of right knee joint replacement     Inability to bear weight     Left hip pain     Complex regional pain syndrome i of left lower limb     Opioid dependence (H)      Diabetes: yes, type 2  HbA1c:  No results found for: \"HGBA1C\"    Progress over past month: She is doing well with implementing the bariatric nutrition habits    Diet Recall/Time  Breakfast: oatmeal with a little almond milk - stopped using sugar  Lunch: PB sandwich  Snack: " apple  Dinner: chicken breast, steamed vegetables  Air popped popcorn    Meal duration: 20-45 minutes.      fluids by 30 minutes before, during meal, and waiting 30 minutes after meal before drinking fluids: Yes    Beverages  Dr. Pepper: 1 can per week  Water: 96 oz    Exercise  Reyes chair workout, 3x per week     PES statement:   Morbid obesity related to excessive calorie intake as evidenced by Intake of high caloric density foods/beverages (juice, soda, alcohol) at meals and/or snacks; large portions; Estimated intake that exceeds estimated daily energy intake; and BMI 58.43     Intervention    Nutrition Education:   Provided general overview of diet and lifestyle modifications needed to be a deemed a safe candidate for bariatric surgery.   Educated patient on how to read a food label: choosing foods with than 10 grams fat and 10 grams sugar per serving to avoid dumping syndrome.  Dumping Syndrome: Described the mechanisms of syndrome, symptoms, and prevention tools from a dietary perspective.   Vitamins: Educated on post-op vitamin regimen including MVI+ 18 mg Fe two times a day, calcium citrate 400-600 mg two times a day, 8819-7757 mcg sublingual B12 daily, 5000 IU vitamin D3 daily.     Food/Nutrient Delivery:  Educated patient on eating three meals, with cutting out snacking.  Bariatric Plate: Patient and I discussed the importance of including a lean protein source (20-30 grams/meal), vegetables (included at lunch and dinner), one serving (15g) of carbohydrate, and limited added fat (1 tb/day) at each meal.   Educated patient on how to complete a food journal and benefits of meal planning.   Educated patient on using a protein powder drink as a meal replacement and/or supplement after bariatric surgery.   Discussed importance of adequate hydration after surgery, with goal of at least 64 oz of fluids/day.  Addressed avoiding all carbonated, caffeinated and sweetened drinks to prepare for bariatric  surgery.     Nutrition Counseling:  Mindful eating techniques: Encouraged slow meal pace, chewing foods to applesauce consistency for 20-30 minutes/meal.   Discussed  fluids 30 minutes before, during, and after meal to prevent dumping syndrome and discomfort post bariatric surgery.   Discussed pre/post operative diet progression, post op vitamin regimen, gave review of surgery process.     Instructions/Goals:     Continue implementing bariatric surgery lifestyle modifications.    Monitor/Evaluation:  Pt. s target weight:  no gain from initial visit, pt. verbalized understanding.     Pt has completed all nutrition requirements and is well-informed of the dietary and physical activity requirements that are necessary for successful bariatric outcomes. This pt is an appropriate candidate for surgery from a nutrition standpoint at this time. The patient understands that surgery is a tool, not a cure, and post operative follow up is essential.    Video-Visit Details    Type of service:  Video Visit    Video Start Time (time video started): 9:30 am    Video End Time (time video stopped): 10:00 am    Originating Location (pt. Location): Home      Distant Location (provider location):  Off-site    Mode of Communication:  Video Conference via [facility]    Physician has received verbal consent for a Video Visit from the patient? Yes      Ivette Reyes RD          Again, thank you for allowing me to participate in the care of your patient.        Sincerely,        Ivette Reyes RD

## 2024-05-07 NOTE — PROGRESS NOTES
"Brittney Moon is a 48 year old who is being evaluated via a billable video visit.      Follow Up Surgical Weight Loss Supervised Diet Evaluation    Assessment:  Pt. is being seen today for a follow up RD nutritional evaluation. Pt. has been unsuccessful with non-surgical weight loss methods and is interested in bariatric surgery. Today we reviewed current eating habits and level of physical activity, and instructed on the changes that are required for successful bariatric outcomes.    Surgery of interest per pt: DS.    Workflow review:  Support Group: Not completed.  Psychology:In progress.  Lab work:Completed.  SWL:No    Weight goal: At or below initial.    Anthropometrics:  Pt's weight is 294 lb  Initial weight: 294 lb  Weight change: -  BMI: There is no height or weight on file to calculate BMI.   Patient must be at least 60 in tall to calculate ideal body weight    Medical History:  Patient Active Problem List   Diagnosis    Type 2 diabetes mellitus without complication, without long-term current use of insulin (H)    Morbid obesity (H)    Ganglion cyst    Amputation of leg (H)    Anxiety disorder due to medical condition    Chronic low back pain    Fall at home, initial encounter    History of right knee joint replacement    Inability to bear weight    Left hip pain    Complex regional pain syndrome i of left lower limb    Opioid dependence (H)      Diabetes: yes, type 2  HbA1c:  No results found for: \"HGBA1C\"    Progress over past month: She is doing well with implementing the bariatric nutrition habits    Diet Recall/Time  Breakfast: oatmeal with a little almond milk - stopped using sugar  Lunch: PB sandwich  Snack: apple  Dinner: chicken breast, steamed vegetables  Air popped popcorn    Meal duration: 20-45 minutes.      fluids by 30 minutes before, during meal, and waiting 30 minutes after meal before drinking fluids: Yes    Beverages  Dr. Pepper: 1 can per week  Water: 96 oz    Exercise  Reyes " chair workout, 3x per week     PES statement:   Morbid obesity related to excessive calorie intake as evidenced by Intake of high caloric density foods/beverages (juice, soda, alcohol) at meals and/or snacks; large portions; Estimated intake that exceeds estimated daily energy intake; and BMI 58.43     Intervention    Nutrition Education:   Provided general overview of diet and lifestyle modifications needed to be a deemed a safe candidate for bariatric surgery.   Educated patient on how to read a food label: choosing foods with than 10 grams fat and 10 grams sugar per serving to avoid dumping syndrome.  Dumping Syndrome: Described the mechanisms of syndrome, symptoms, and prevention tools from a dietary perspective.   Vitamins: Educated on post-op vitamin regimen including MVI+ 18 mg Fe two times a day, calcium citrate 400-600 mg two times a day, 5553-1218 mcg sublingual B12 daily, 5000 IU vitamin D3 daily.     Food/Nutrient Delivery:  Educated patient on eating three meals, with cutting out snacking.  Bariatric Plate: Patient and I discussed the importance of including a lean protein source (20-30 grams/meal), vegetables (included at lunch and dinner), one serving (15g) of carbohydrate, and limited added fat (1 tb/day) at each meal.   Educated patient on how to complete a food journal and benefits of meal planning.   Educated patient on using a protein powder drink as a meal replacement and/or supplement after bariatric surgery.   Discussed importance of adequate hydration after surgery, with goal of at least 64 oz of fluids/day.  Addressed avoiding all carbonated, caffeinated and sweetened drinks to prepare for bariatric surgery.     Nutrition Counseling:  Mindful eating techniques: Encouraged slow meal pace, chewing foods to applesauce consistency for 20-30 minutes/meal.   Discussed  fluids 30 minutes before, during, and after meal to prevent dumping syndrome and discomfort post bariatric surgery.    Discussed pre/post operative diet progression, post op vitamin regimen, gave review of surgery process.     Instructions/Goals:     Continue implementing bariatric surgery lifestyle modifications.    Monitor/Evaluation:  Pt. s target weight:  no gain from initial visit, pt. verbalized understanding.     Pt has completed all nutrition requirements and is well-informed of the dietary and physical activity requirements that are necessary for successful bariatric outcomes. This pt is an appropriate candidate for surgery from a nutrition standpoint at this time. The patient understands that surgery is a tool, not a cure, and post operative follow up is essential.    Video-Visit Details    Type of service:  Video Visit    Video Start Time (time video started): 9:30 am    Video End Time (time video stopped): 10:00 am    Originating Location (pt. Location): Home      Distant Location (provider location):  Off-site    Mode of Communication:  Video Conference via [facility]    Physician has received verbal consent for a Video Visit from the patient? Yes      Ivette Reyes RD

## 2024-05-09 ENCOUNTER — TRANSFERRED RECORDS (OUTPATIENT)
Dept: HEALTH INFORMATION MANAGEMENT | Facility: CLINIC | Age: 49
End: 2024-05-09

## 2024-05-09 ENCOUNTER — VIRTUAL VISIT (OUTPATIENT)
Dept: SURGERY | Facility: CLINIC | Age: 49
End: 2024-05-09
Payer: COMMERCIAL

## 2024-05-09 DIAGNOSIS — E66.01 MORBID OBESITY (H): Primary | ICD-10-CM

## 2024-05-09 NOTE — PROGRESS NOTES
Virtual Visit Details    Type of service:  Video Visit     Originating Location (pt. Location): Home    Distant Location (provider location):  Off-site  Platform used for Video Visit: Zoom (Telehealth)    Start Time: 8:45 AM  End Time: 9:28 AM    Gavi has made quality changes in eating and lifestyle and appears more mindful about her eating. She is now ready to proceed with surgery. A report was sent to the clinic. F32.9; E66.01

## 2024-05-10 NOTE — CONSULTS
Care Management Initial Consult    General Information  Assessment completed with: Patient,    Type of CM/SW Visit: Initial Assessment    Primary Care Provider verified and updated as needed: Yes   Readmission within the last 30 days: no previous admission in last 30 days      Reason for Consult: other (see comments) (Discharge Planning)  Advance Care Planning: Advance Care Planning Reviewed: no concerns identified        Communication Assessment  Patient's communication style: spoken language (English or Bilingual)    Hearing Difficulty or Deaf: no      Cognitive  Cognitive/Neuro/Behavioral: WDL                      Living Environment:   People in home: alone     Current living Arrangements: mobile home      Able to return to prior arrangements: yes     Family/Social Support:  Care provided by: self  Provides care for: pet(s)  Marital Status: Single  Neighbor, Other (specify) (family)          Description of Support System: Supportive, Involved    Support Assessment: Adequate family and caregiver support    Current Resources:   Patient receiving home care services: No     Community Resources: None  Equipment currently used at home: commode chair, grab bar, toilet, grab bar, tub/shower, shower chair, wheelchair, power  Supplies currently used at home: None    Employment/Financial:  Employment Status: employed full-time        Financial Concerns: No concerns identified   Referral to Financial Worker: No     Lifestyle & Psychosocial Needs:  Social Determinants of Health     Tobacco Use: Low Risk      Smoking Tobacco Use: Never     Smokeless Tobacco Use: Never     Passive Exposure: Not on file   Alcohol Use: Not on file   Financial Resource Strain: Not on file   Food Insecurity: Not on file   Transportation Needs: Not on file   Physical Activity: Not on file   Stress: Not on file   Social Connections: Not on file   Intimate Partner Violence: Not on file   Depression: Not at risk     PHQ-2 Score: 0   Housing Stability:  Not on file     Functional Status:  Prior to admission patient needed assistance:   Dependent ADLs:: Wheelchair-independent  Dependent IADLs:: Independent     Mental Health Status:  Mental Health Status: No Current Concerns  Mental Health Management: Medication    Chemical Dependency Status:  Chemical Dependency Status: No Current Concerns           Values/Beliefs:  Spiritual, Cultural Beliefs, Gnosticist Practices, Values that affect care: no             Additional Information:  Per H&P, patient is a very pleasant 46 year old female patient who works in the [employer] department with a past medical history significant for seizure disorder, T2DM, above knee amputation LLE (childhood), chronic back pain, chronic pain related to amputation with spinal stimulator in place, s/p cholecystectomy who presented to OSH with concerns regarding R foot and ankle pain after the area was run over by her wheelchair; patient transferred to [facility] secondary to no beds at OSH.  Writer met with patient in room due to a consult for discharge planning. Writer introduced self, role and duties to patient. Patient reports that she is independent with IADLs and ADLs. She is employed full time in the [employer] office. She lives in a mobile home. She uses a power chair and has a commode and shower chair in the home. She denies any financial concerns or chemical dependency issues. She states that her mood is stable with medications. She states that she has a strong support system. She states that she is open to having home health care in the home and prefers [facility]. Writer confirmed primary care physician on file. Patient declined having a Healthcare Directive and prefers not to complete. RNCC to follow for home going needs.    VARSHA Velasco, MSW  Adult Acute Care Float   Pager [phone]       [Dear  ___] : Dear  [unfilled], [Consult Letter:] : I had the pleasure of evaluating your patient, [unfilled]. [Please see my note below.] : Please see my note below. [Consult Closing:] : Thank you very much for allowing me to participate in the care of this patient.  If you have any questions, please do not hesitate to contact me. [Sincerely,] : Sincerely, [FreeTextEntry2] : Dr. Marco A Dominguez [FreeTextEntry3] : Christopher Contreras MD, MMSc, FACS\par  Pediatric Otolaryngology\par  [facility]\par  [facility]\par  [address]\[place] [address]\par

## 2024-06-04 ENCOUNTER — HOSPITAL ENCOUNTER (EMERGENCY)
Facility: CLINIC | Age: 49
Discharge: HOME | End: 2024-06-05
Attending: STUDENT IN AN ORGANIZED HEALTH CARE EDUCATION/TRAINING PROGRAM | Admitting: STUDENT IN AN ORGANIZED HEALTH CARE EDUCATION/TRAINING PROGRAM
Payer: COMMERCIAL

## 2024-06-04 DIAGNOSIS — M79.622 PAIN OF LEFT UPPER ARM: ICD-10-CM

## 2024-06-04 DIAGNOSIS — R07.89 LEFT-SIDED CHEST WALL PAIN: ICD-10-CM

## 2024-06-04 PROCEDURE — 250N000013 HC RX MED GY IP 250 OP 250 PS 637: Performed by: STUDENT IN AN ORGANIZED HEALTH CARE EDUCATION/TRAINING PROGRAM

## 2024-06-04 PROCEDURE — 99284 EMERGENCY DEPT VISIT MOD MDM: CPT | Performed by: STUDENT IN AN ORGANIZED HEALTH CARE EDUCATION/TRAINING PROGRAM

## 2024-06-04 PROCEDURE — 93010 ELECTROCARDIOGRAM REPORT: CPT | Performed by: STUDENT IN AN ORGANIZED HEALTH CARE EDUCATION/TRAINING PROGRAM

## 2024-06-04 PROCEDURE — 99285 EMERGENCY DEPT VISIT HI MDM: CPT | Mod: 25 | Performed by: STUDENT IN AN ORGANIZED HEALTH CARE EDUCATION/TRAINING PROGRAM

## 2024-06-04 PROCEDURE — 93005 ELECTROCARDIOGRAM TRACING: CPT | Performed by: STUDENT IN AN ORGANIZED HEALTH CARE EDUCATION/TRAINING PROGRAM

## 2024-06-04 RX ORDER — HYDROMORPHONE HYDROCHLORIDE 1 MG/ML
0.5 INJECTION, SOLUTION INTRAMUSCULAR; INTRAVENOUS; SUBCUTANEOUS
Status: COMPLETED | OUTPATIENT
Start: 2024-06-04 | End: 2024-06-05

## 2024-06-04 RX ORDER — ASPIRIN 81 MG/1
324 TABLET, CHEWABLE ORAL ONCE
Status: COMPLETED | OUTPATIENT
Start: 2024-06-04 | End: 2024-06-04

## 2024-06-04 RX ORDER — ONDANSETRON 2 MG/ML
4 INJECTION INTRAMUSCULAR; INTRAVENOUS EVERY 30 MIN PRN
Status: DISCONTINUED | OUTPATIENT
Start: 2024-06-04 | End: 2024-06-05 | Stop reason: HOSPADM

## 2024-06-04 RX ADMIN — ASPIRIN 324 MG: 81 TABLET, CHEWABLE ORAL at 23:50

## 2024-06-04 ASSESSMENT — COLUMBIA-SUICIDE SEVERITY RATING SCALE - C-SSRS
6. HAVE YOU EVER DONE ANYTHING, STARTED TO DO ANYTHING, OR PREPARED TO DO ANYTHING TO END YOUR LIFE?: YES
1. IN THE PAST MONTH, HAVE YOU WISHED YOU WERE DEAD OR WISHED YOU COULD GO TO SLEEP AND NOT WAKE UP?: NO
2. HAVE YOU ACTUALLY HAD ANY THOUGHTS OF KILLING YOURSELF IN THE PAST MONTH?: NO

## 2024-06-04 NOTE — Clinical Note
Brittney Moon was seen and treated in our emergency department on 6/4/2024.  She may return to work on 06/07/2024.       If you have any questions or concerns, please don't hesitate to call.      Hesham Ramírez MD

## 2024-06-05 ENCOUNTER — APPOINTMENT (OUTPATIENT)
Dept: CT IMAGING | Facility: CLINIC | Age: 49
End: 2024-06-05
Attending: STUDENT IN AN ORGANIZED HEALTH CARE EDUCATION/TRAINING PROGRAM
Payer: COMMERCIAL

## 2024-06-05 ENCOUNTER — APPOINTMENT (OUTPATIENT)
Dept: GENERAL RADIOLOGY | Facility: CLINIC | Age: 49
End: 2024-06-05
Attending: STUDENT IN AN ORGANIZED HEALTH CARE EDUCATION/TRAINING PROGRAM
Payer: COMMERCIAL

## 2024-06-05 VITALS
RESPIRATION RATE: 14 BRPM | SYSTOLIC BLOOD PRESSURE: 111 MMHG | HEIGHT: 60 IN | TEMPERATURE: 98.1 F | OXYGEN SATURATION: 91 % | HEART RATE: 73 BPM | DIASTOLIC BLOOD PRESSURE: 77 MMHG | BODY MASS INDEX: 57.52 KG/M2 | WEIGHT: 293 LBS

## 2024-06-05 LAB
ALBUMIN SERPL BCG-MCNC: 3.5 G/DL (ref 3.5–5.2)
ALP SERPL-CCNC: 101 U/L (ref 40–150)
ALT SERPL W P-5'-P-CCNC: 12 U/L (ref 0–50)
ANION GAP SERPL CALCULATED.3IONS-SCNC: 7 MMOL/L (ref 7–15)
AST SERPL W P-5'-P-CCNC: 12 U/L (ref 0–45)
BASOPHILS # BLD AUTO: 0 10E3/UL (ref 0–0.2)
BASOPHILS NFR BLD AUTO: 1 %
BILIRUB SERPL-MCNC: 0.2 MG/DL
BUN SERPL-MCNC: 16.1 MG/DL (ref 6–20)
CALCIUM SERPL-MCNC: 8.7 MG/DL (ref 8.6–10)
CHLORIDE SERPL-SCNC: 107 MMOL/L (ref 98–107)
CREAT SERPL-MCNC: 0.93 MG/DL (ref 0.51–0.95)
D DIMER PPP FEU-MCNC: 0.63 UG/ML FEU (ref 0–0.5)
DEPRECATED HCO3 PLAS-SCNC: 25 MMOL/L (ref 22–29)
EGFRCR SERPLBLD CKD-EPI 2021: 75 ML/MIN/1.73M2
EOSINOPHIL # BLD AUTO: 0.4 10E3/UL (ref 0–0.7)
EOSINOPHIL NFR BLD AUTO: 5 %
ERYTHROCYTE [DISTWIDTH] IN BLOOD BY AUTOMATED COUNT: 14 % (ref 10–15)
GLUCOSE SERPL-MCNC: 180 MG/DL (ref 70–99)
HCT VFR BLD AUTO: 40.5 % (ref 35–47)
HGB BLD-MCNC: 12.7 G/DL (ref 11.7–15.7)
IMM GRANULOCYTES # BLD: 0.1 10E3/UL
IMM GRANULOCYTES NFR BLD: 1 %
LIPASE SERPL-CCNC: 22 U/L (ref 13–60)
LYMPHOCYTES # BLD AUTO: 1.9 10E3/UL (ref 0.8–5.3)
LYMPHOCYTES NFR BLD AUTO: 22 %
MCH RBC QN AUTO: 28.4 PG (ref 26.5–33)
MCHC RBC AUTO-ENTMCNC: 31.4 G/DL (ref 31.5–36.5)
MCV RBC AUTO: 91 FL (ref 78–100)
MONOCYTES # BLD AUTO: 0.5 10E3/UL (ref 0–1.3)
MONOCYTES NFR BLD AUTO: 6 %
NEUTROPHILS # BLD AUTO: 5.5 10E3/UL (ref 1.6–8.3)
NEUTROPHILS NFR BLD AUTO: 65 %
NRBC # BLD AUTO: 0 10E3/UL
NRBC BLD AUTO-RTO: 0 /100
PLATELET # BLD AUTO: 287 10E3/UL (ref 150–450)
POTASSIUM SERPL-SCNC: 4.3 MMOL/L (ref 3.4–5.3)
PROT SERPL-MCNC: 6.5 G/DL (ref 6.4–8.3)
RBC # BLD AUTO: 4.47 10E6/UL (ref 3.8–5.2)
SODIUM SERPL-SCNC: 139 MMOL/L (ref 135–145)
TROPONIN T SERPL HS-MCNC: 9 NG/L
WBC # BLD AUTO: 8.5 10E3/UL (ref 4–11)

## 2024-06-05 PROCEDURE — 73630 X-RAY EXAM OF FOOT: CPT | Mod: RT

## 2024-06-05 PROCEDURE — 80053 COMPREHEN METABOLIC PANEL: CPT | Performed by: STUDENT IN AN ORGANIZED HEALTH CARE EDUCATION/TRAINING PROGRAM

## 2024-06-05 PROCEDURE — 85025 COMPLETE CBC W/AUTO DIFF WBC: CPT | Performed by: STUDENT IN AN ORGANIZED HEALTH CARE EDUCATION/TRAINING PROGRAM

## 2024-06-05 PROCEDURE — 83690 ASSAY OF LIPASE: CPT | Performed by: STUDENT IN AN ORGANIZED HEALTH CARE EDUCATION/TRAINING PROGRAM

## 2024-06-05 PROCEDURE — 85379 FIBRIN DEGRADATION QUANT: CPT | Performed by: STUDENT IN AN ORGANIZED HEALTH CARE EDUCATION/TRAINING PROGRAM

## 2024-06-05 PROCEDURE — 250N000011 HC RX IP 250 OP 636: Performed by: STUDENT IN AN ORGANIZED HEALTH CARE EDUCATION/TRAINING PROGRAM

## 2024-06-05 PROCEDURE — 36415 COLL VENOUS BLD VENIPUNCTURE: CPT | Performed by: STUDENT IN AN ORGANIZED HEALTH CARE EDUCATION/TRAINING PROGRAM

## 2024-06-05 PROCEDURE — 96376 TX/PRO/DX INJ SAME DRUG ADON: CPT | Mod: 59 | Performed by: STUDENT IN AN ORGANIZED HEALTH CARE EDUCATION/TRAINING PROGRAM

## 2024-06-05 PROCEDURE — 96374 THER/PROPH/DIAG INJ IV PUSH: CPT | Mod: 59 | Performed by: STUDENT IN AN ORGANIZED HEALTH CARE EDUCATION/TRAINING PROGRAM

## 2024-06-05 PROCEDURE — 71275 CT ANGIOGRAPHY CHEST: CPT

## 2024-06-05 PROCEDURE — 250N000009 HC RX 250: Performed by: STUDENT IN AN ORGANIZED HEALTH CARE EDUCATION/TRAINING PROGRAM

## 2024-06-05 PROCEDURE — 96375 TX/PRO/DX INJ NEW DRUG ADDON: CPT | Mod: 59 | Performed by: STUDENT IN AN ORGANIZED HEALTH CARE EDUCATION/TRAINING PROGRAM

## 2024-06-05 PROCEDURE — 84484 ASSAY OF TROPONIN QUANT: CPT | Performed by: STUDENT IN AN ORGANIZED HEALTH CARE EDUCATION/TRAINING PROGRAM

## 2024-06-05 PROCEDURE — 72125 CT NECK SPINE W/O DYE: CPT

## 2024-06-05 RX ORDER — IOPAMIDOL 755 MG/ML
500 INJECTION, SOLUTION INTRAVASCULAR ONCE
Status: COMPLETED | OUTPATIENT
Start: 2024-06-05 | End: 2024-06-05

## 2024-06-05 RX ORDER — HYDROMORPHONE HYDROCHLORIDE 1 MG/ML
0.5 INJECTION, SOLUTION INTRAMUSCULAR; INTRAVENOUS; SUBCUTANEOUS
Status: COMPLETED | OUTPATIENT
Start: 2024-06-05 | End: 2024-06-05

## 2024-06-05 RX ADMIN — HYDROMORPHONE HYDROCHLORIDE 0.5 MG: 1 INJECTION, SOLUTION INTRAMUSCULAR; INTRAVENOUS; SUBCUTANEOUS at 01:43

## 2024-06-05 RX ADMIN — ONDANSETRON 4 MG: 2 INJECTION INTRAMUSCULAR; INTRAVENOUS at 00:02

## 2024-06-05 RX ADMIN — SODIUM CHLORIDE 70 ML: 9 INJECTION, SOLUTION INTRAVENOUS at 01:20

## 2024-06-05 RX ADMIN — HYDROMORPHONE HYDROCHLORIDE 0.5 MG: 1 INJECTION, SOLUTION INTRAMUSCULAR; INTRAVENOUS; SUBCUTANEOUS at 00:04

## 2024-06-05 RX ADMIN — IOPAMIDOL 77 ML: 755 INJECTION, SOLUTION INTRAVENOUS at 01:20

## 2024-06-05 ASSESSMENT — ACTIVITIES OF DAILY LIVING (ADL)
ADLS_ACUITY_SCORE: 40

## 2024-06-05 NOTE — DISCHARGE INSTRUCTIONS
Your testing today was all very reassuring.  I do not see any signs of heart or lung stress.    The CT scans of your chest and neck were normal as well.    I do not see any signs of fracture on your foot.  Keep it elevated is much as possible and apply ice multiple times per day.    I think your pain is probably due to a muscle spasm/inflammation.  I recommend Tylenol/ibuprofen, existing prescriptions for Robaxin and Norco.  You can also try heat/ice, lidocaine patches, IcyHot, massage.    Follow-up with your primary care doctor for recheck.  Return to the emergency department sooner for any new or acutely worsening symptoms.

## 2024-06-05 NOTE — ED TRIAGE NOTES
Patient present with chest pain that started around 6 pm tonight. She reports some left arm numbness that started first. Rates chest pain at 8/10.      Triage Assessment (Adult)       Row Name 06/04/24 [id number]          Triage Assessment    Airway WDL WDL        Respiratory WDL    Respiratory WDL WDL        Skin Circulation/Temperature WDL    Skin Circulation/Temperature WDL WDL        Cardiac WDL    Cardiac WDL X;chest pain        Chest Pain Assessment    Chest Pain Location anterior chest, left     Chest Pain Radiation arm     Character pressure     Chest Pain Intervention cardiac monitoring continued;cardiac monitor placed        Peripheral/Neurovascular WDL    Peripheral Neurovascular WDL WDL        Cognitive/Neuro/Behavioral WDL    Cognitive/Neuro/Behavioral WDL WDL
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
## 2024-06-07 ENCOUNTER — VIRTUAL VISIT (OUTPATIENT)
Dept: SLEEP MEDICINE | Facility: CLINIC | Age: 49
End: 2024-06-07
Attending: NURSE PRACTITIONER
Payer: COMMERCIAL

## 2024-06-07 VITALS — BODY MASS INDEX: 57.52 KG/M2 | HEIGHT: 60 IN | WEIGHT: 293 LBS

## 2024-06-07 DIAGNOSIS — R06.83 SNORING: ICD-10-CM

## 2024-06-07 DIAGNOSIS — E66.01 MORBID OBESITY (H): ICD-10-CM

## 2024-06-07 DIAGNOSIS — G90.522 COMPLEX REGIONAL PAIN SYNDROME I OF LEFT LOWER LIMB: ICD-10-CM

## 2024-06-07 DIAGNOSIS — G47.00 PERSISTENT INSOMNIA: Primary | ICD-10-CM

## 2024-06-07 PROCEDURE — 99204 OFFICE O/P NEW MOD 45 MIN: CPT | Mod: 95 | Performed by: INTERNAL MEDICINE

## 2024-06-07 ASSESSMENT — PAIN SCALES - GENERAL: PAINLEVEL: SEVERE PAIN (7)

## 2024-06-07 ASSESSMENT — SLEEP AND FATIGUE QUESTIONNAIRES
HOW LIKELY ARE YOU TO NOD OFF OR FALL ASLEEP WHILE SITTING INACTIVE IN A PUBLIC PLACE: WOULD NEVER DOZE
HOW LIKELY ARE YOU TO NOD OFF OR FALL ASLEEP WHILE SITTING QUIETLY AFTER LUNCH WITHOUT ALCOHOL: WOULD NEVER DOZE
HOW LIKELY ARE YOU TO NOD OFF OR FALL ASLEEP WHILE SITTING AND TALKING TO SOMEONE: WOULD NEVER DOZE
HOW LIKELY ARE YOU TO NOD OFF OR FALL ASLEEP WHEN YOU ARE A PASSENGER IN A CAR FOR AN HOUR WITHOUT A BREAK: HIGH CHANCE OF DOZING
HOW LIKELY ARE YOU TO NOD OFF OR FALL ASLEEP WHILE WATCHING TV: SLIGHT CHANCE OF DOZING
HOW LIKELY ARE YOU TO NOD OFF OR FALL ASLEEP WHILE SITTING AND READING: SLIGHT CHANCE OF DOZING
HOW LIKELY ARE YOU TO NOD OFF OR FALL ASLEEP IN A CAR, WHILE STOPPED FOR A FEW MINUTES IN TRAFFIC: WOULD NEVER DOZE
HOW LIKELY ARE YOU TO NOD OFF OR FALL ASLEEP WHILE LYING DOWN TO REST IN THE AFTERNOON WHEN CIRCUMSTANCES PERMIT: SLIGHT CHANCE OF DOZING

## 2024-06-07 NOTE — PROGRESS NOTES
Virtual Visit Details    Type of service:  Video Visit     Originating Location (pt. Location): Home  Distant Location (provider location):  Off-site  Platform used for Video Visit: [employer]    Additional 15 minutes on the date of service was spent performing the following:    -Preparing to see the patient  -Obtaining and/or reviewing separately obtained history   -Ordering medications, tests, or procedures   -Documenting clinical information in the electronic or other health record     Thank you for the opportunity to participate in the care of  Brittney Moon.    Assessment and Plan:    In summary Brittney Moon is a 48 year old year old female here for sleep disturbance.  1. Persistent Insomnia/Snoring/Morbid Obesity/CRPS I   Brittney Moon has high risk for obstructive sleep apnea based on the history of Persistent Insomnia, snoring, Morbid Obesity and a crowded airway. I educated the patient on the underlying pathophysiology of obstructive sleep apnea. We reviewed the risks associated with sleep apnea, including increased cardiovascular risk and overall death. We talked about treatments briefly. I recommend getting an split-night nocturnal polysomnography. The patient should return to the clinic to discuss results and treatment option in a patient-centered approach.    The patient has given me permission to put in an order for CPAP if her sleep study is positive for RHONDA with [facility].    Lab reviewed: Discussed with patient.    History of present illness:    She is a 48 year old female who comes to the virtual clinic with a chief complaint of sleep maintenance insomnia has been going on for approximate 3 to 4 years.  While the patient denies any episodes of excessive daytime sleepiness or witnessed apnea, she has been told that she does have loud snoring during sleep.  The patient is a candidate for bariatric surgery and getting ruled out for obstructive sleep apnea as part of the process to qualify  her for surgery.  Complicating matters further is the fact that the patient is on chronic opioids for the treatment of CRPS.     Ideal Sleep-Wake Cycle(devoid of societal pressure):    Patient would try to initiate sleep at around 11-12 at night. Final wake up time is around 8 AM.    TIME IN BED:    1) Work/School Days:    Do you work or go to school? Yes   What time do you usually get into bed? 11:00 pm   About how long does it take you to fall asleep? Less than 10 minutes   How often do you have trouble falling asleep? Less than 1   How often do you wake up during the night? 2 times   Do you work days/evenings/nights/rotating shifts? Nights   What wakes you up at night? Pain    Use the bathroom   How often do you have trouble falling back to sleep? Less than one   About how long does it take to fall back to sleep? 5 minutes   What do you usually do if you have trouble getting back to sleep? [place]   What time do you usually get out of bed to start your day? 8 am   Do you use an alarm? Yes   2) Weekends/Non-work Days/All Other Days    What time do you usually get into bed? Midnight   About how long does it take you to fall asleep? 10 minutes   What time do you usually get out of bed to start your day? 8:00   Do you use an alarm? Yes   SLEEP NEED    On average, about how much sleep do you think you get? 7-8 hours   About how much sleep do you think you need? 7-8 hours   SLEEP POSITION    Which sleep positions do you prefer? Back    Side    Stomach    Head Elevated    Recliner   Do you do any of the following activities in bed? Read   How often do you take a nap on purpose? One day   About how long are your naps? Couple hours   Do you feel better after naps? Yes   How often do you doze off unintentionally? One to two days   Have you ever had a driving accident or near-miss due to sleepiness/drowsiness? No       Stop Bang Questionnaire    Question 6/7/2024 11:11 AM CDT - Filed by Sugey Camarena 3/7/2024  2:43 PM CDT -  Filed by Yolanda Mercedes MA   Do you SNORE loudly (louder than talking or loud enough to be heard through closed doors)? No Yes   Do you often feel TIRED, fatigued, or sleepy during daytime? No Yes   Has anyone OBSERVED you stop breathing during your sleep? No Yes   Do you have or are you being treated for high blood PRESSURE? No No   BMI more than 35kg/m2? Yes Yes   AGE over 50 years old? No Yes   NECK circumference > 16 inches (40cm)? Yes Yes   GENDER: Male? No No   STOP-BANG Total Score (range: 0 - 8) 3 (High risk of RHONDA) Critical  7 (High risk of RHONDA) Critical        JEAN:  JEAN Total Score: 4  Total score - [place]: 6 (6/7/2024 11:10 AM)    Patient told to return in one week after the sleep study is interpreted.    Patient Active Problem List   Diagnosis    Type 2 diabetes mellitus without complication, without long-term current use of insulin (H)    Morbid obesity (H)    Ganglion cyst    Amputation of leg (H)    Anxiety disorder due to medical condition    Chronic low back pain    Fall at home, initial encounter    History of right knee joint replacement    Inability to bear weight    Left hip pain    Complex regional pain syndrome i of left lower limb    Opioid dependence (H)     Past Medical History:   Diagnosis Date    Back pain     Migraine     Nephrolithiasis     Osteoarthritis     Other convulsions     seizure disorder    Unspecified osteomyelitis, lower leg      Past Surgical History:   Procedure Laterality Date    CHOLECYSTECTOMY, LAPOROSCOPIC  1995    COLONOSCOPY N/A 10/18/2022    Procedure: COLONOSCOPY, WITH POLYPECTOMY;  Surgeon: Micah Reno MD;  Location:  GI    COMBINED CYSTOSCOPY, LASER HOLMIUM LITHOTRIPSY URETER(S)      EXCISE GANGLION WRIST Left 12/24/2018    Procedure: EXCISION LEFT WRIST GANGLION CYST;  Surgeon: Kehinde Pino DO;  Location: [facility] OR    INGUINAL HERNIA REPAIR Right     INJECT FACET JOINT Bilateral 11/29/2019    Procedure: lumbar sacral facet injection bilateral  4-5 sacral 1;  Surgeon: Canelo Zamora MD;  Location: [facility] OR    TOTAL KNEE ARTHROPLASTY Right 2012    WISDOM TOOTH EXTRACTION      ZZC AMPUTATION LOW LEG THRU TIB/FIB      Below knee amputation secondary to osteomyelitis     Current Outpatient Medications   Medication Sig Dispense Refill    acetaminophen (TYLENOL) 500 MG tablet 1 tablet as needed Orally every 6 hrs      cyanocobalamin (VITAMIN B-12) 1000 MCG tablet Take 1,000 mcg by mouth daily      FLUoxetine (PROZAC) 40 MG capsule Take 80 mg by mouth every morning      HYDROcodone-acetaminophen (NORCO) 5-325 MG tablet 0.5 to 1 tablet as needed Orally (ok to fill 05/25/2023) every 8-12 hrs (max 2 tabs/day) for 30 days      metFORMIN (GLUCOPHAGE XR) 500 MG 24 hr tablet TAKE ONE TABLET BY MOUTH TWICE A DAY WITH MEALS 180 tablet 0    methocarbamol (ROBAXIN) 500 MG tablet TAKE ONE TABLET BY MOUTH FOUR TIMES A DAY AS NEEDED FOR MUSCLE SPASMS 120 tablet 3    simvastatin (ZOCOR) 5 MG tablet TAKE ONE TABLET (5MG) BY MOUTH AT BEDTIME 90 tablet 3    SUMAtriptan (IMITREX) 25 MG tablet TAKE ONE TABLET BY MOUTH AT ONSET OF HEADACHE FOR MIGRAINE, MAY REPEAT DOSE AFTER 2 HOURS IF NEEDED. DO NOT TAKE MORE THAN 200MG IN 24 HOURS. 18 tablet 1    topiramate (TOPAMAX) 100 MG tablet Take 100 mg by mouth 2 times daily Take with 50 mg for totally dose of 150 mg twice daily      topiramate (TOPAMAX) 50 MG tablet Take 50 mg by mouth 2 times daily Take with 100 mg tablet for total dose of 150 mg twice daily      Vitamin D3 (CHOLECALCIFEROL) 25 mcg (1000 units) tablet Take 25 mcg by mouth daily       Food, Codeine, Hydromorphone, and Coriandrum sativum  Social History     Socioeconomic History    Marital status: Single     Spouse name: Not on file    Number of children: Not on file    Years of education: Not on file    Highest education level: Not on file   Occupational History    Not on file   Tobacco Use    Smoking status: Never    Smokeless tobacco: Never   Vaping Use    Vaping status:  Never Used   Substance and Sexual Activity    Alcohol use: Yes     Comment: rare    Drug use: No    Sexual activity: Never   Other Topics Concern    Parent/sibling w/ CABG, MI or angioplasty before 65F 55M? Not Asked   Social History Narrative    Not on file     Social Determinants of Health     Financial Resource Strain: Low Risk  (10/10/2023)    Financial Resource Strain     Within the past 12 months, have you or your family members you live with been unable to get utilities (heat, electricity) when it was really needed?: No   Food Insecurity: Low Risk  (10/10/2023)    Food Insecurity     Within the past 12 months, did you worry that your food would run out before you got money to buy more?: No     Within the past 12 months, did the food you bought just not last and you didn t have money to get more?: No   Transportation Needs: Low Risk  (10/10/2023)    Transportation Needs     Within the past 12 months, has lack of transportation kept you from medical appointments, getting your medicines, non-medical meetings or appointments, work, or from getting things that you need?: No   Physical Activity: Not on file   Stress: Not on file   Social Connections: Unknown (12/29/2021)    Received from [facility], [facility]    Social Connections     Frequency of Communication with Friends and Family: Not on file   Interpersonal Safety: Low Risk  (10/10/2023)    Interpersonal Safety     Do you feel physically and emotionally safe where you currently live?: Yes     Within the past 12 months, have you been hit, slapped, kicked or otherwise physically hurt by someone?: No     Within the past 12 months, have you been humiliated or emotionally abused in other ways by your partner or ex-partner?: No   Housing Stability: Low Risk  (10/10/2023)    Housing Stability     Do you have housing? : Yes     Are you worried about losing your housing?: No     No family history on  "file.     Visual Exam:  GEN: NAD,   Head: Normocephalic.  EYES: EOMI  ENT: Oropharynx is clear, Dyson class 4+ airway.  Psych: normal mood, normal affect  Snore test:(+)     Labs/Studies:     No results found for: \"PH\", \"PHARTERIAL\", \"PO2\", \"DM1LCXFQWQE\", \"SAT\", \"PCO2\", \"HCO3\", \"BASEEXCESS\", \"OPAL\", \"BEB\"  Lab Results   Component Value Date    TSH 3.38 03/07/2024    TSH 2.61 10/19/2018     Lab Results   Component Value Date     (H) 06/05/2024     (H) 03/20/2024     Lab Results   Component Value Date    HGB 12.7 06/05/2024    HGB 12.7 03/20/2024     Lab Results   Component Value Date    BUN 16.1 06/05/2024    BUN 22.7 (H) 03/20/2024    CR 0.93 06/05/2024    CR 0.95 03/20/2024     Lab Results   Component Value Date    AST 12 06/05/2024    AST 16 03/07/2024    ALT 12 06/05/2024    ALT 16 03/07/2024    ALKPHOS 101 06/05/2024    ALKPHOS 99 03/07/2024    BILITOTAL 0.2 06/05/2024    BILITOTAL 0.2 03/07/2024     No results found for: \"UAMP\", \"UBARB\", \"BENZODIAZEUR\", \"UCANN\", \"UCOC\", \"OPIT\", \"UPCP\"    Recent Labs   Lab Test 06/05/24  0000 03/20/24  0738    136   POTASSIUM 4.3 3.6   CHLORIDE 107 106   CO2 25 22   ANIONGAP 7 8   * 108*   BUN 16.1 22.7*   CR 0.93 0.95   IMELDA 8.7 8.7       Ferritin   Date Value Ref Range Status   03/07/2024 36 6 - 175 ng/mL Final       Denis Wilks DO  Board Certified in Internal Medicine and Sleep Medicine    (Note created with Dragon voice recognition and unintended spelling errors and word substitutions may occur)     "

## 2024-06-07 NOTE — PATIENT INSTRUCTIONS
Patient education: What is a sleep study?     What is a sleep study? -- A sleep study is a test that measures how well you sleep and checks for sleep problems. For some sleep studies, you stay overnight in a sleep lab at a hospital or sleep center.     What happens during a sleep study? -- Before you go to sleep, a technician attaches small, sticky patches called  electrodes  to your head, chest, and legs. He or she will also place a small tube beneath your nose and might wrap 1 or 2 belts around your chest.   Each of these items has wires that connect to monitors. The monitors record your movement, brain activity, breathing, and other body functions while you sleep.  If you have a history of trouble falling asleep, your doctor might prescribe a medicine to help you fall asleep in the lab. If you have never taken the medicine before, your doctor might ask you take it on a night before your sleep study to see how it affects you.   Why might my doctor order a sleep study? -- Your doctor will order a sleep study if he or she thinks you have sleep apnea or a different condition that makes you:   ?Have sudden jerking leg movements while you sleep, called  periodic limb movements.    ?Feel very sleepy during the day and fall asleep all of a sudden, called  narcolepsy.    ?Have trouble falling asleep or staying asleep over a long period of time, called  chronic insomnia.    ?Do odd things while you sleep, such as walking.  How should I prepare for a sleep study? -- On the day of your sleep study, you should:   ?Avoid alcohol   ?Avoid drinking coffee, tea, sodas, and other drinks that have caffeine in the afternoon and evening   ?Take all of your regular medicines     The cost of care estimate line is [phone]. They are able to give the patient an estimate of the charges and also an estimate of their insurance coverage/patient responsibility.   After your sleep study is performed, please call us at [phone] or  [phone]  to schedule for a follow up to review the results of the sleep study.    Please bring one tab of low dose melatonin 3 mg or less to the night of the study.    Melatonin intake is completely voluntary.    You may take own melatonin after arrival to sleep center. Do not drive or operate machinery after intake of melatonin.     Please make every effort you can to sleep on your back with one pillow behind your head.    Please also call your insurance company next week to see if your sleep study is approved and find out your co-pay.

## 2024-06-07 NOTE — NURSING NOTE
Is the patient currently in the state of [place]? YES    Visit mode:VIDEO    If the visit is dropped, the patient can be reconnected by: VIDEO VISIT: Send to e-mail at: [email or web address]    Will anyone else be joining the visit? NO  (If patient encounters technical issues they should call [phone] :[phone])    How would you like to obtain your AVS? MyChart    Are changes needed to the allergy or medication list? Pt stated no changes to allergies and Pt stated no med changes    Are refills needed on medications prescribed by this physician? NO  Has patient had flu shot for current/most recent flu season? If so, when? Yes: 10/10/2023    Reason for visit: Consult    Sugey CHAPIN

## 2024-06-10 ENCOUNTER — TELEPHONE (OUTPATIENT)
Dept: SLEEP MEDICINE | Facility: CLINIC | Age: 49
End: 2024-06-10

## 2024-06-10 NOTE — TELEPHONE ENCOUNTER
Reason for Call:  Other appointment    Detailed comments: Pt calling to schedule her sleep study would like to have it at [facility]. I have no appt available to schedule. Please call and advise pt.     Phone Number Patient can be reached at: Cell number on file:    Telephone Information:   Mobile [phone]       Best Time: anytime     Can we leave a detailed message on this number? YES    Call taken on 6/10/2024 at 1:12 PM by Josué Patel

## 2024-06-11 NOTE — TELEPHONE ENCOUNTER
Message sent to scheduling team- visits are available.     Kaylie Hdz CMA on 6/11/2024 at 11:28 AM

## 2024-06-18 ENCOUNTER — APPOINTMENT (OUTPATIENT)
Dept: GENERAL RADIOLOGY | Facility: CLINIC | Age: 49
End: 2024-06-18
Attending: STUDENT IN AN ORGANIZED HEALTH CARE EDUCATION/TRAINING PROGRAM
Payer: COMMERCIAL

## 2024-06-18 ENCOUNTER — HOSPITAL ENCOUNTER (EMERGENCY)
Facility: CLINIC | Age: 49
Discharge: HOME | End: 2024-06-18
Attending: STUDENT IN AN ORGANIZED HEALTH CARE EDUCATION/TRAINING PROGRAM | Admitting: STUDENT IN AN ORGANIZED HEALTH CARE EDUCATION/TRAINING PROGRAM
Payer: COMMERCIAL

## 2024-06-18 VITALS
OXYGEN SATURATION: 92 % | RESPIRATION RATE: 10 BRPM | HEART RATE: 68 BPM | BODY MASS INDEX: 57.42 KG/M2 | SYSTOLIC BLOOD PRESSURE: 115 MMHG | DIASTOLIC BLOOD PRESSURE: 53 MMHG | TEMPERATURE: 98.1 F | HEIGHT: 60 IN

## 2024-06-18 DIAGNOSIS — R07.89 LEFT-SIDED CHEST WALL PAIN: ICD-10-CM

## 2024-06-18 DIAGNOSIS — M79.622 PAIN OF LEFT UPPER ARM: ICD-10-CM

## 2024-06-18 LAB
ALBUMIN SERPL BCG-MCNC: 3.6 G/DL (ref 3.5–5.2)
ALP SERPL-CCNC: 112 U/L (ref 40–150)
ALT SERPL W P-5'-P-CCNC: 12 U/L (ref 0–50)
ANION GAP SERPL CALCULATED.3IONS-SCNC: 11 MMOL/L (ref 7–15)
AST SERPL W P-5'-P-CCNC: 13 U/L (ref 0–45)
BASOPHILS # BLD AUTO: 0.1 10E3/UL (ref 0–0.2)
BASOPHILS NFR BLD AUTO: 1 %
BILIRUB DIRECT SERPL-MCNC: <0.2 MG/DL (ref 0–0.3)
BILIRUB SERPL-MCNC: 0.3 MG/DL
BUN SERPL-MCNC: 12.6 MG/DL (ref 6–20)
CALCIUM SERPL-MCNC: 8.6 MG/DL (ref 8.6–10)
CHLORIDE SERPL-SCNC: 103 MMOL/L (ref 98–107)
CREAT SERPL-MCNC: 0.95 MG/DL (ref 0.51–0.95)
DEPRECATED HCO3 PLAS-SCNC: 22 MMOL/L (ref 22–29)
EGFRCR SERPLBLD CKD-EPI 2021: 73 ML/MIN/1.73M2
EOSINOPHIL # BLD AUTO: 0.5 10E3/UL (ref 0–0.7)
EOSINOPHIL NFR BLD AUTO: 6 %
ERYTHROCYTE [DISTWIDTH] IN BLOOD BY AUTOMATED COUNT: 14.2 % (ref 10–15)
GLUCOSE SERPL-MCNC: 120 MG/DL (ref 70–99)
HCT VFR BLD AUTO: 39.8 % (ref 35–47)
HGB BLD-MCNC: 12.7 G/DL (ref 11.7–15.7)
HOLD SPECIMEN: NORMAL
IMM GRANULOCYTES # BLD: 0 10E3/UL
IMM GRANULOCYTES NFR BLD: 0 %
LIPASE SERPL-CCNC: 17 U/L (ref 13–60)
LYMPHOCYTES # BLD AUTO: 2.4 10E3/UL (ref 0.8–5.3)
LYMPHOCYTES NFR BLD AUTO: 27 %
MCH RBC QN AUTO: 28.3 PG (ref 26.5–33)
MCHC RBC AUTO-ENTMCNC: 31.9 G/DL (ref 31.5–36.5)
MCV RBC AUTO: 89 FL (ref 78–100)
MONOCYTES # BLD AUTO: 0.6 10E3/UL (ref 0–1.3)
MONOCYTES NFR BLD AUTO: 7 %
NEUTROPHILS # BLD AUTO: 5.5 10E3/UL (ref 1.6–8.3)
NEUTROPHILS NFR BLD AUTO: 60 %
NRBC # BLD AUTO: 0 10E3/UL
NRBC BLD AUTO-RTO: 0 /100
PLATELET # BLD AUTO: 305 10E3/UL (ref 150–450)
POTASSIUM SERPL-SCNC: 4.1 MMOL/L (ref 3.4–5.3)
PROT SERPL-MCNC: 6.9 G/DL (ref 6.4–8.3)
RBC # BLD AUTO: 4.48 10E6/UL (ref 3.8–5.2)
SODIUM SERPL-SCNC: 136 MMOL/L (ref 135–145)
TROPONIN T SERPL HS-MCNC: 9 NG/L
WBC # BLD AUTO: 9.1 10E3/UL (ref 4–11)

## 2024-06-18 PROCEDURE — 96374 THER/PROPH/DIAG INJ IV PUSH: CPT | Performed by: STUDENT IN AN ORGANIZED HEALTH CARE EDUCATION/TRAINING PROGRAM

## 2024-06-18 PROCEDURE — 93005 ELECTROCARDIOGRAM TRACING: CPT | Performed by: STUDENT IN AN ORGANIZED HEALTH CARE EDUCATION/TRAINING PROGRAM

## 2024-06-18 PROCEDURE — 250N000013 HC RX MED GY IP 250 OP 250 PS 637: Performed by: STUDENT IN AN ORGANIZED HEALTH CARE EDUCATION/TRAINING PROGRAM

## 2024-06-18 PROCEDURE — 80048 BASIC METABOLIC PNL TOTAL CA: CPT | Performed by: STUDENT IN AN ORGANIZED HEALTH CARE EDUCATION/TRAINING PROGRAM

## 2024-06-18 PROCEDURE — 99285 EMERGENCY DEPT VISIT HI MDM: CPT | Mod: 25 | Performed by: STUDENT IN AN ORGANIZED HEALTH CARE EDUCATION/TRAINING PROGRAM

## 2024-06-18 PROCEDURE — 71046 X-RAY EXAM CHEST 2 VIEWS: CPT

## 2024-06-18 PROCEDURE — 250N000011 HC RX IP 250 OP 636: Mod: JZ | Performed by: STUDENT IN AN ORGANIZED HEALTH CARE EDUCATION/TRAINING PROGRAM

## 2024-06-18 PROCEDURE — 82247 BILIRUBIN TOTAL: CPT | Performed by: STUDENT IN AN ORGANIZED HEALTH CARE EDUCATION/TRAINING PROGRAM

## 2024-06-18 PROCEDURE — 99284 EMERGENCY DEPT VISIT MOD MDM: CPT | Performed by: STUDENT IN AN ORGANIZED HEALTH CARE EDUCATION/TRAINING PROGRAM

## 2024-06-18 PROCEDURE — 84484 ASSAY OF TROPONIN QUANT: CPT | Performed by: STUDENT IN AN ORGANIZED HEALTH CARE EDUCATION/TRAINING PROGRAM

## 2024-06-18 PROCEDURE — 36415 COLL VENOUS BLD VENIPUNCTURE: CPT | Performed by: STUDENT IN AN ORGANIZED HEALTH CARE EDUCATION/TRAINING PROGRAM

## 2024-06-18 PROCEDURE — 83690 ASSAY OF LIPASE: CPT | Performed by: STUDENT IN AN ORGANIZED HEALTH CARE EDUCATION/TRAINING PROGRAM

## 2024-06-18 PROCEDURE — 93010 ELECTROCARDIOGRAM REPORT: CPT | Performed by: STUDENT IN AN ORGANIZED HEALTH CARE EDUCATION/TRAINING PROGRAM

## 2024-06-18 PROCEDURE — 85004 AUTOMATED DIFF WBC COUNT: CPT | Performed by: STUDENT IN AN ORGANIZED HEALTH CARE EDUCATION/TRAINING PROGRAM

## 2024-06-18 RX ORDER — ASPIRIN 81 MG/1
324 TABLET, CHEWABLE ORAL ONCE
Status: COMPLETED | OUTPATIENT
Start: 2024-06-18 | End: 2024-06-18

## 2024-06-18 RX ORDER — HYDROMORPHONE HYDROCHLORIDE 1 MG/ML
0.5 INJECTION, SOLUTION INTRAMUSCULAR; INTRAVENOUS; SUBCUTANEOUS EVERY 30 MIN PRN
Status: DISCONTINUED | OUTPATIENT
Start: 2024-06-18 | End: 2024-06-18 | Stop reason: HOSPADM

## 2024-06-18 RX ORDER — OXYCODONE HYDROCHLORIDE 5 MG/1
5 TABLET ORAL EVERY 6 HOURS PRN
Qty: 12 TABLET | Refills: 0 | Status: SHIPPED | OUTPATIENT
Start: 2024-06-18 | End: 2024-06-21

## 2024-06-18 RX ORDER — METHYLPREDNISOLONE 4 MG
TABLET, DOSE PACK ORAL
Qty: 21 TABLET | Refills: 0 | Status: SHIPPED | OUTPATIENT
Start: 2024-06-18 | End: 2024-07-09

## 2024-06-18 RX ADMIN — ASPIRIN 81 MG 324 MG: 81 TABLET ORAL at 10:29

## 2024-06-18 RX ADMIN — HYDROMORPHONE HYDROCHLORIDE 0.5 MG: 1 INJECTION, SOLUTION INTRAMUSCULAR; INTRAVENOUS; SUBCUTANEOUS at 10:29

## 2024-06-18 ASSESSMENT — ACTIVITIES OF DAILY LIVING (ADL): ADLS_ACUITY_SCORE: 40

## 2024-06-18 ASSESSMENT — COLUMBIA-SUICIDE SEVERITY RATING SCALE - C-SSRS
2. HAVE YOU ACTUALLY HAD ANY THOUGHTS OF KILLING YOURSELF IN THE PAST MONTH?: NO
1. IN THE PAST MONTH, HAVE YOU WISHED YOU WERE DEAD OR WISHED YOU COULD GO TO SLEEP AND NOT WAKE UP?: NO
6. HAVE YOU EVER DONE ANYTHING, STARTED TO DO ANYTHING, OR PREPARED TO DO ANYTHING TO END YOUR LIFE?: YES

## 2024-06-18 NOTE — DISCHARGE INSTRUCTIONS
Your testing today was reassuring.  When coupled with the workup done a few weeks ago, I do not think your pain is coming from the heart or lungs.    It seems more likely that you are having inflammation from a musculoskeletal problem or potentially even some nerve irritation.  For this reason, I would recommend taking Tylenol.  We will also prescribe a low-dose steroid this week.  Take as instructed.  Short course of oxycodone was provided as well for breakthrough pain.    Follow-up with your spine specialist as scheduled on Monday.  They may want to order new imaging or have other suggestions.  Turn to the emergency department sooner for any new or acutely worsening symptoms.

## 2024-06-18 NOTE — ED PROVIDER NOTES
History     Chief Complaint   Patient presents with    Chest Pain    Arm Pain     HPI  Brittney Moon is a 48 year old female with history of anxiety, complex regional pain syndrome and left leg amputation, opioid dependence who presents for evaluation of left-sided chest pain and left arm numbness.  Patient has had similar numbness for the past few weeks.  It started without obvious injury or strain.  She was seen by me in the emergency department on 6/4 and underwent extensive testing.  Troponin was negative, EKG showed no dysrhythmia or ischemic changes, chest CT was negative for PE or other acute pathology, and CT of the cervical spine showed mild facet arthropathy with no other significant findings.  Patient already had prescriptions for Norco and Robaxin at that time.  She has been taking both of these medications without significant relief of her pain.  The discomfort worsened this morning, again without new injury or strain, so she presents for reevaluation.  Pain is worsened with movements of the left arm and torso.  She now feels numbness diffusely throughout the left arm which is new.  Patient otherwise denies any headache, neck pain, focal weakness, other complaints today.    Allergies:  Allergies   Allergen Reactions    Food Anaphylaxis     cilantro    Codeine Other (See Comments)    Hydromorphone Nausea and Vomiting    Coriandrum Sativum Rash       Problem List:    Patient Active Problem List    Diagnosis Date Noted    Complex regional pain syndrome i of left lower limb 07/29/2022     Priority: Medium    Opioid dependence (H) 07/29/2022     Priority: Medium    Chronic low back pain 08/23/2021     Priority: Medium    Inability to bear weight 02/11/2021     Priority: Medium    Fall at home, initial encounter 01/06/2020     Priority: Medium    Left hip pain 01/06/2020     Priority: Medium    Ganglion cyst 11/19/2018     Priority: Medium    Type 2 diabetes mellitus without complication, without long-term  current use of insulin (H) 10/21/2018     Priority: Medium    Morbid obesity (H) 10/21/2018     Priority: Medium    Amputation of leg (H) 11/17/2017     Priority: Medium     Formatting of this note might be different from the original.  Overview:   15 surgeries; following club foot repair and osteomyelitis as childe      History of right knee joint replacement 11/17/2017     Priority: Medium    Anxiety disorder due to medical condition 08/03/2010     Priority: Medium        Past Medical History:    Past Medical History:   Diagnosis Date    Back pain     Migraine     Nephrolithiasis     Osteoarthritis     Other convulsions     Unspecified osteomyelitis, lower leg        Past Surgical History:    Past Surgical History:   Procedure Laterality Date    CHOLECYSTECTOMY, LAPOROSCOPIC  1995    COLONOSCOPY N/A 10/18/2022    Procedure: COLONOSCOPY, WITH POLYPECTOMY;  Surgeon: Micah Reno MD;  Location: [facility]    COMBINED CYSTOSCOPY, LASER HOLMIUM LITHOTRIPSY URETER(S)      EXCISE GANGLION WRIST Left 12/24/2018    Procedure: EXCISION LEFT WRIST GANGLION CYST;  Surgeon: Kehinde Pino DO;  Location: [facility]    INGUINAL HERNIA REPAIR Right     INJECT FACET JOINT Bilateral 11/29/2019    Procedure: lumbar sacral facet injection bilateral 4-5 sacral 1;  Surgeon: Canelo Zamora MD;  Location: [facility]    TOTAL KNEE ARTHROPLASTY Right 2012    WISDOM TOOTH EXTRACTION      ZZC AMPUTATION LOW LEG THRU TIB/FIB      Below knee amputation secondary to osteomyelitis       Family History:    Family History   Problem Relation Age of Onset    Snoring Mother     Sleep Apnea Mother        Social History:  Marital Status:  Single [1]  Social History     Tobacco Use    Smoking status: Never    Smokeless tobacco: Never   Vaping Use    Vaping status: Never Used   Substance Use Topics    Alcohol use: Yes     Comment: rare    Drug use: No        Medications:    methylPREDNISolone (MEDROL DOSEPAK) 4 MG tablet therapy pack  oxyCODONE  (ROXICODONE) 5 MG tablet  acetaminophen (TYLENOL) 500 MG tablet  cyanocobalamin (VITAMIN B-12) 1000 MCG tablet  FLUoxetine (PROZAC) 40 MG capsule  HYDROcodone-acetaminophen (NORCO) 5-325 MG tablet  metFORMIN (GLUCOPHAGE XR) 500 MG 24 hr tablet  methocarbamol (ROBAXIN) 500 MG tablet  simvastatin (ZOCOR) 5 MG tablet  SUMAtriptan (IMITREX) 25 MG tablet  topiramate (TOPAMAX) 100 MG tablet  topiramate (TOPAMAX) 50 MG tablet  Vitamin D3 (CHOLECALCIFEROL) 25 mcg (1000 units) tablet      Review of Systems   All other systems reviewed and are negative.  See HPI.    Physical Exam   BP: 134/89  Pulse: 70  Temp: 98.1  F (36.7  C)  Resp: 18  Height: 152.4 cm (5')  SpO2: 99 %    Physical Exam  Vitals and nursing note reviewed.   Constitutional:       Appearance: Normal appearance. She is well-developed. She is not diaphoretic.      Comments: Anxious/uncomfortable.  Otherwise no acute distress.   HENT:      Head: Normocephalic and atraumatic.      Mouth/Throat:      Mouth: Mucous membranes are moist.   Eyes:      General: No scleral icterus.     Extraocular Movements: Extraocular movements intact.      Conjunctiva/sclera: Conjunctivae normal.      Pupils: Pupils are equal, round, and reactive to light.   Cardiovascular:      Rate and Rhythm: Normal rate and regular rhythm.      Pulses:           Radial pulses are 2+ on the right side and 2+ on the left side.      Heart sounds: Normal heart sounds. No murmur heard.  Pulmonary:      Effort: Pulmonary effort is normal. No respiratory distress.      Breath sounds: Normal breath sounds.      Comments: Speaking comfortably in full sentences.  Lungs clear to auscultation.  No distress.  Chest:      Chest wall: Tenderness present.      Comments: Patient has reproducible tenderness to the left pectoralis and anterior chest wall with no overlying skin changes or focal bony abnormalities.  Abdominal:      General: Abdomen is flat.      Tenderness: There is no abdominal tenderness. There is  no rebound.   Musculoskeletal:         General: Normal range of motion.      Cervical back: Normal range of motion and neck supple.      Comments: Tenderness to the left anterior chest wall as described above.  No focal tenderness to the cervical midline.  Spurling sign is negative.  Range of motion to the left arm is normal with equal strength compared to the right.  No focal bony tenderness to the clavicle or shoulder.   Skin:     General: Skin is warm.      Capillary Refill: Capillary refill takes less than 2 seconds.      Findings: No rash.   Neurological:      General: No focal deficit present.      Mental Status: She is alert and oriented to person, place, and time.      Cranial Nerves: No cranial nerve deficit.      Motor: No weakness.      Comments: Strength and sensation intact throughout, including to the left arm/hand.   strength is normal.   Psychiatric:         Mood and Affect: Mood is anxious.         ED Course        Procedures         EKG performed at 10:15 AM.  Sinus rhythm, rate 67.  Normal axis.  Normal intervals.  Nonspecific T wave changes.  Exam independently interpreted by me.         Results for orders placed or performed during the hospital encounter of 06/18/24 (from the past 24 hour(s))   CBC with Platelets & Differential    Narrative    The following orders were created for panel order CBC with Platelets & Differential.  Procedure                               Abnormality         Status                     ---------                               -----------         ------                     CBC with platelets and d...[[id number]]                      Final result                 Please view results for these tests on the individual orders.   Basic metabolic panel   Result Value Ref Range    Sodium 136 135 - 145 mmol/L    Potassium 4.1 3.4 - 5.3 mmol/L    Chloride 103 98 - 107 mmol/L    Carbon Dioxide (CO2) 22 22 - 29 mmol/L    Anion Gap 11 7 - 15 mmol/L    Urea Nitrogen 12.6 6.0 -  20.0 mg/dL    Creatinine 0.95 0.51 - 0.95 mg/dL    GFR Estimate 73 >60 mL/min/1.73m2    Calcium 8.6 8.6 - 10.0 mg/dL    Glucose 120 (H) 70 - 99 mg/dL   Troponin T, High Sensitivity   Result Value Ref Range    Troponin T, High Sensitivity 9 <=14 ng/L   [facility] Draw    Narrative    The following orders were created for panel order [facility].  Procedure                               Abnormality         Status                     ---------                               -----------         ------                     Extra Blue Top Tube[[id number]]                              Final result                 Please view results for these tests on the individual orders.   Lipase   Result Value Ref Range    Lipase 17 13 - 60 U/L   Hepatic function panel   Result Value Ref Range    Protein Total 6.9 6.4 - 8.3 g/dL    Albumin 3.6 3.5 - 5.2 g/dL    Bilirubin Total 0.3 <=1.2 mg/dL    Alkaline Phosphatase 112 40 - 150 U/L    AST 13 0 - 45 U/L    ALT 12 0 - 50 U/L    Bilirubin Direct <0.20 0.00 - 0.30 mg/dL   CBC with platelets and differential   Result Value Ref Range    WBC Count 9.1 4.0 - 11.0 10e3/uL    RBC Count 4.48 3.80 - 5.20 10e6/uL    Hemoglobin 12.7 11.7 - 15.7 g/dL    Hematocrit 39.8 35.0 - 47.0 %    MCV 89 78 - 100 fL    MCH 28.3 26.5 - 33.0 pg    MCHC 31.9 31.5 - 36.5 g/dL    RDW 14.2 10.0 - 15.0 %    Platelet Count 305 150 - 450 10e3/uL    % Neutrophils 60 %    % Lymphocytes 27 %    % Monocytes 7 %    % Eosinophils 6 %    % Basophils 1 %    % Immature Granulocytes 0 %    NRBCs per 100 WBC 0 <1 /100    Absolute Neutrophils 5.5 1.6 - 8.3 10e3/uL    Absolute Lymphocytes 2.4 0.8 - 5.3 10e3/uL    Absolute Monocytes 0.6 0.0 - 1.3 10e3/uL    Absolute Eosinophils 0.5 0.0 - 0.7 10e3/uL    Absolute Basophils 0.1 0.0 - 0.2 10e3/uL    Absolute Immature Granulocytes 0.0 <=0.4 10e3/uL    Absolute NRBCs 0.0 10e3/uL   Extra Blue Top Tube   Result Value Ref Range    Hold Specimen JIC    XR Chest 2 Views    Narrative    CHEST TWO  VIEWS June 18, 2024 10:44 AM     HISTORY: Left upper chest wall/arm pain with numbness.    COMPARISON: None.      Impression    IMPRESSION: Hypoinflated lungs. No acute cardiopulmonary disease.  Intrathecal stimulator leads at the lower thoracic level.       Medications   aspirin (ASA) chewable tablet 324 mg (324 mg Oral $Given 6/18/24 1029)       Assessments & Plan (with Medical Decision Making)     I have reviewed the nursing notes.    I have reviewed the findings, diagnosis, plan and need for follow up with the patient.    Medical Decision Making  Brittney Moon is a 48 year old female with history of anxiety, complex regional pain syndrome and left leg amputation, opioid dependence who presents for evaluation of left-sided chest pain and left arm numbness.  Normal vitals.  Exam is very reassuring overall.  She has some reproducible tenderness to the left upper anterior chest wall with no overlying skin changes, bony abnormalities or crepitus.  Range of motion and strength is entirely normal to the left arm.  Spurling sign is negative.  The cause of her symptoms, particularly with a duration of several weeks, is unclear.  She had an extensive workup in the emergency department recently, see that note for full details.  Pain improved with a dose of Dilaudid.  EKG and labs were again very reassuring.  Troponin was negative.  I have very low suspicion for acute cardiac cause.  We also pursued a CT scan of the chest and cervical spine during her last visit.  She had no evidence of PE or aortic pathology, suspicion for those today is still low and I do not think she warrants repeat advanced imaging.  No other focal neurological symptoms to raise suspicion for intracranial cause.  The source of her pain seems most likely musculoskeletal in nature since it is reproducible.  There is also a possibility she could be experiencing some localized nerve pain/radiculopathy.  I think it would be reasonable to trial a Medrol  Dosepak.  Patient has an existing appointment with her spine/pain specialist within the next 2 weeks.  Advised her to return to the emergency department sooner for any new or worsening symptoms.    Discharge Medication List as of 6/18/2024 11:34 AM        START taking these medications    Details   methylPREDNISolone (MEDROL DOSEPAK) 4 MG tablet therapy pack Follow Package Directions, Disp-21 tablet, R-0, E-Prescribe      oxyCODONE (ROXICODONE) 5 MG tablet Take 1 tablet (5 mg) by mouth every 6 hours as needed for pain, Disp-12 tablet, R-0, E-Prescribe           Final diagnoses:   Left-sided chest wall pain   Pain of left upper arm     6/18/2024   [facility] EMERGENCY DEPT       Hesham Ramírez MD  06/18/24 2669

## 2024-06-18 NOTE — ED TRIAGE NOTES
Pt reports that she has been experiencing left arm pain that radiates to her left chest for the past week that has worsened today.      Triage Assessment (Adult)       Row Name 06/18/24 1002          Triage Assessment    Airway WDL WDL        Respiratory WDL    Respiratory WDL WDL        Skin Circulation/Temperature WDL    Skin Circulation/Temperature WDL WDL        Cardiac WDL    Cardiac WDL X;chest pain        Peripheral/Neurovascular WDL    Peripheral Neurovascular WDL WDL        Cognitive/Neuro/Behavioral WDL    Cognitive/Neuro/Behavioral WDL WDL

## 2024-06-18 NOTE — Clinical Note
Brittney Moon was seen and treated in our emergency department on 6/18/2024.  She may return to work on 06/20/2024.       If you have any questions or concerns, please don't hesitate to call.      Hesham Ramírez MD

## 2024-06-21 ENCOUNTER — MYC REFILL (OUTPATIENT)
Dept: FAMILY MEDICINE | Facility: OTHER | Age: 49
End: 2024-06-21
Payer: COMMERCIAL

## 2024-06-21 DIAGNOSIS — G43.909 MIGRAINE WITHOUT STATUS MIGRAINOSUS, NOT INTRACTABLE, UNSPECIFIED MIGRAINE TYPE: Primary | ICD-10-CM

## 2024-06-21 RX ORDER — TOPIRAMATE 50 MG/1
50 TABLET, FILM COATED ORAL 2 TIMES DAILY
Qty: 60 TABLET | Refills: 0 | Status: SHIPPED | OUTPATIENT
Start: 2024-06-21 | End: 2024-07-15

## 2024-06-21 RX ORDER — TOPIRAMATE 100 MG/1
100 TABLET, FILM COATED ORAL 2 TIMES DAILY
Qty: 60 TABLET | Refills: 0 | Status: SHIPPED | OUTPATIENT
Start: 2024-06-21 | End: 2024-07-15

## 2024-06-22 DIAGNOSIS — G43.909 MIGRAINE WITHOUT STATUS MIGRAINOSUS, NOT INTRACTABLE, UNSPECIFIED MIGRAINE TYPE: ICD-10-CM

## 2024-06-22 DIAGNOSIS — E11.9 TYPE 2 DIABETES MELLITUS WITHOUT COMPLICATION, WITHOUT LONG-TERM CURRENT USE OF INSULIN (H): ICD-10-CM

## 2024-06-23 ENCOUNTER — MYC MEDICAL ADVICE (OUTPATIENT)
Dept: FAMILY MEDICINE | Facility: OTHER | Age: 49
End: 2024-06-23
Payer: COMMERCIAL

## 2024-06-24 RX ORDER — METFORMIN HCL 500 MG
500 TABLET, EXTENDED RELEASE 24 HR ORAL 2 TIMES DAILY WITH MEALS
Qty: 60 TABLET | Refills: 0 | Status: SHIPPED | OUTPATIENT
Start: 2024-06-24 | End: 2024-07-15

## 2024-06-24 RX ORDER — SUMATRIPTAN 25 MG/1
25 TABLET, FILM COATED ORAL
Qty: 18 TABLET | Refills: 0 | Status: SHIPPED | OUTPATIENT
Start: 2024-06-24 | End: 2024-07-15

## 2024-06-24 NOTE — TELEPHONE ENCOUNTER
Patient is overdue for diabetic checkup. Last seen in Oct 2023, would have been due for diabetic checkup in April 2024. Would need the follow up prior to being able to complete any forms. Please help patient schedule this visit. She can bring form with her to that appointment.    Thanks,  Pelon Cotter PA-C on 6/24/2024 at 9:56 AM

## 2024-07-05 DIAGNOSIS — M62.830 SPASM OF BACK MUSCLES: ICD-10-CM

## 2024-07-05 RX ORDER — METHOCARBAMOL 500 MG/1
TABLET, FILM COATED ORAL
Qty: 120 TABLET | Refills: 0 | Status: SHIPPED | OUTPATIENT
Start: 2024-07-05 | End: 2024-08-05

## 2024-07-09 ENCOUNTER — OFFICE VISIT (OUTPATIENT)
Dept: FAMILY MEDICINE | Facility: OTHER | Age: 49
End: 2024-07-09
Payer: COMMERCIAL

## 2024-07-09 VITALS
TEMPERATURE: 97.9 F | OXYGEN SATURATION: 96 % | SYSTOLIC BLOOD PRESSURE: 118 MMHG | WEIGHT: 293 LBS | HEIGHT: 60 IN | RESPIRATION RATE: 14 BRPM | HEART RATE: 74 BPM | DIASTOLIC BLOOD PRESSURE: 80 MMHG | BODY MASS INDEX: 57.52 KG/M2

## 2024-07-09 DIAGNOSIS — M75.42 IMPINGEMENT SYNDROME, SHOULDER, LEFT: ICD-10-CM

## 2024-07-09 DIAGNOSIS — Z23 HIGH PRIORITY FOR 2019-NCOV VACCINE: ICD-10-CM

## 2024-07-09 DIAGNOSIS — E11.9 TYPE 2 DIABETES MELLITUS WITHOUT COMPLICATION, WITHOUT LONG-TERM CURRENT USE OF INSULIN (H): Primary | ICD-10-CM

## 2024-07-09 DIAGNOSIS — S46.812D STRAIN OF LEFT TRAPEZIUS MUSCLE, SUBSEQUENT ENCOUNTER: ICD-10-CM

## 2024-07-09 LAB — HBA1C MFR BLD: 6.8 % (ref 0–5.6)

## 2024-07-09 PROCEDURE — 36415 COLL VENOUS BLD VENIPUNCTURE: CPT | Performed by: PHYSICIAN ASSISTANT

## 2024-07-09 PROCEDURE — 83036 HEMOGLOBIN GLYCOSYLATED A1C: CPT | Performed by: PHYSICIAN ASSISTANT

## 2024-07-09 PROCEDURE — 90480 ADMN SARSCOV2 VAC 1/ONLY CMP: CPT | Performed by: PHYSICIAN ASSISTANT

## 2024-07-09 PROCEDURE — 99214 OFFICE O/P EST MOD 30 MIN: CPT | Performed by: PHYSICIAN ASSISTANT

## 2024-07-09 PROCEDURE — 91320 SARSCV2 VAC 30MCG TRS-SUC IM: CPT | Performed by: PHYSICIAN ASSISTANT

## 2024-07-09 PROCEDURE — 99207 PR FOOT EXAM NO CHARGE: CPT | Performed by: PHYSICIAN ASSISTANT

## 2024-07-09 RX ORDER — NAPROXEN 500 MG/1
500 TABLET ORAL 2 TIMES DAILY WITH MEALS
Qty: 60 TABLET | Refills: 1 | Status: SHIPPED | OUTPATIENT
Start: 2024-07-09

## 2024-07-09 NOTE — PROGRESS NOTES
Assessment & Plan     Type 2 diabetes mellitus without complication, without long-term current use of insulin (H)  A1c returned at 6.8% today which is increased from previous check in March 2024. I suspect that this increase is partially due to use of medrol dosepak this past month. I will have patient work on healthy diet and can repeat check this winter, ~3 months.   - Hemoglobin A1c; Future  - FOOT EXAM  - Hemoglobin A1c    Impingement syndrome, shoulder, left  Strain of left trapezius muscle, subsequent encounter  Patient had three emergency department visits this past month for left sided chest pains. Review of these visits shows that she had negative CT of chest/ab, cervical spine and chest pulm. Labs were grossly unremarkable with exception of mildly elevated ddimer (0.55). Patient's pains were noted to be reproducible on exam and suspected to be musculoskeletal in nature. Today patient reports that the pains are localized to the left shoulder and worse with flexion, abduction and internal rotation. The area is painful to lay on at night. Use of heating pad helps reduce symptoms. On exam pain limited AROM, left trapezius and AC joint quite tender to palpation. Discussed with patient suspicion for impingement vs AC bursitis and likely concomitant trapezius spasm. Her pain specialist provided toradol injection which provided temporary relief. I will start patient on naproxen twice daily, have her begin PT services and meet with our orthopedic providers to discuss whether an injection in the joint would be warranted. She was receptive to this plan. Patient can continue use of ice/heat and robaxin as needed. Reference material attached to the AVS.   - Orthopedic  Referral; Future  - naproxen (NAPROSYN) 500 MG tablet; Take 1 tablet (500 mg) by mouth 2 times daily (with meals)  - Physical Therapy  Referral; Future    High priority for 2019-nCoV vaccine  Given without complication. Information  sent home with patient.       MED REC REQUIRED  Post Medication Reconciliation Status:  Discharge medications reconciled, continue medications without change  BMI  Estimated body mass index is 57.42 kg/m  as calculated from the following:    Height as of this encounter: 1.524 m (5').    Weight as of this encounter: 133.4 kg (294 lb).   Weight management plan: Discussed healthy diet and exercise guidelines      Pritesh Gatica is a 48 year old, presenting for the following health issues:  Hospital F/U      7/9/2024    10:15 AM   Additional Questions   Roomed by Anat BUENROSTRO   Accompanied by self         7/9/2024   Forms   Any forms needing to be completed Yes        HPI     ED/UC Followup:    [email or web address]  Date of visit: 06/18/2024 and 06/19/2024  Reason for visit: Chest pain/Arm pain   Current Status: arm is still killing her, she has difficulty raising her arm and it hurts constantly. The chest pain is better though    ED COURSE: 06/19/24 [facility]    Saline lock  Fentanyl 50 mcg iv twice  Toradol 30 mg iv  Zofran 8 mg iv  The patient was asleep and when she woke up her pain was still a 5 of 10.    IMPRESSION and PLAN:  Chest pain    This may be chest wall pain as it hurts when she twists to the left and when the chest wall is palpated and when she takes a breath.  She has pain medication at home. She should contact her provider for follow up and consider a stress test.     Medical Decision Making - 06/18 [facility]  Brittney Moon is a 48 year old female with history of anxiety, complex regional pain syndrome and left leg amputation, opioid dependence who presents for evaluation of left-sided chest pain and left arm numbness.  Normal vitals.  Exam is very reassuring overall.  She has some reproducible tenderness to the left upper anterior chest wall with no overlying skin changes, bony abnormalities or crepitus.  Range of motion and strength is entirely normal to the left arm.  Spurling  sign is negative.  The cause of her symptoms, particularly with a duration of several weeks, is unclear.  She had an extensive workup in the emergency department recently, see that note for full details.  Pain improved with a dose of Dilaudid.  EKG and labs were again very reassuring.  Troponin was negative.  I have very low suspicion for acute cardiac cause.  We also pursued a CT scan of the chest and cervical spine during her last visit.  She had no evidence of PE or aortic pathology, suspicion for those today is still low and I do not think she warrants repeat advanced imaging.  No other focal neurological symptoms to raise suspicion for intracranial cause.  The source of her pain seems most likely musculoskeletal in nature since it is reproducible.  There is also a possibility she could be experiencing some localized nerve pain/radiculopathy.  I think it would be reasonable to trial a Medrol Dosepak.  Patient has an existing appointment with her spine/pain specialist within the next 2 weeks.  Advised her to return to the emergency department sooner for any new or worsening symptoms.    Medical Decision Making - 06/04 [facility]   Brittney Moon is a 48 year old female with history of anxiety, complex regional pain syndrome and left leg amputation, opioid dependence who presents for evaluation of chest pain.  Normal vitals on arrival.  Exam is very reassuring.  Lungs are clear to auscultation, pulses are equal throughout.  No calf tenderness.  Patient does have some tenderness to the distal dorsal right foot from an injury yesterday, but no deformity, and distal neurovascular exam is intact.  The cause of her chest pain is unclear, but differential would include anxiety, musculoskeletal strain, ACS, PE, pneumothorax, dysrhythmia, among others.  Aspirin given on arrival and she was also given a single dose of Dilaudid due to reports of severe pain.     Labs were very reassuring.  She has no leukocytosis or anemia,  electrolytes and transaminases/lipase were largely within normal limits.  EKG was similar in appearance to studies in the past and showed no new signs of ischemia.  Troponin was also negative.  Given pain for most of the night, I would have expected this to be elevated with acute cardiac causes.  Her D-dimer did return slightly elevated, but I suspect this is probably due to her recent foot contusion.  Her mother had some issues with clots, but she denies any personal history of PE/DVT.  She has not had any recent surgeries/immobilization and she denies any cancer history or hormonal medication use.  Overall low suspicion for PE, but after discussing options with the patient, she did elect to have a CT of the chest performed.  We also added on a CT of the cervical spine to evaluate for the possibility of radicular pain into the arm.     The studies were negative.  Cervical alignment was normal and CT of the chest showed no evidence of PE or other acute abnormalities.  Separately, repeat x-ray of the foot showed no evidence of fracture.  I think her pain is probably musculoskeletal in nature and I advised conservative therapies, existing prescriptions for Norco and Robaxin.  Patient will follow-up with her primary care doctor and agrees to return sooner for any new or worsening symptoms.      Review of Systems  Constitutional, HEENT, cardiovascular, pulmonary, gi and gu systems are negative, except as otherwise noted.      Objective    /80   Pulse 74   Temp 97.9  F (36.6  C) (Temporal)   Resp 14   Ht 1.524 m (5')   Wt 133.4 kg (294 lb)   LMP  (LMP Unknown)   SpO2 96%   BMI 57.42 kg/m    Body mass index is 57.42 kg/m .  Physical Exam   GENERAL: alert and no distress  NECK: no adenopathy, no asymmetry, masses, or scars  RESP: lungs clear to auscultation - no rales, rhonchi or wheezes  CV: regular rate and rhythm, normal S1 S2, no S3 or S4, no murmur, click or rub, no peripheral edema  MS: Pain limits  active abduction and flexion >90  as well as internal rotation of the left shoulder. Moderate tenderness to palpation along the left trapezius and AC joint. Negative crossover, unable to tolerate empty can.   NEURO: Normal strength and tone, mentation intact and speech normal  Diabetic foot exam: normal DP and PT pulses, no trophic changes or ulcerative lesions, and normal sensory exam    Results for orders placed or performed in visit on 07/09/24   Hemoglobin A1c     Status: Abnormal   Result Value Ref Range    Hemoglobin A1C 6.8 (H) 0.0 - 5.6 %           Signed Electronically by: Pelon Cotter PA-C    Answers submitted by the patient for this visit:  Patient Health Questionnaire (Submitted on 7/9/2024)  If you checked off any problems, how difficult have these problems made it for you to do your work, take care of things at home, or get along with other people?: Not difficult at all  PHQ9 TOTAL SCORE: 0

## 2024-07-14 DIAGNOSIS — E11.9 TYPE 2 DIABETES MELLITUS WITHOUT COMPLICATION, WITHOUT LONG-TERM CURRENT USE OF INSULIN (H): ICD-10-CM

## 2024-07-14 DIAGNOSIS — G43.909 MIGRAINE WITHOUT STATUS MIGRAINOSUS, NOT INTRACTABLE, UNSPECIFIED MIGRAINE TYPE: ICD-10-CM

## 2024-07-15 RX ORDER — TOPIRAMATE 100 MG/1
100 TABLET, FILM COATED ORAL 2 TIMES DAILY
Qty: 60 TABLET | Refills: 5 | Status: SHIPPED | OUTPATIENT
Start: 2024-07-15

## 2024-07-15 RX ORDER — METFORMIN HYDROCHLORIDE 750 MG/1
750 TABLET, EXTENDED RELEASE ORAL 2 TIMES DAILY WITH MEALS
Qty: 60 TABLET | Refills: 5 | Status: SHIPPED | OUTPATIENT
Start: 2024-07-15

## 2024-07-15 RX ORDER — SUMATRIPTAN 25 MG/1
25 TABLET, FILM COATED ORAL
Qty: 18 TABLET | Refills: 5 | Status: SHIPPED | OUTPATIENT
Start: 2024-07-15

## 2024-07-15 RX ORDER — TOPIRAMATE 50 MG/1
50 TABLET, FILM COATED ORAL 2 TIMES DAILY
Qty: 60 TABLET | Refills: 5 | Status: SHIPPED | OUTPATIENT
Start: 2024-07-15

## 2024-07-19 ENCOUNTER — ANCILLARY PROCEDURE (OUTPATIENT)
Dept: GENERAL RADIOLOGY | Facility: CLINIC | Age: 49
End: 2024-07-19
Attending: ORTHOPAEDIC SURGERY
Payer: COMMERCIAL

## 2024-07-19 ENCOUNTER — LAB (OUTPATIENT)
Dept: LAB | Facility: CLINIC | Age: 49
End: 2024-07-19
Payer: COMMERCIAL

## 2024-07-19 ENCOUNTER — OFFICE VISIT (OUTPATIENT)
Dept: ORTHOPEDICS | Facility: CLINIC | Age: 49
End: 2024-07-19
Attending: PHYSICIAN ASSISTANT
Payer: COMMERCIAL

## 2024-07-19 VITALS
SYSTOLIC BLOOD PRESSURE: 120 MMHG | BODY MASS INDEX: 57.52 KG/M2 | DIASTOLIC BLOOD PRESSURE: 72 MMHG | WEIGHT: 293 LBS | TEMPERATURE: 97.1 F | HEIGHT: 60 IN

## 2024-07-19 DIAGNOSIS — M75.42 IMPINGEMENT SYNDROME OF LEFT SHOULDER: ICD-10-CM

## 2024-07-19 DIAGNOSIS — M75.42 IMPINGEMENT SYNDROME, SHOULDER, LEFT: ICD-10-CM

## 2024-07-19 DIAGNOSIS — M75.42 IMPINGEMENT SYNDROME OF LEFT SHOULDER: Primary | ICD-10-CM

## 2024-07-19 LAB
CRP SERPL-MCNC: 9.84 MG/L
D DIMER PPP FEU-MCNC: 0.51 UG/ML FEU (ref 0–0.5)
ERYTHROCYTE [SEDIMENTATION RATE] IN BLOOD BY WESTERGREN METHOD: 17 MM/HR (ref 0–30)
RHEUMATOID FACT SERPL-ACNC: <10 IU/ML
URATE SERPL-MCNC: 5.1 MG/DL (ref 2.4–5.7)
VIT D+METAB SERPL-MCNC: 17 NG/ML (ref 20–50)

## 2024-07-19 PROCEDURE — 86200 CCP ANTIBODY: CPT

## 2024-07-19 PROCEDURE — 86431 RHEUMATOID FACTOR QUANT: CPT

## 2024-07-19 PROCEDURE — 73030 X-RAY EXAM OF SHOULDER: CPT | Mod: TC | Performed by: RADIOLOGY

## 2024-07-19 PROCEDURE — 86225 DNA ANTIBODY NATIVE: CPT

## 2024-07-19 PROCEDURE — 86140 C-REACTIVE PROTEIN: CPT

## 2024-07-19 PROCEDURE — 20610 DRAIN/INJ JOINT/BURSA W/O US: CPT | Mod: LT | Performed by: ORTHOPAEDIC SURGERY

## 2024-07-19 PROCEDURE — 85652 RBC SED RATE AUTOMATED: CPT

## 2024-07-19 PROCEDURE — 84550 ASSAY OF BLOOD/URIC ACID: CPT

## 2024-07-19 PROCEDURE — 82306 VITAMIN D 25 HYDROXY: CPT

## 2024-07-19 PROCEDURE — 85379 FIBRIN DEGRADATION QUANT: CPT

## 2024-07-19 PROCEDURE — 36415 COLL VENOUS BLD VENIPUNCTURE: CPT

## 2024-07-19 PROCEDURE — 86039 ANTINUCLEAR ANTIBODIES (ANA): CPT

## 2024-07-19 PROCEDURE — 86038 ANTINUCLEAR ANTIBODIES: CPT

## 2024-07-19 PROCEDURE — 99203 OFFICE O/P NEW LOW 30 MIN: CPT | Mod: 25 | Performed by: ORTHOPAEDIC SURGERY

## 2024-07-19 RX ADMIN — LIDOCAINE HYDROCHLORIDE 2 ML: 10 INJECTION, SOLUTION INFILTRATION; PERINEURAL at 08:27

## 2024-07-19 RX ADMIN — BUPIVACAINE HYDROCHLORIDE 2 ML: 5 INJECTION, SOLUTION PERINEURAL at 08:27

## 2024-07-19 RX ADMIN — TRIAMCINOLONE ACETONIDE 40 MG: 40 INJECTION, SUSPENSION INTRA-ARTICULAR; INTRAMUSCULAR at 08:27

## 2024-07-19 NOTE — PROGRESS NOTES
Large Joint Injection/Arthocentesis: L subacromial bursa    Date/Time: 7/19/2024 8:27 AM    Performed by: Micah Longoria MD  Authorized by: Micah Longoria MD    Indications:  Pain  Needle Size:  25 G  Guidance: landmark guided    Location:  Shoulder      Site:  L subacromial bursa  Medications:  2 mL lidocaine 1 %; 2 mL BUPivacaine 0.5 %; 40 mg triamcinolone 40 MG/ML  Outcome:  Tolerated well, no immediate complications  Procedure discussed: discussed risks, benefits, and alternatives    Consent Given by:  Patient  Timeout: timeout called immediately prior to procedure    Prep: patient was prepped and draped in usual sterile fashion

## 2024-07-19 NOTE — PATIENT INSTRUCTIONS
Thank you for choosing [facility]!      FOLLOW-UP  Dr. Longoria would like you to follow-up in 1 month. Please stop by the  on your way out or call [phone] to schedule.       LAB WORK   Dr. Longoria would like you to have some lab work completed. You can go to the Specialty Lab on the upper level to have this completed following your appointment, or you can call [phone] to schedule a future appointment if you do not have time to complete this today.       VITAMIN D SUPPLEMENTATION  Dr. Longoria has recommended that you take supplemental Vitamin D. You will take 5,000u of Vitamin D3 daily; this can be purchased over the counter at the pharmacy of your preference. You will also take 50,000u once per week, a prescription for this strength has been sent to your preferred pharmacy.       CARE AFTER YOUR INJECTION  Today you had a steroid injection of your left shoulder: Bursa.  *Please do not soak in a hot tub, bath, or swimming pool for the next 48 hours  *It is ok to shower  *Ice today and only do your normal amounts of activity  *The lidocaine (what is giving you pain relief right now) will likely stop working in 1-2 hours.  You will then have pain again, similar to before you received the injection. The corticosteroid will not start working until approximately 1-2 weeks from now.  *In a small percentage of people, cortisone can cause flushing/redness in the face. This usually lasts for 1-3 days and resolves. Cool compress, Ibuprofen/Tylenol can help if this happens.

## 2024-07-19 NOTE — LETTER
7/19/2024      Brittney Moon  [address]  [address]  [facility] [address]      Dear Colleague,    Thank you for referring your patient, Brittney Moon, to the [facility]. Please see a copy of my visit note below.    Large Joint Injection/Arthocentesis: L subacromial bursa    Date/Time: 7/19/2024 8:27 AM    Performed by: Micah Longoria MD  Authorized by: Micah Longoria MD    Indications:  Pain  Needle Size:  25 G  Guidance: landmark guided    Location:  Shoulder      Site:  L subacromial bursa  Medications:  2 mL lidocaine 1 %; 2 mL BUPivacaine 0.5 %; 40 mg triamcinolone 40 MG/ML  Outcome:  Tolerated well, no immediate complications  Procedure discussed: discussed risks, benefits, and alternatives    Consent Given by:  Patient  Timeout: timeout called immediately prior to procedure    Prep: patient was prepped and draped in usual sterile fashion          S:Patient presents with left shoulder pain which wakes her at night and keeps her from getting to sleep. Is in a wheel chair with LLE amputation.  Patient also with BMI 57.42.           Past Medical History:   Diagnosis Date     Back pain      Migraine      Nephrolithiasis      Osteoarthritis      Other convulsions     seizure disorder     Unspecified osteomyelitis, lower leg             Past Surgical History:   Procedure Laterality Date     CHOLECYSTECTOMY, LAPOROSCOPIC  1995     COLONOSCOPY N/A 10/18/2022    Procedure: COLONOSCOPY, WITH POLYPECTOMY;  Surgeon: Micah Reno MD;  Location:  GI     COMBINED CYSTOSCOPY, LASER HOLMIUM LITHOTRIPSY URETER(S)       EXCISE GANGLION WRIST Left 12/24/2018    Procedure: EXCISION LEFT WRIST GANGLION CYST;  Surgeon: Kehinde Pino DO;  Location: [facility]     INGUINAL HERNIA REPAIR Right      INJECT FACET JOINT Bilateral 11/29/2019    Procedure: lumbar sacral facet injection bilateral 4-5 sacral 1;  Surgeon: Canelo Zamora MD;  Location: [facility]     TOTAL KNEE ARTHROPLASTY Right 2012      WISDOM TOOTH EXTRACTION       ZZC AMPUTATION LOW LEG THRU TIB/FIB      Below knee amputation secondary to osteomyelitis            Social History     Tobacco Use     Smoking status: Never     Smokeless tobacco: Never   Substance Use Topics     Alcohol use: Yes     Comment: rare            Family History   Problem Relation Age of Onset     Snoring Mother      Sleep Apnea Mother                Allergies   Allergen Reactions     Food Anaphylaxis     cilantro     Codeine Other (See Comments)     Hydromorphone Nausea and Vomiting     Coriandrum Sativum Rash            Current Outpatient Medications   Medication Sig Dispense Refill     acetaminophen (TYLENOL) 500 MG tablet 1 tablet as needed Orally every 6 hrs       cyanocobalamin (VITAMIN B-12) 1000 MCG tablet Take 1,000 mcg by mouth daily       FLUoxetine (PROZAC) 40 MG capsule Take 80 mg by mouth every morning       HYDROcodone-acetaminophen (NORCO) 5-325 MG tablet 0.5 to 1 tablet as needed Orally (ok to fill 05/25/2023) every 8-12 hrs (max 2 tabs/day) for 30 days       metFORMIN (GLUCOPHAGE-XR) 750 MG 24 hr tablet Take 1 tablet (750 mg) by mouth 2 times daily (with meals) 60 tablet 5     methocarbamol (ROBAXIN) 500 MG tablet TAKE ONE TABLET BY MOUTH FOUR TIMES A DAY AS NEEDED FOR MUSCLE SPASMS 120 tablet 0     naproxen (NAPROSYN) 500 MG tablet Take 1 tablet (500 mg) by mouth 2 times daily (with meals) 60 tablet 1     simvastatin (ZOCOR) 5 MG tablet TAKE ONE TABLET (5MG) BY MOUTH AT BEDTIME 90 tablet 3     SUMAtriptan (IMITREX) 25 MG tablet Take 1 tablet (25 mg) by mouth at onset of headache for migraine 18 tablet 5     topiramate (TOPAMAX) 100 MG tablet Take 1 tablet (100 mg) by mouth 2 times daily (Take with 50 mg for totally dose of 150 mg twice daily) 60 tablet 5     topiramate (TOPAMAX) 50 MG tablet Take 1 tablet (50 mg) by mouth 2 times daily (Take with 100 mg tablet for total dose of 150 mg twice daily) 60 tablet 5     vitamin D3 (CHOLECALCIFEROL) 1.25 MG (97448  UT) capsule Take 1 capsule (50,000 Units) by mouth every 7 days for the next 12 weeks. 12 capsule 0     Vitamin D3 (CHOLECALCIFEROL) 25 mcg (1000 units) tablet Take 25 mcg by mouth daily            Review Of Systems  Skin: negative  Eyes: negative  Ears/Nose/Throat: negative  Respiratory: No shortness of breath, dyspnea on exertion, cough, or hemoptysis    O: physical exam:  Positive forced forward flexion impingement sign LUE.  Active abduction and forward flexion without dead arm syndrome.  CMS intact.  Pain about anterolateral acromion and bicipital groove to palpation.      Lab:Hgb A1C 6.8, update inflammatory markers and arthritic profile.    Images:R SHOULDER LEFT G/E 3 VIEWS  7/19/2024 7:35 AM      HISTORY: Impingement syndrome of left shoulder  COMPARISON: None                                                                      IMPRESSION: No acute fracture or malalignment. Mild glenohumeral and  acromioclavicular joint degenerative changes.          A:  Left shoulder bursitis      P:  Inject L shoulder bursitis after verbal and written consent, ethyl chloride, chloroprep.  Follow outcomes:  immediately better  Notify if exacerbation symptoms  See back 2-3 mos  Notify if exacerbation symptoms             In addition to the above assessment and plan each active problem on Brittney's problem list was evaluated today. This included the questioning of Brittney for any medication problems. We will continue the current treatment plan for these active problems except as noted.        Again, thank you for allowing me to participate in the care of your patient.        Sincerely,        Micah Longoria MD

## 2024-07-22 LAB
CCP AB SER IA-ACNC: 0.5 U/ML
DSDNA AB SER-ACNC: <0.6 IU/ML

## 2024-07-23 LAB
ANA PAT SER IF-IMP: ABNORMAL
ANA SER QL IF: POSITIVE
ANA TITR SER IF: ABNORMAL {TITER}

## 2024-07-25 RX ORDER — TRIAMCINOLONE ACETONIDE 40 MG/ML
40 INJECTION, SUSPENSION INTRA-ARTICULAR; INTRAMUSCULAR
Status: SHIPPED | OUTPATIENT
Start: 2024-07-19

## 2024-07-25 RX ORDER — BUPIVACAINE HYDROCHLORIDE 5 MG/ML
2 INJECTION, SOLUTION PERINEURAL
Status: SHIPPED | OUTPATIENT
Start: 2024-07-19

## 2024-07-25 RX ORDER — LIDOCAINE HYDROCHLORIDE 10 MG/ML
2 INJECTION, SOLUTION INFILTRATION; PERINEURAL
Status: SHIPPED | OUTPATIENT
Start: 2024-07-19

## 2024-07-25 NOTE — PROGRESS NOTES
S:Patient presents with left shoulder pain which wakes her at night and keeps her from getting to sleep. Is in a wheel chair with LLE amputation.  Patient also with BMI 57.42.           Past Medical History:   Diagnosis Date    Back pain     Migraine     Nephrolithiasis     Osteoarthritis     Other convulsions     seizure disorder    Unspecified osteomyelitis, lower leg             Past Surgical History:   Procedure Laterality Date    CHOLECYSTECTOMY, LAPOROSCOPIC  1995    COLONOSCOPY N/A 10/18/2022    Procedure: COLONOSCOPY, WITH POLYPECTOMY;  Surgeon: Micah Reno MD;  Location: [facility]    COMBINED CYSTOSCOPY, LASER HOLMIUM LITHOTRIPSY URETER(S)      EXCISE GANGLION WRIST Left 12/24/2018    Procedure: EXCISION LEFT WRIST GANGLION CYST;  Surgeon: Kehinde Pino DO;  [facility]: [facility]    INGUINAL HERNIA REPAIR Right     INJECT FACET JOINT Bilateral 11/29/2019    Procedure: lumbar sacral facet injection bilateral 4-5 sacral 1;  Surgeon: Canelo Zamora MD;  Location: [facility]    TOTAL KNEE ARTHROPLASTY Right 2012    WISDOM TOOTH EXTRACTION      ZZC AMPUTATION LOW LEG THRU TIB/FIB      Below knee amputation secondary to osteomyelitis            Social History     Tobacco Use    Smoking status: Never    Smokeless tobacco: Never   Substance Use Topics    Alcohol use: Yes     Comment: rare            Family History   Problem Relation Age of Onset    Snoring Mother     Sleep Apnea Mother                Allergies   Allergen Reactions    Food Anaphylaxis     cilantro    Codeine Other (See Comments)    Hydromorphone Nausea and Vomiting    Coriandrum Sativum Rash            Current Outpatient Medications   Medication Sig Dispense Refill    acetaminophen (TYLENOL) 500 MG tablet 1 tablet as needed Orally every 6 hrs      cyanocobalamin (VITAMIN B-12) 1000 MCG tablet Take 1,000 mcg by mouth daily      FLUoxetine (PROZAC) 40 MG capsule Take 80 mg by mouth every morning      HYDROcodone-acetaminophen (NORCO) 5-325 MG  tablet 0.5 to 1 tablet as needed Orally (ok to fill 05/25/2023) every 8-12 hrs (max 2 tabs/day) for 30 days      metFORMIN (GLUCOPHAGE-XR) 750 MG 24 hr tablet Take 1 tablet (750 mg) by mouth 2 times daily (with meals) 60 tablet 5    methocarbamol (ROBAXIN) 500 MG tablet TAKE ONE TABLET BY MOUTH FOUR TIMES A DAY AS NEEDED FOR MUSCLE SPASMS 120 tablet 0    naproxen (NAPROSYN) 500 MG tablet Take 1 tablet (500 mg) by mouth 2 times daily (with meals) 60 tablet 1    simvastatin (ZOCOR) 5 MG tablet TAKE ONE TABLET (5MG) BY MOUTH AT BEDTIME 90 tablet 3    SUMAtriptan (IMITREX) 25 MG tablet Take 1 tablet (25 mg) by mouth at onset of headache for migraine 18 tablet 5    topiramate (TOPAMAX) 100 MG tablet Take 1 tablet (100 mg) by mouth 2 times daily (Take with 50 mg for totally dose of 150 mg twice daily) 60 tablet 5    topiramate (TOPAMAX) 50 MG tablet Take 1 tablet (50 mg) by mouth 2 times daily (Take with 100 mg tablet for total dose of 150 mg twice daily) 60 tablet 5    vitamin D3 (CHOLECALCIFEROL) 1.25 MG (15935 UT) capsule Take 1 capsule (50,000 Units) by mouth every 7 days for the next 12 weeks. 12 capsule 0    Vitamin D3 (CHOLECALCIFEROL) 25 mcg (1000 units) tablet Take 25 mcg by mouth daily            Review Of Systems  Skin: negative  Eyes: negative  Ears/Nose/Throat: negative  Respiratory: No shortness of breath, dyspnea on exertion, cough, or hemoptysis    O: physical exam:  Positive forced forward flexion impingement sign LUE.  Active abduction and forward flexion without dead arm syndrome.  CMS intact.  Pain about anterolateral acromion and bicipital groove to palpation.      Lab:Hgb A1C 6.8, update inflammatory markers and arthritic profile.    Images:R SHOULDER LEFT G/E 3 VIEWS  7/19/2024 7:35 AM      HISTORY: Impingement syndrome of left shoulder  COMPARISON: None                                                                      IMPRESSION: No acute fracture or malalignment. Mild glenohumeral  and  acromioclavicular joint degenerative changes.          A:  Left shoulder bursitis      P:  Inject L shoulder bursitis after verbal and written consent, ethyl chloride, chloroprep.  Follow outcomes:  immediately better  Notify if exacerbation symptoms  See back 2-3 mos  Notify if exacerbation symptoms             In addition to the above assessment and plan each active problem on Brittney's problem list was evaluated today. This included the questioning of Brittney for any medication problems. We will continue the current treatment plan for these active problems except as noted.

## 2024-08-01 ENCOUNTER — TELEPHONE (OUTPATIENT)
Dept: FAMILY MEDICINE | Facility: OTHER | Age: 49
End: 2024-08-01
Payer: COMMERCIAL

## 2024-08-01 NOTE — TELEPHONE ENCOUNTER
Patient Quality Outreach    Patient is due for the following:   Diabetes -  Eye Exam    Next Steps:   No follow up needed at this time.    Type of outreach:    Sent Kab[employer] message.      Questions for provider review:    None           Anat Mathew CMA

## 2024-08-05 DIAGNOSIS — M62.830 SPASM OF BACK MUSCLES: ICD-10-CM

## 2024-08-05 RX ORDER — METHOCARBAMOL 500 MG/1
TABLET, FILM COATED ORAL
Qty: 120 TABLET | Refills: 0 | Status: SHIPPED | OUTPATIENT
Start: 2024-08-05

## 2024-09-29 ENCOUNTER — APPOINTMENT (OUTPATIENT)
Dept: GENERAL RADIOLOGY | Facility: CLINIC | Age: 49
End: 2024-09-29
Attending: FAMILY MEDICINE
Payer: COMMERCIAL

## 2024-09-29 ENCOUNTER — HOSPITAL ENCOUNTER (EMERGENCY)
Facility: CLINIC | Age: 49
Discharge: HOME | End: 2024-09-29
Attending: FAMILY MEDICINE | Admitting: FAMILY MEDICINE
Payer: COMMERCIAL

## 2024-09-29 VITALS
TEMPERATURE: 98.2 F | DIASTOLIC BLOOD PRESSURE: 80 MMHG | WEIGHT: 293 LBS | SYSTOLIC BLOOD PRESSURE: 122 MMHG | HEART RATE: 72 BPM | BODY MASS INDEX: 63.84 KG/M2 | RESPIRATION RATE: 18 BRPM | OXYGEN SATURATION: 99 %

## 2024-09-29 DIAGNOSIS — R07.89 CHEST WALL PAIN: ICD-10-CM

## 2024-09-29 LAB
ALBUMIN SERPL BCG-MCNC: 3.6 G/DL (ref 3.5–5.2)
ALP SERPL-CCNC: 97 U/L (ref 40–150)
ALT SERPL W P-5'-P-CCNC: 11 U/L (ref 0–50)
ANION GAP SERPL CALCULATED.3IONS-SCNC: 11 MMOL/L (ref 7–15)
AST SERPL W P-5'-P-CCNC: 11 U/L (ref 0–45)
BASOPHILS # BLD AUTO: 0 10E3/UL (ref 0–0.2)
BASOPHILS NFR BLD AUTO: 1 %
BILIRUB SERPL-MCNC: 0.2 MG/DL
BUN SERPL-MCNC: 18.7 MG/DL (ref 6–20)
CALCIUM SERPL-MCNC: 8.8 MG/DL (ref 8.8–10.4)
CHLORIDE SERPL-SCNC: 110 MMOL/L (ref 98–107)
CREAT SERPL-MCNC: 0.89 MG/DL (ref 0.51–0.95)
D DIMER PPP FEU-MCNC: 0.38 UG/ML FEU (ref 0–0.5)
EGFRCR SERPLBLD CKD-EPI 2021: 79 ML/MIN/1.73M2
EOSINOPHIL # BLD AUTO: 0.4 10E3/UL (ref 0–0.7)
EOSINOPHIL NFR BLD AUTO: 5 %
ERYTHROCYTE [DISTWIDTH] IN BLOOD BY AUTOMATED COUNT: 14.6 % (ref 10–15)
GLUCOSE SERPL-MCNC: 136 MG/DL (ref 70–99)
HCO3 SERPL-SCNC: 21 MMOL/L (ref 22–29)
HCT VFR BLD AUTO: 38.7 % (ref 35–47)
HGB BLD-MCNC: 12.4 G/DL (ref 11.7–15.7)
IMM GRANULOCYTES # BLD: 0 10E3/UL
IMM GRANULOCYTES NFR BLD: 0 %
LYMPHOCYTES # BLD AUTO: 2.2 10E3/UL (ref 0.8–5.3)
LYMPHOCYTES NFR BLD AUTO: 28 %
MCH RBC QN AUTO: 29.2 PG (ref 26.5–33)
MCHC RBC AUTO-ENTMCNC: 32 G/DL (ref 31.5–36.5)
MCV RBC AUTO: 91 FL (ref 78–100)
MONOCYTES # BLD AUTO: 0.5 10E3/UL (ref 0–1.3)
MONOCYTES NFR BLD AUTO: 7 %
NEUTROPHILS # BLD AUTO: 4.8 10E3/UL (ref 1.6–8.3)
NEUTROPHILS NFR BLD AUTO: 60 %
NRBC # BLD AUTO: 0 10E3/UL
NRBC BLD AUTO-RTO: 0 /100
NT-PROBNP SERPL-MCNC: 76 PG/ML (ref 0–900)
PLATELET # BLD AUTO: 258 10E3/UL (ref 150–450)
POTASSIUM SERPL-SCNC: 4.2 MMOL/L (ref 3.4–5.3)
PROT SERPL-MCNC: 6.7 G/DL (ref 6.4–8.3)
RBC # BLD AUTO: 4.24 10E6/UL (ref 3.8–5.2)
SODIUM SERPL-SCNC: 142 MMOL/L (ref 135–145)
TROPONIN T SERPL HS-MCNC: 9 NG/L
WBC # BLD AUTO: 7.9 10E3/UL (ref 4–11)

## 2024-09-29 PROCEDURE — 99285 EMERGENCY DEPT VISIT HI MDM: CPT | Mod: 25 | Performed by: FAMILY MEDICINE

## 2024-09-29 PROCEDURE — 71045 X-RAY EXAM CHEST 1 VIEW: CPT

## 2024-09-29 PROCEDURE — 36415 COLL VENOUS BLD VENIPUNCTURE: CPT | Performed by: FAMILY MEDICINE

## 2024-09-29 PROCEDURE — 85025 COMPLETE CBC W/AUTO DIFF WBC: CPT | Performed by: FAMILY MEDICINE

## 2024-09-29 PROCEDURE — 83880 ASSAY OF NATRIURETIC PEPTIDE: CPT | Performed by: FAMILY MEDICINE

## 2024-09-29 PROCEDURE — 84484 ASSAY OF TROPONIN QUANT: CPT | Performed by: FAMILY MEDICINE

## 2024-09-29 PROCEDURE — 96375 TX/PRO/DX INJ NEW DRUG ADDON: CPT | Performed by: FAMILY MEDICINE

## 2024-09-29 PROCEDURE — 85379 FIBRIN DEGRADATION QUANT: CPT | Performed by: FAMILY MEDICINE

## 2024-09-29 PROCEDURE — 99284 EMERGENCY DEPT VISIT MOD MDM: CPT | Performed by: FAMILY MEDICINE

## 2024-09-29 PROCEDURE — 96376 TX/PRO/DX INJ SAME DRUG ADON: CPT | Performed by: FAMILY MEDICINE

## 2024-09-29 PROCEDURE — 250N000011 HC RX IP 250 OP 636: Performed by: FAMILY MEDICINE

## 2024-09-29 PROCEDURE — 82040 ASSAY OF SERUM ALBUMIN: CPT | Performed by: FAMILY MEDICINE

## 2024-09-29 PROCEDURE — 96374 THER/PROPH/DIAG INJ IV PUSH: CPT | Performed by: FAMILY MEDICINE

## 2024-09-29 RX ORDER — HYDROMORPHONE HYDROCHLORIDE 1 MG/ML
0.5 INJECTION, SOLUTION INTRAMUSCULAR; INTRAVENOUS; SUBCUTANEOUS EVERY 30 MIN PRN
Status: COMPLETED | OUTPATIENT
Start: 2024-09-29 | End: 2024-09-29

## 2024-09-29 RX ORDER — KETOROLAC TROMETHAMINE 15 MG/ML
10 INJECTION, SOLUTION INTRAMUSCULAR; INTRAVENOUS ONCE
Status: COMPLETED | OUTPATIENT
Start: 2024-09-29 | End: 2024-09-29

## 2024-09-29 RX ADMIN — HYDROMORPHONE HYDROCHLORIDE 0.5 MG: 1 INJECTION, SOLUTION INTRAMUSCULAR; INTRAVENOUS; SUBCUTANEOUS at 21:20

## 2024-09-29 RX ADMIN — KETOROLAC TROMETHAMINE 10 MG: 15 INJECTION, SOLUTION INTRAMUSCULAR; INTRAVENOUS at 20:26

## 2024-09-29 RX ADMIN — HYDROMORPHONE HYDROCHLORIDE 0.5 MG: 1 INJECTION, SOLUTION INTRAMUSCULAR; INTRAVENOUS; SUBCUTANEOUS at 20:27

## 2024-09-29 RX ADMIN — HYDROMORPHONE HYDROCHLORIDE 0.5 MG: 1 INJECTION, SOLUTION INTRAMUSCULAR; INTRAVENOUS; SUBCUTANEOUS at 21:49

## 2024-09-29 ASSESSMENT — ACTIVITIES OF DAILY LIVING (ADL)
ADLS_ACUITY_SCORE: 40

## 2024-09-29 ASSESSMENT — COLUMBIA-SUICIDE SEVERITY RATING SCALE - C-SSRS
2. HAVE YOU ACTUALLY HAD ANY THOUGHTS OF KILLING YOURSELF IN THE PAST MONTH?: NO
1. IN THE PAST MONTH, HAVE YOU WISHED YOU WERE DEAD OR WISHED YOU COULD GO TO SLEEP AND NOT WAKE UP?: NO
6. HAVE YOU EVER DONE ANYTHING, STARTED TO DO ANYTHING, OR PREPARED TO DO ANYTHING TO END YOUR LIFE?: NO

## 2024-09-29 NOTE — Clinical Note
Brittney Moon was seen and treated in our emergency department on 9/29/2024.  She may return to work on 09/30/2024.       If you have any questions or concerns, please don't hesitate to call.      Betito Cummings MD

## 2024-09-30 NOTE — DISCHARGE INSTRUCTIONS
1.  Your pain seems to be more muscular, continue to alternate your Tylenol and ibuprofen.  Use your pain meds as prescribed.

## 2024-09-30 NOTE — ED PROVIDER NOTES
History     Chief Complaint   Patient presents with    Chest Pain     HPI  Brittney Moon is a 48 year old female who presents via EMS with complaints of a chest pain.  This started about 2 hours ago.  Is mainly in the left middle part of the chest.  Nothing seems to make it better, movement and touching the area makes it worse.  Patient has been in for an number of chest pain issues in the past and a lot of times has been more muscular or something related to the shoulder.  Patient did try her nitroglycerin but it did not help.  EMS gave the patient some fentanyl and that did help a little bit.  Patient denies any shortness of breath.  Denies any nausea or vomiting.  Patient denies any recent trauma or any strenuous activity.    Allergies:  Allergies   Allergen Reactions    Food Anaphylaxis     cilantro    Codeine Other (See Comments)    Hydromorphone Nausea and Vomiting    Coriandrum Sativum Rash       Problem List:    Patient Active Problem List    Diagnosis Date Noted    Complex regional pain syndrome i of left lower limb 07/29/2022     Priority: Medium    Opioid dependence (H) 07/29/2022     Priority: Medium    Chronic low back pain 08/23/2021     Priority: Medium    Inability to bear weight 02/11/2021     Priority: Medium    Fall at home, initial encounter 01/06/2020     Priority: Medium    Left hip pain 01/06/2020     Priority: Medium    Ganglion cyst 11/19/2018     Priority: Medium    Type 2 diabetes mellitus without complication, without long-term current use of insulin (H) 10/21/2018     Priority: Medium    Morbid obesity (H) 10/21/2018     Priority: Medium    Amputation of leg (H) 11/17/2017     Priority: Medium     Formatting of this note might be different from the original.  Overview:   15 surgeries; following club foot repair and osteomyelitis as childe      History of right knee joint replacement 11/17/2017     Priority: Medium    Anxiety disorder due to medical condition 08/03/2010     Priority:  Medium        Past Medical History:    Past Medical History:   Diagnosis Date    Back pain     Migraine     Nephrolithiasis     Osteoarthritis     Other convulsions     Unspecified osteomyelitis, lower leg        Past Surgical History:    Past Surgical History:   Procedure Laterality Date    CHOLECYSTECTOMY, LAPOROSCOPIC  1995    COLONOSCOPY N/A 10/18/2022    Procedure: COLONOSCOPY, WITH POLYPECTOMY;  Surgeon: Micah Reno MD;  [facility]    COMBINED CYSTOSCOPY, LASER HOLMIUM LITHOTRIPSY URETER(S)      EXCISE GANGLION WRIST Left 12/24/2018    Procedure: EXCISION LEFT WRIST GANGLION CYST;  Surgeon: Kehinde Pino DO;  [facility]    INGUINAL HERNIA REPAIR Right     INJECT FACET JOINT Bilateral 11/29/2019    Procedure: lumbar sacral facet injection bilateral 4-5 sacral 1;  Surgeon: Canelo Zamora MD;  [facility]: [facility]    TOTAL KNEE ARTHROPLASTY Right 2012    WISDOM TOOTH EXTRACTION      ZZC AMPUTATION LOW LEG THRU TIB/FIB      Below knee amputation secondary to osteomyelitis       Family History:    Family History   Problem Relation Age of Onset    Snoring Mother     Sleep Apnea Mother        Social History:  Marital Status:  Single [1]  Social History     Tobacco Use    Smoking status: Never    Smokeless tobacco: Never   Vaping Use    Vaping status: Never Used   Substance Use Topics    Alcohol use: Yes     Comment: rare    Drug use: No        Medications:    acetaminophen (TYLENOL) 500 MG tablet  cyanocobalamin (VITAMIN B-12) 1000 MCG tablet  FLUoxetine (PROZAC) 40 MG capsule  HYDROcodone-acetaminophen (NORCO) 5-325 MG tablet  metFORMIN (GLUCOPHAGE-XR) 750 MG 24 hr tablet  methocarbamol (ROBAXIN) 500 MG tablet  naproxen (NAPROSYN) 500 MG tablet  simvastatin (ZOCOR) 5 MG tablet  SUMAtriptan (IMITREX) 25 MG tablet  topiramate (TOPAMAX) 100 MG tablet  topiramate (TOPAMAX) 50 MG tablet  vitamin D3 (CHOLECALCIFEROL) 1.25 MG (47247 UT) capsule  Vitamin D3 (CHOLECALCIFEROL) 25 mcg (1000 units)  tablet          Review of Systems   All other systems reviewed and are negative.      Physical Exam   BP: 127/68  Pulse: 70  Temp: 98.2  F (36.8  C)  Resp: 11  Weight: 148.3 kg (326 lb 14.4 oz)  SpO2: 97 %      Physical Exam  Vitals and nursing note reviewed.   Constitutional:       General: She is not in acute distress.     Appearance: She is well-developed. She is not diaphoretic.   HENT:      Head: Normocephalic and atraumatic.      Nose: Nose normal.      Mouth/Throat:      Pharynx: No oropharyngeal exudate.   Eyes:      Conjunctiva/sclera: Conjunctivae normal.   Cardiovascular:      Rate and Rhythm: Normal rate and regular rhythm.      Heart sounds: Normal heart sounds. No murmur heard.     No friction rub.   Pulmonary:      Effort: Pulmonary effort is normal. No respiratory distress.      Breath sounds: Normal breath sounds. No stridor. No wheezing or rales.   Chest:      Chest wall: Tenderness present.   Abdominal:      General: Bowel sounds are normal. There is no distension.      Palpations: Abdomen is soft. There is no mass.      Tenderness: There is no abdominal tenderness. There is no guarding.   Musculoskeletal:         General: No tenderness. Normal range of motion.      Cervical back: Normal range of motion and neck supple.   Skin:     General: Skin is warm and dry.      Capillary Refill: Capillary refill takes less than 2 seconds.      Findings: No erythema.   Neurological:      Mental Status: She is alert and oriented to person, place, and time.   Psychiatric:         Judgment: Judgment normal.         ED Course        Procedures        Results for orders placed or performed during the hospital encounter of 09/29/24 (from the past 24 hour(s))   CBC with platelets differential    Narrative    The following orders were created for panel order CBC with platelets differential.  Procedure                               Abnormality         Status                     ---------                                -----------         ------                     CBC with platelets and d...[[id number]]                      Final result                 Please view results for these tests on the individual orders.   D dimer quantitative   Result Value Ref Range    D-Dimer Quantitative 0.38 0.00 - 0.50 ug/mL FEU    Narrative    This D-dimer assay is intended for use in conjunction with a clinical pretest probability assessment model to exclude pulmonary embolism (PE) and deep venous thrombosis (DVT) in outpatients suspected of PE or DVT. The cut-off value is 0.50 ug/mL FEU.    For patients 50 years of age or older, the application of age-adjusted cut-off values for D-Dimer may increase the specificity without significant effect on sensitivity. The literature suggested calculation age adjusted cut-off in ug/L = age in years x 10 ug/L. The results in this laboratory are reported as ug/mL rather than ug/L. The calculation for age adjusted cut off in ug/mL= age in years x 0.01 ug/mL. For example, the cut off for a 76 year old male is 76 x 0.01 ug/mL = 0.76 ug/mL (760 ug/L).    M Anu et al. Age adjusted D-dimer cut-off levels to rule out pulmonary embolism: The ADJUST-PE Study. FARHAT 2014;311:9475-3753.; HJ Devika et al. Diagnostic accuracy of conventional or age adjusted D-dimer cutoff values in older patients with suspected venous thromboembolism. Systemic review and meta-analysis. BMJ 2013:346:f2492.   Comprehensive metabolic panel   Result Value Ref Range    Sodium 142 135 - 145 mmol/L    Potassium 4.2 3.4 - 5.3 mmol/L    Carbon Dioxide (CO2) 21 (L) 22 - 29 mmol/L    Anion Gap 11 7 - 15 mmol/L    Urea Nitrogen 18.7 6.0 - 20.0 mg/dL    Creatinine 0.89 0.51 - 0.95 mg/dL    GFR Estimate 79 >60 mL/min/1.73m2    Calcium 8.8 8.8 - 10.4 mg/dL    Chloride 110 (H) 98 - 107 mmol/L    Glucose 136 (H) 70 - 99 mg/dL    Alkaline Phosphatase 97 40 - 150 U/L    AST 11 0 - 45 U/L    ALT 11 0 - 50 U/L    Protein Total 6.7 6.4 - 8.3 g/dL     Albumin 3.6 3.5 - 5.2 g/dL    Bilirubin Total 0.2 <=1.2 mg/dL   Troponin T, High Sensitivity   Result Value Ref Range    Troponin T, High Sensitivity 9 <=14 ng/L   Nt probnp inpatient (BNP)   Result Value Ref Range    N terminal Pro BNP Inpatient 76 0 - 900 pg/mL   CBC with platelets and differential   Result Value Ref Range    WBC Count 7.9 4.0 - 11.0 10e3/uL    RBC Count 4.24 3.80 - 5.20 10e6/uL    Hemoglobin 12.4 11.7 - 15.7 g/dL    Hematocrit 38.7 35.0 - 47.0 %    MCV 91 78 - 100 fL    MCH 29.2 26.5 - 33.0 pg    MCHC 32.0 31.5 - 36.5 g/dL    RDW 14.6 10.0 - 15.0 %    Platelet Count 258 150 - 450 10e3/uL    % Neutrophils 60 %    % Lymphocytes 28 %    % Monocytes 7 %    % Eosinophils 5 %    % Basophils 1 %    % Immature Granulocytes 0 %    NRBCs per 100 WBC 0 <1 /100    Absolute Neutrophils 4.8 1.6 - 8.3 10e3/uL    Absolute Lymphocytes 2.2 0.8 - 5.3 10e3/uL    Absolute Monocytes 0.5 0.0 - 1.3 10e3/uL    Absolute Eosinophils 0.4 0.0 - 0.7 10e3/uL    Absolute Basophils 0.0 0.0 - 0.2 10e3/uL    Absolute Immature Granulocytes 0.0 <=0.4 10e3/uL    Absolute NRBCs 0.0 10e3/uL   XR Chest Port 1 View    Narrative    EXAM: XR CHEST PORT 1 VIEW  LOCATION: [facility]  DATE: 9/29/2024    INDICATION: chest pain  COMPARISON: 6/18/2024      Impression    IMPRESSION: Negative chest.       Medications   HYDROmorphone (PF) (DILAUDID) injection 0.5 mg (0.5 mg Intravenous $Given 9/29/24 2120)   ketorolac (TORADOL) injection 10 mg (10 mg Intravenous $Given 9/29/24 2026)     Labs are reviewed and were unremarkable including a negative troponin and negative D-dimer.  I think that this pain is likely more muscular.  She continues to get some improvement with the pain medications given.  Recommend that she continue on her chronic pain medications, try some ice and heat on her chest.  Patient will follow-up if there is no improvement over the next few days.    Assessments & Plan (with Medical Decision  Making)  Muscular chest pain     I have reviewed the nursing notes.    I have reviewed the findings, diagnosis, plan and need for follow up with the patient.      New Prescriptions    No medications on file       Final diagnoses:   Chest wall pain       9/29/2024   [facility] EMERGENCY DEPT       Betito Cummings MD  09/29/24 [id number]

## 2024-09-30 NOTE — ED TRIAGE NOTES
Pt presents with left sided chest pain radiating to left arm. Pain and tingling. Pt given 1 nitroglycerin  and aspirin per EMS with no relief. Pt given 50 mcg of fentanyl with some relief. Pt had similar episode in June however found to be shoulder issue and shoulder was injected. Pt works at ED registration. Calm      Triage Assessment (Adult)       Row Name 09/29/24 2003          Triage Assessment    Airway WDL WDL        Respiratory WDL    Respiratory WDL WDL        Skin Circulation/Temperature WDL    Skin Circulation/Temperature WDL WDL        Cardiac WDL    Cardiac WDL X  Left sided chest pain. Tingling and pain down left arm        Peripheral/Neurovascular WDL    Peripheral Neurovascular WDL WDL        Cognitive/Neuro/Behavioral WDL    Cognitive/Neuro/Behavioral WDL WDL

## 2024-10-04 DIAGNOSIS — M75.42 IMPINGEMENT SYNDROME OF LEFT SHOULDER: ICD-10-CM

## 2024-10-04 RX ORDER — METHOCARBAMOL 750 MG/1
TABLET ORAL
Qty: 12 CAPSULE | Refills: 0 | Status: SHIPPED | OUTPATIENT
Start: 2024-10-04

## 2024-10-04 NOTE — TELEPHONE ENCOUNTER
Requested Prescriptions   Pending Prescriptions Disp Refills    D3-50 1.25 MG (27492 UT) capsule [Pharmacy Med Name: VITAMIN D3 50,000 U  (CHOLECALCIFEROL)] 12 capsule 0     Sig: TAKE ONE CAPSULE BY MOUTH ONCE EVERY 7 DAYS FOR THE NEXT 12 WEEKS       Vitamin Supplements Protocol Failed - 10/4/2024  5:09 AM        Failed - The patient does not have an order for high-dose vitamin D        Passed - Medication is active on med list        Passed - Medication indicated for associated diagnosis     The medication is prescribed for one or more of the following conditions:     Vitamin deficiency   Osteopenia   Osteoporosis   Nutrition counseling   Nutrition education   Feeding ability finding   Bone density finding   Morbid Obesity   History of bypass of stomach   Idiopathic peripheral neuropathy   General examination of patient   Vitamin D deficiency   Folate deficiency anemia due to dietary causes   Sleeve resection of stomach   Rheumatoid factor level - finding   Crohn's disease   Psoriatic arthritis   Transplant of Kidney   Arthropathy   Alcoholism              Passed - The patient is 2 years of age or older        Passed - Recent (12 mo) or future (90 days) visit within the authorizing provider's specialty     The patient must have completed an in-person or virtual visit within the past 12 months or has a future visit scheduled within the next 90 days with the authorizing provider s specialty.  Urgent care and e-visits do not quality as an office visit for this protocol.               Unable to fill per RN protocol, will forward to provider for review.         Bertha Mills RN on 10/4/2024 at 8:11 AM

## 2024-10-25 ENCOUNTER — TRANSFERRED RECORDS (OUTPATIENT)
Dept: HEALTH INFORMATION MANAGEMENT | Facility: CLINIC | Age: 49
End: 2024-10-25
Payer: COMMERCIAL

## 2024-11-11 NOTE — Clinical Note
This patient should be scheduled with Dr. Sam when the time is right. I will have discussed this patient with him by then. Thank you.
georgette

## 2024-11-15 DIAGNOSIS — E11.9 TYPE 2 DIABETES MELLITUS WITHOUT COMPLICATION, WITHOUT LONG-TERM CURRENT USE OF INSULIN (H): ICD-10-CM

## 2024-11-15 RX ORDER — SIMVASTATIN 5 MG
TABLET ORAL
Qty: 90 TABLET | Refills: 1 | Status: SHIPPED | OUTPATIENT
Start: 2024-11-15

## 2024-12-20 ENCOUNTER — TRANSFERRED RECORDS (OUTPATIENT)
Dept: HEALTH INFORMATION MANAGEMENT | Facility: CLINIC | Age: 49
End: 2024-12-20
Payer: COMMERCIAL

## 2024-12-21 ENCOUNTER — HEALTH MAINTENANCE LETTER (OUTPATIENT)
Age: 49
End: 2024-12-21

## 2024-12-23 DIAGNOSIS — M75.42 IMPINGEMENT SYNDROME OF LEFT SHOULDER: ICD-10-CM

## 2024-12-23 RX ORDER — METHOCARBAMOL 750 MG/1
TABLET ORAL
Qty: 12 CAPSULE | Refills: 0 | OUTPATIENT
Start: 2024-12-23

## 2025-01-07 ENCOUNTER — TELEPHONE (OUTPATIENT)
Dept: NEUROSURGERY | Facility: CLINIC | Age: 50
End: 2025-01-07
Payer: COMMERCIAL

## 2025-01-07 DIAGNOSIS — Q04.6 COLLOID CYST OF BRAIN (H): Primary | ICD-10-CM

## 2025-01-07 NOTE — TELEPHONE ENCOUNTER
Faxed  [facility] order to [facility]  January 7, 2025 to fax number [phone]    Right Fax confirmed at 2:35 PM    Jaylyn Rangel

## 2025-01-12 DIAGNOSIS — E11.9 TYPE 2 DIABETES MELLITUS WITHOUT COMPLICATION, WITHOUT LONG-TERM CURRENT USE OF INSULIN (H): ICD-10-CM

## 2025-01-13 RX ORDER — METFORMIN HYDROCHLORIDE 750 MG/1
750 TABLET, EXTENDED RELEASE ORAL 2 TIMES DAILY WITH MEALS
Qty: 60 TABLET | Refills: 0 | Status: SHIPPED | OUTPATIENT
Start: 2025-01-13

## 2025-01-20 ENCOUNTER — TRANSFERRED RECORDS (OUTPATIENT)
Dept: HEALTH INFORMATION MANAGEMENT | Facility: CLINIC | Age: 50
End: 2025-01-20
Payer: COMMERCIAL

## 2025-01-23 ENCOUNTER — APPOINTMENT (OUTPATIENT)
Dept: GENERAL RADIOLOGY | Facility: CLINIC | Age: 50
End: 2025-01-23
Attending: PHYSICIAN ASSISTANT
Payer: COMMERCIAL

## 2025-01-23 ENCOUNTER — OFFICE VISIT (OUTPATIENT)
Dept: FAMILY MEDICINE | Facility: OTHER | Age: 50
End: 2025-01-23
Payer: COMMERCIAL

## 2025-01-23 ENCOUNTER — HOSPITAL ENCOUNTER (EMERGENCY)
Facility: CLINIC | Age: 50
Discharge: HOME | End: 2025-01-23
Attending: PHYSICIAN ASSISTANT | Admitting: PHYSICIAN ASSISTANT
Payer: COMMERCIAL

## 2025-01-23 ENCOUNTER — MYC REFILL (OUTPATIENT)
Dept: FAMILY MEDICINE | Facility: OTHER | Age: 50
End: 2025-01-23

## 2025-01-23 VITALS
SYSTOLIC BLOOD PRESSURE: 128 MMHG | RESPIRATION RATE: 14 BRPM | OXYGEN SATURATION: 93 % | HEART RATE: 59 BPM | DIASTOLIC BLOOD PRESSURE: 69 MMHG | TEMPERATURE: 98 F

## 2025-01-23 VITALS
TEMPERATURE: 97.3 F | OXYGEN SATURATION: 97 % | RESPIRATION RATE: 16 BRPM | HEART RATE: 74 BPM | SYSTOLIC BLOOD PRESSURE: 130 MMHG | DIASTOLIC BLOOD PRESSURE: 88 MMHG | BODY MASS INDEX: 63.67 KG/M2 | WEIGHT: 293 LBS
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
## 2025-01-23 DIAGNOSIS — R10.9 RIGHT FLANK PAIN: ICD-10-CM

## 2025-01-23 DIAGNOSIS — F06.4 ANXIETY DISORDER DUE TO MEDICAL CONDITION: ICD-10-CM

## 2025-01-23 DIAGNOSIS — M25.522 LEFT ELBOW PAIN: ICD-10-CM

## 2025-01-23 DIAGNOSIS — M25.532 LEFT WRIST PAIN: ICD-10-CM

## 2025-01-23 DIAGNOSIS — S80.01XA CONTUSION OF RIGHT KNEE: ICD-10-CM

## 2025-01-23 DIAGNOSIS — G89.29 CHRONIC LOW BACK PAIN, UNSPECIFIED BACK PAIN LATERALITY, UNSPECIFIED WHETHER SCIATICA PRESENT: ICD-10-CM

## 2025-01-23 DIAGNOSIS — M79.642 PAIN OF LEFT HAND: ICD-10-CM

## 2025-01-23 DIAGNOSIS — E78.5 HYPERLIPIDEMIA LDL GOAL <130: ICD-10-CM

## 2025-01-23 DIAGNOSIS — Z12.31 VISIT FOR SCREENING MAMMOGRAM: ICD-10-CM

## 2025-01-23 DIAGNOSIS — M25.512 LEFT SHOULDER PAIN: ICD-10-CM

## 2025-01-23 DIAGNOSIS — E11.9 TYPE 2 DIABETES MELLITUS WITHOUT COMPLICATION, WITHOUT LONG-TERM CURRENT USE OF INSULIN (H): Primary | ICD-10-CM

## 2025-01-23 DIAGNOSIS — M54.50 CHRONIC LOW BACK PAIN, UNSPECIFIED BACK PAIN LATERALITY, UNSPECIFIED WHETHER SCIATICA PRESENT: ICD-10-CM

## 2025-01-23 DIAGNOSIS — Z12.4 CERVICAL CANCER SCREENING: ICD-10-CM

## 2025-01-23 DIAGNOSIS — G43.909 MIGRAINE WITHOUT STATUS MIGRAINOSUS, NOT INTRACTABLE, UNSPECIFIED MIGRAINE TYPE: ICD-10-CM

## 2025-01-23 LAB
ALBUMIN UR-MCNC: NEGATIVE MG/DL
ANION GAP SERPL CALCULATED.3IONS-SCNC: 11 MMOL/L (ref 7–15)
APPEARANCE UR: CLEAR
BILIRUB UR QL STRIP: NEGATIVE
BUN SERPL-MCNC: 10.5 MG/DL (ref 6–20)
CALCIUM SERPL-MCNC: 9 MG/DL (ref 8.8–10.4)
CHLORIDE SERPL-SCNC: 109 MMOL/L (ref 98–107)
CHOLEST SERPL-MCNC: 152 MG/DL
COLOR UR AUTO: YELLOW
CREAT SERPL-MCNC: 0.82 MG/DL (ref 0.51–0.95)
CREAT UR-MCNC: 117.3 MG/DL
EGFRCR SERPLBLD CKD-EPI 2021: 86 ML/MIN/1.73M2
EST. AVERAGE GLUCOSE BLD GHB EST-MCNC: 151 MG/DL
FASTING STATUS PATIENT QL REPORTED: YES
FASTING STATUS PATIENT QL REPORTED: YES
GLUCOSE SERPL-MCNC: 112 MG/DL (ref 70–99)
GLUCOSE UR STRIP-MCNC: NEGATIVE MG/DL
HBA1C MFR BLD: 6.9 % (ref 0–5.6)
HCO3 SERPL-SCNC: 24 MMOL/L (ref 22–29)
HDLC SERPL-MCNC: 47 MG/DL
HGB UR QL STRIP: NEGATIVE
KETONES UR STRIP-MCNC: NEGATIVE MG/DL
LDLC SERPL CALC-MCNC: 85 MG/DL
LEUKOCYTE ESTERASE UR QL STRIP: NEGATIVE
MICROALBUMIN UR-MCNC: <12 MG/L
MICROALBUMIN/CREAT UR: NORMAL MG/G{CREAT}
NITRATE UR QL: NEGATIVE
NONHDLC SERPL-MCNC: 105 MG/DL
PH UR STRIP: 5.5 [PH] (ref 5–7)
POTASSIUM SERPL-SCNC: 3.6 MMOL/L (ref 3.4–5.3)
SODIUM SERPL-SCNC: 144 MMOL/L (ref 135–145)
SP GR UR STRIP: 1.02 (ref 1–1.03)
TRIGL SERPL-MCNC: 98 MG/DL
UROBILINOGEN UR STRIP-ACNC: 0.2 E.U./DL

## 2025-01-23 PROCEDURE — 73562 X-RAY EXAM OF KNEE 3: CPT | Mod: RT

## 2025-01-23 PROCEDURE — 73030 X-RAY EXAM OF SHOULDER: CPT | Mod: LT

## 2025-01-23 PROCEDURE — 73080 X-RAY EXAM OF ELBOW: CPT | Mod: LT

## 2025-01-23 PROCEDURE — 73130 X-RAY EXAM OF HAND: CPT | Mod: LT

## 2025-01-23 PROCEDURE — 73110 X-RAY EXAM OF WRIST: CPT | Mod: LT

## 2025-01-23 PROCEDURE — 250N000013 HC RX MED GY IP 250 OP 250 PS 637: Performed by: PHYSICIAN ASSISTANT

## 2025-01-23 PROCEDURE — 99284 EMERGENCY DEPT VISIT MOD MDM: CPT | Performed by: PHYSICIAN ASSISTANT

## 2025-01-23 RX ORDER — CYCLOBENZAPRINE HCL 10 MG
5-10 TABLET ORAL 3 TIMES DAILY PRN
Qty: 90 TABLET | Refills: 1 | Status: SHIPPED | OUTPATIENT
Start: 2025-01-23

## 2025-01-23 RX ORDER — METFORMIN HYDROCHLORIDE 750 MG/1
750 TABLET, EXTENDED RELEASE ORAL 2 TIMES DAILY WITH MEALS
Qty: 180 TABLET | Refills: 1 | Status: SHIPPED | OUTPATIENT
Start: 2025-01-23

## 2025-01-23 RX ORDER — FLUOXETINE 40 MG/1
80 CAPSULE ORAL EVERY MORNING
Status: CANCELLED | OUTPATIENT
Start: 2025-01-23

## 2025-01-23 RX ORDER — HYDROCODONE BITARTRATE AND ACETAMINOPHEN 5; 325 MG/1; MG/1
1 TABLET ORAL ONCE
Status: COMPLETED | OUTPATIENT
Start: 2025-01-23 | End: 2025-01-23

## 2025-01-23 RX ADMIN — HYDROCODONE BITARTRATE AND ACETAMINOPHEN 1 TABLET: 5; 325 TABLET ORAL at 12:35

## 2025-01-23 ASSESSMENT — ANXIETY QUESTIONNAIRES
6. BECOMING EASILY ANNOYED OR IRRITABLE: NOT AT ALL
GAD7 TOTAL SCORE: 4
8. IF YOU CHECKED OFF ANY PROBLEMS, HOW DIFFICULT HAVE THESE MADE IT FOR YOU TO DO YOUR WORK, TAKE CARE OF THINGS AT HOME, OR GET ALONG WITH OTHER PEOPLE?: SOMEWHAT DIFFICULT
4. TROUBLE RELAXING: SEVERAL DAYS
GAD7 TOTAL SCORE: 4
GAD7 TOTAL SCORE: 4
7. FEELING AFRAID AS IF SOMETHING AWFUL MIGHT HAPPEN: NOT AT ALL
5. BEING SO RESTLESS THAT IT IS HARD TO SIT STILL: NOT AT ALL
2. NOT BEING ABLE TO STOP OR CONTROL WORRYING: SEVERAL DAYS
IF YOU CHECKED OFF ANY PROBLEMS ON THIS QUESTIONNAIRE, HOW DIFFICULT HAVE THESE PROBLEMS MADE IT FOR YOU TO DO YOUR WORK, TAKE CARE OF THINGS AT HOME, OR GET ALONG WITH OTHER PEOPLE: SOMEWHAT DIFFICULT
7. FEELING AFRAID AS IF SOMETHING AWFUL MIGHT HAPPEN: NOT AT ALL
3. WORRYING TOO MUCH ABOUT DIFFERENT THINGS: SEVERAL DAYS
1. FEELING NERVOUS, ANXIOUS, OR ON EDGE: SEVERAL DAYS

## 2025-01-23 ASSESSMENT — ACTIVITIES OF DAILY LIVING (ADL)
ADLS_ACUITY_SCORE: 60

## 2025-01-23 ASSESSMENT — ENCOUNTER SYMPTOMS: HEADACHES: 1

## 2025-01-23 NOTE — PROGRESS NOTES
Assessment & Plan     Type 2 diabetes mellitus without complication, without long-term current use of insulin (H)  Patient's A1c returned at 6.9% which is unchanged from previous check 6 months ago. She will continue medications without change and will follow up in 6 months.   - Hemoglobin A1c; Future  - Albumin Random Urine Quantitative with Creat Ratio; Future  - Hemoglobin A1c  - Albumin Random Urine Quantitative with Creat Ratio    Anxiety disorder due to medical condition  Patient has had increased stress as a result of a change of employers and recent exacerbation of her back pain. We discussed use of adjunctive medication such as wellbutrin or buspar if symptoms worsen. She will reach out to update me.     Hyperlipidemia LDL goal <130  The 10-year ASCVD risk score ([place] HUBBARD, et al., 2019) is: 2.2%    Values used to calculate the score:      Age: 49 years      Sex: Female      Is Non- : No      Diabetic: Yes      Tobacco smoker: No      Systolic Blood Pressure: 130 mmHg      Is BP treated: No      HDL Cholesterol: 50 mg/dL      Total Cholesterol: 155 mg/dL  Patient will continue statin medication as well as work on healthy lifestyle as she is able to. Will continue to monitor at future visits.   - Lipid Profile (Chol, Trig, HDL, LDL calc); Future  - Lipid Profile (Chol, Trig, HDL, LDL calc)    Right flank pain  Chronic low back pain  Patient had a fall at home this past month. She reports having tried to catch herself and ended up twisting her lower back in the process. She was seen at [facility] who completed MRI for her which identified severe arthropathy and contact on the right S1 nerve root. She reports numbness along the RLE consistent with this finding. She has met with her [facility] provider and is scheduled to see neurosurgeon in the near future. She is concerned about possible kidney stone as she says that she is also experiencing quite a bit of flank pain along the right  side of her torso. I will update UA and BMP today to evaluate for possible signs of stone. Pending this can have patient begin short course of flomax.    Patient has been using methocarbamol to help with muscle pains/spasms. She recently changed employment from [employer] to [employer]. She says that this change along with her recent fall has made her pain quite a bit worse. She would like to change from methocarbamol to flexeril. I am ok with this change. New Rx sent out.  - UA Macroscopic with reflex to Microscopic and Culture - Lab Collect; Future  - Basic metabolic panel  (Ca, Cl, CO2, Creat, Gluc, K, Na, BUN); Future  - UA Macroscopic with reflex to Microscopic and Culture - Lab Collect  - Basic metabolic panel  (Ca, Cl, CO2, Creat, Gluc, K, Na, BUN)    Cervical cancer screening  Patient was not interested in screening at this time due to back pain. Recommended that she plan on screen at annual preventative. She was receptive to this plan.     Visit for screening mammogram  Order placed, she will schedule at her convenience.   - MA Screen Bilateral w/Jamir; Future          BMI  Estimated body mass index is 63.67 kg/m  as calculated from the following:    Height as of 7/19/24: 1.524 m (5').    Weight as of this encounter: 147.9 kg (326 lb).   Weight management plan: Discussed healthy diet and exercise guidelines    Pritesh Gatica is a 49 year old, presenting for the following health issues:  Diabetes and Headache      1/23/2025     9:17 AM   Additional Questions   Roomed by Anat BUENROSTRO   Accompanied by self     She thinks she might have a kidney stone but putting off getting it looked at.    Headache     History of Present Illness       Diabetes:   She presents for follow up of diabetes.    She is not checking blood glucose.         She has no concerns regarding her diabetes at this time.  She is having numbness in feet and burning in feet.  The patient has not had a diabetic eye exam in the last 12 months.   "        Headaches:   Since the patient's last clinic visit, headaches are: no change  The patient is getting headaches:  2-4 times monthly  She is not able to do normal daily activities when she has a migraine.  The patient is taking the following rescue/relief medications:  Sumatriptan (Imitrex)   Patient states \"I get some relief\" from the rescue/relief medications.   The patient is taking the following medications to prevent migraines:  No medications to prevent migraines  In the past 4 weeks, the patient has gone to an Urgent Care or Emergency Room 0 times times due to headaches.    She eats 0-1 servings of fruits and vegetables daily.She consumes 0 sweetened beverage(s) daily.She exercises with enough effort to increase her heart rate 20 to 29 minutes per day.  She exercises with enough effort to increase her heart rate 4 days per week.   She is taking medications regularly.           Review of Systems  Constitutional, HEENT, cardiovascular, pulmonary, gi and gu systems are negative, except as otherwise noted.      Objective    /88   Pulse 74   Temp 97.3  F (36.3  C) (Temporal)   Resp 16   Wt (!) 147.9 kg (326 lb)   LMP  (LMP Unknown)   SpO2 97%   BMI 63.67 kg/m    Body mass index is 63.67 kg/m .  Physical Exam   GENERAL: alert and no distress  EYES: Eyes grossly normal to inspection, PERRL and conjunctivae and sclerae normal  HENT: ear canals and TM's normal, nose and mouth without ulcers or lesions  NECK: no adenopathy, no asymmetry, masses, or scars  RESP: lungs clear to auscultation - no rales, rhonchi or wheezes  CV: regular rate and rhythm, normal S1 S2, no S3 or S4, no murmur, click or rub, no peripheral edema  PSYCH: mentation appears normal, affect normal/bright    Results for orders placed or performed during the hospital encounter of 01/23/25   XR Knee Right 3 Views     Status: None    Narrative    EXAM: XR KNEE RIGHT 3 VIEWS  LOCATION: [facility]  DATE: " 1/23/2025    INDICATION: fall, anterior knee pain  COMPARISON: None.      Impression    IMPRESSION: Postoperative changes right total knee arthroplasty and patellar resurfacing. Components appear well seated. No significant effusion.   XR Shoulder Left 3 Views     Status: None    Narrative    EXAM: XR SHOULDER LEFT G/E 3 VIEWS  LOCATION: [facility] [place]  DATE: 1/23/2025    INDICATION: fall, scapula and superior mid humerus pain  COMPARISON: None.      Impression    IMPRESSION: The left glenohumeral and acromioclavicular joints are negative for fracture or dislocation. There is minimal degenerative change at both joints. The left clavicle is negative.   XR Elbow Left G/E 3 Views     Status: None    Narrative    EXAM: XR ELBOW LEFT G/E 3 VIEWS  LOCATION: [facility] [place]  DATE: 1/23/2025    INDICATION: fall, left shoulder pain  COMPARISON: None.      Impression    IMPRESSION: Degenerative change at the left ulnar trochlear articulation with osteophytic spurring. There is a calcification anterior to the distal humerus. This is corticated and chronic in appearance but may be loose within the elbow joint. This is not   suggestive of an acute fracture fragment. No significant effusion.   XR Wrist Left 3 Views     Status: None    Narrative    EXAM: XR HAND LEFT G/E 3 VIEWS, XR WRIST LEFT G/E 3 VIEWS  LOCATION: [facility] [place]  DATE: 1/23/2025    INDICATION: fall, left hand pain  COMPARISON: None.      Impression    IMPRESSION: The left wrist is negative for fracture. Normal carpal alignment. The left hand is negative for fracture or dislocation.   XR Hand Left 3 Views     Status: None    Narrative    EXAM: XR HAND LEFT G/E 3 VIEWS, XR WRIST LEFT G/E 3 VIEWS  LOCATION: [facility] [place]  DATE: 1/23/2025    INDICATION: fall, left hand pain  COMPARISON: None.      Impression    IMPRESSION: The left wrist is negative for  fracture. Normal carpal alignment. The left hand is negative for fracture or dislocation.   Results for orders placed or performed in visit on 01/23/25   Hemoglobin A1c     Status: Abnormal   Result Value Ref Range    Estimated Average Glucose 151 (H) <117 mg/dL    Hemoglobin A1C 6.9 (H) 0.0 - 5.6 %   Albumin Random Urine Quantitative with Creat Ratio     Status: None   Result Value Ref Range    Creatinine Urine mg/dL 117.3 mg/dL    Albumin Urine mg/L <12.0 mg/L    Albumin Urine mg/g Cr     Lipid Profile (Chol, Trig, HDL, LDL calc)     Status: Abnormal   Result Value Ref Range    Cholesterol 152 <200 mg/dL    Triglycerides 98 <150 mg/dL    Direct Measure HDL 47 (L) >=50 mg/dL    LDL Cholesterol Calculated 85 <100 mg/dL    Non HDL Cholesterol 105 <130 mg/dL    Patient Fasting > 8hrs? Yes     Narrative    Cholesterol  Desirable: < 200 mg/dL  Borderline High: 200 - 239 mg/dL  High: >= 240 mg/dL    Triglycerides  Normal: < 150 mg/dL  Borderline High: 150 - 199 mg/dL  High: 200-499 mg/dL  Very High: >= 500 mg/dL    Direct Measure HDL  Female: >= 50 mg/dL   Male: >= 40 mg/dL    LDL Cholesterol  Desirable: < 100 mg/dL  Above Desirable: 100 - 129 mg/dL   Borderline High: 130 - 159 mg/dL   High:  160 - 189 mg/dL   Very High: >= 190 mg/dL    Non HDL Cholesterol  Desirable: < 130 mg/dL  Above Desirable: 130 - 159 mg/dL  Borderline High: 160 - 189 mg/dL  High: 190 - 219 mg/dL  Very High: >= 220 mg/dL   UA Macroscopic with reflex to Microscopic and Culture - Lab Collect     Status: Normal    Specimen: Urine, NOS   Result Value Ref Range    Color Urine Yellow Colorless, Straw, Light Yellow, Yellow    Appearance Urine Clear Clear    Glucose Urine Negative Negative mg/dL    Bilirubin Urine Negative Negative    Ketones Urine Negative Negative mg/dL    Specific Gravity Urine 1.025 1.003 - 1.035    Blood Urine Negative Negative    pH Urine 5.5 5.0 - 7.0    Protein Albumin Urine Negative Negative mg/dL    Urobilinogen Urine 0.2 0.2,  1.0 E.U./dL    Nitrite Urine Negative Negative    Leukocyte Esterase Urine Negative Negative    Narrative    Microscopic not indicated   Basic metabolic panel  (Ca, Cl, CO2, Creat, Gluc, K, Na, BUN)     Status: Abnormal   Result Value Ref Range    Sodium 144 135 - 145 mmol/L    Potassium 3.6 3.4 - 5.3 mmol/L    Chloride 109 (H) 98 - 107 mmol/L    Carbon Dioxide (CO2) 24 22 - 29 mmol/L    Anion Gap 11 7 - 15 mmol/L    Urea Nitrogen 10.5 6.0 - 20.0 mg/dL    Creatinine 0.82 0.51 - 0.95 mg/dL    GFR Estimate 86 >60 mL/min/1.73m2    Calcium 9.0 8.8 - 10.4 mg/dL    Glucose 112 (H) 70 - 99 mg/dL    Patient Fasting > 8hrs? Yes            Signed Electronically by: Pelon Cotter PA-C

## 2025-01-23 NOTE — DISCHARGE INSTRUCTIONS
It was a pleasure working with you today!  I hope your condition improves rapidly!     Thankfully, there is no sign of fracture.  Please ice the painful areas for 20 minutes every couple hours.  After 48 hours of ice, then switch to using heat.  Start gentle range of motion exercises of the shoulder and elbow right away tomorrow to maintain mobility of these joints.  Use the sling in between for support if needed.  It is okay to use your home pain medication for any breakthrough pain.

## 2025-01-23 NOTE — TELEPHONE ENCOUNTER
Clinic RN: Please investigate patient's chart or contact patient if the information cannot be found because FLUOXETINE: the medication is listed as historical or discontinued. Confirm patient is taking this medication. Document findings and route refill encounter to provider for approval or denial. Sumatriptan: BP out of range.    Emmie MARTINN, RN, PHN

## 2025-01-23 NOTE — ED TRIAGE NOTES
Here with left shoulder pain after fall from wheel chair this AM. No LOC-no neck pain. Right knee tenderness reported as well.     Triage Assessment (Adult)       Row Name 01/23/25 1131          Triage Assessment    Airway WDL WDL        Respiratory WDL    Respiratory WDL WDL        Skin Circulation/Temperature WDL    Skin Circulation/Temperature WDL WDL        Cardiac WDL    Cardiac WDL WDL        Peripheral/Neurovascular WDL    Peripheral Neurovascular WDL WDL        Cognitive/Neuro/Behavioral WDL    Cognitive/Neuro/Behavioral WDL WDL

## 2025-01-23 NOTE — ED PROVIDER NOTES
History     Chief Complaint   Patient presents with    Fall    Shoulder Pain     HPI  Brittney Moon is a 49 year old female who presents for evaluation of a fall.  She has a mechanical wheelchair.  She was rolling into [place] and states that the chair stopped but she did not.  She fell forward.  Landed on an outstretched arm.  Has pain throughout her entire left arm but also has pain in the anterior right knee.  She does have a history of left lower leg amputation.  She reports her pain 9 on a scale of 10 and is requesting something for pain.  Denies any new numbness or tingling.  Denies any head or neck injury.  No LOC.  She remembers the whole event.  EMS was called and transported her here.        Allergies:  Allergies   Allergen Reactions    Food Anaphylaxis     cilantro    Codeine Other (See Comments)    Hydromorphone Nausea and Vomiting    Coriandrum Sativum Rash       Problem List:    Patient Active Problem List    Diagnosis Date Noted    Complex regional pain syndrome i of left lower limb 07/29/2022     Priority: Medium    Opioid dependence (H) 07/29/2022     Priority: Medium    Chronic low back pain 08/23/2021     Priority: Medium    Inability to bear weight 02/11/2021     Priority: Medium    Fall at home, initial encounter 01/06/2020     Priority: Medium    Left hip pain 01/06/2020     Priority: Medium    Ganglion cyst 11/19/2018     Priority: Medium    Type 2 diabetes mellitus without complication, without long-term current use of insulin (H) 10/21/2018     Priority: Medium    Morbid obesity (H) 10/21/2018     Priority: Medium    Amputation of leg (H) 11/17/2017     Priority: Medium     Formatting of this note might be different from the original.  Overview:   15 surgeries; following club foot repair and osteomyelitis as childe      History of right knee joint replacement 11/17/2017     Priority: Medium    Anxiety disorder due to medical condition 08/03/2010     Priority: Medium        Past Medical  History:    Past Medical History:   Diagnosis Date    Back pain     Migraine     Nephrolithiasis     Osteoarthritis     Other convulsions     Unspecified osteomyelitis, lower leg        Past Surgical History:    Past Surgical History:   Procedure Laterality Date    CHOLECYSTECTOMY, LAPOROSCOPIC  1995    COLONOSCOPY N/A 10/18/2022    Procedure: COLONOSCOPY, WITH POLYPECTOMY;  Surgeon: Micah Reno MD;  Location: [facility]    COMBINED CYSTOSCOPY, LASER HOLMIUM LITHOTRIPSY URETER(S)      EXCISE GANGLION WRIST Left 12/24/2018    Procedure: EXCISION LEFT WRIST GANGLION CYST;  Surgeon: Kehinde Pino DO;  Location: [facility]    INGUINAL HERNIA REPAIR Right     INJECT FACET JOINT Bilateral 11/29/2019    Procedure: lumbar sacral facet injection bilateral 4-5 sacral 1;  Surgeon: Canelo Zamora MD;  Location: [facility]    TOTAL KNEE ARTHROPLASTY Right 2012    WISDOM TOOTH EXTRACTION      ZZC AMPUTATION LOW LEG THRU TIB/FIB      Below knee amputation secondary to osteomyelitis       Family History:    Family History   Problem Relation Age of Onset    Snoring Mother     Sleep Apnea Mother        Social History:  Marital Status:  Single [1]  Social History     Tobacco Use    Smoking status: Never    Smokeless tobacco: Never   Vaping Use    Vaping status: Never Used   Substance Use Topics    Alcohol use: Yes     Comment: rare    Drug use: No        Medications:    acetaminophen (TYLENOL) 500 MG tablet  cyanocobalamin (VITAMIN B-12) 1000 MCG tablet  cyclobenzaprine (FLEXERIL) 10 MG tablet  D3-50 1.25 MG (94403 UT) capsule  FLUoxetine (PROZAC) 40 MG capsule  HYDROcodone-acetaminophen (NORCO) 5-325 MG tablet  metFORMIN (GLUCOPHAGE-XR) 750 MG 24 hr tablet  naproxen (NAPROSYN) 500 MG tablet  simvastatin (ZOCOR) 5 MG tablet  SUMAtriptan (IMITREX) 25 MG tablet  topiramate (TOPAMAX) 100 MG tablet  topiramate (TOPAMAX) 50 MG tablet  Vitamin D3 (CHOLECALCIFEROL) 25 mcg (1000 units) tablet          Review of Systems   All other systems  reviewed and are negative.      Physical Exam   BP: (!) 141/80  Pulse: 62  Temp: 97.6  F (36.4  C)  Resp: 14  SpO2: 94 %      Physical Exam  Vitals and nursing note reviewed.   Constitutional:       General: She is not in acute distress.     Appearance: She is not diaphoretic.   HENT:      Head: Normocephalic and atraumatic.      Right Ear: External ear normal.      Left Ear: External ear normal.      Nose: Nose normal.      Mouth/Throat:      Pharynx: No oropharyngeal exudate.   Eyes:      General: No scleral icterus.        Right eye: No discharge.         Left eye: No discharge.      Conjunctiva/sclera: Conjunctivae normal.      Pupils: Pupils are equal, round, and reactive to light.   Neck:      Thyroid: No thyromegaly.      Comments: No pain with axial loading.  No midline or paravertebral tenderness.  Cardiovascular:      Rate and Rhythm: Normal rate and regular rhythm.      Heart sounds: Normal heart sounds. No murmur heard.  Pulmonary:      Effort: Pulmonary effort is normal. No respiratory distress.      Breath sounds: Normal breath sounds. No wheezing or rales.   Chest:      Chest wall: No tenderness.   Abdominal:      General: Bowel sounds are normal. There is no distension.      Palpations: Abdomen is soft. There is no mass.      Tenderness: There is no abdominal tenderness. There is no guarding or rebound.   Musculoskeletal:      Cervical back: Normal range of motion and neck supple. No rigidity or tenderness.      Comments: She does not want to move the left upper extremity secondary to pain.  She has minor tenderness of the superior scapula on the left.  No clavicular tenderness.  Minor tenderness over the superior humerus and mid humerus.  Lateral elbow tender to palpation.  Has tenderness of the mid forearm, radial aspect of the wrist, and all of the metacarpal bones.  No deformity, bruising, swelling, or laceration.  No break in the skin.  Right lower extremity with isolated tenderness over the  patella and anterior knee joint line.  No deformity, bruising, or swelling.  No tenderness of the lower leg, ankle, or foot.  Hip with no tenderness.  Distal pulses 2+.  Sensation intact to light touch.   Lymphadenopathy:      Cervical: No cervical adenopathy.   Skin:     General: Skin is warm and dry.      Capillary Refill: Capillary refill takes less than 2 seconds.      Findings: No erythema or rash.   Neurological:      Mental Status: She is alert and oriented to person, place, and time.      Cranial Nerves: No cranial nerve deficit.   Psychiatric:         Behavior: Behavior normal.         Thought Content: Thought content normal.         ED Course        Procedures              Critical Care time:  none              Results for orders placed or performed during the hospital encounter of 01/23/25 (from the past 24 hours)   XR Knee Right 3 Views    Narrative    EXAM: XR KNEE RIGHT 3 VIEWS  LOCATION: [facility]  DATE: 1/23/2025    INDICATION: fall, anterior knee pain  COMPARISON: None.      Impression    IMPRESSION: Postoperative changes right total knee arthroplasty and patellar resurfacing. Components appear well seated. No significant effusion.   XR Shoulder Left 3 Views    Narrative    EXAM: XR SHOULDER LEFT G/E 3 VIEWS  LOCATION: [facility]  DATE: 1/23/2025    INDICATION: fall, scapula and superior mid humerus pain  COMPARISON: None.      Impression    IMPRESSION: The left glenohumeral and acromioclavicular joints are negative for fracture or dislocation. There is minimal degenerative change at both joints. The left clavicle is negative.   XR Wrist Left 3 Views    Narrative    EXAM: XR HAND LEFT G/E 3 VIEWS, XR WRIST LEFT G/E 3 VIEWS  LOCATION: [facility]  DATE: 1/23/2025    INDICATION: fall, left hand pain  COMPARISON: None.      Impression    IMPRESSION: The left wrist is negative for fracture. Normal carpal  alignment. The left hand is negative for fracture or dislocation.   XR Hand Left 3 Views    Narrative    EXAM: XR HAND LEFT G/E 3 VIEWS, XR WRIST LEFT G/E 3 VIEWS  LOCATION: [facility]  DATE: 1/23/2025    INDICATION: fall, left hand pain  COMPARISON: None.      Impression    IMPRESSION: The left wrist is negative for fracture. Normal carpal alignment. The left hand is negative for fracture or dislocation.   XR Elbow Left G/E 3 Views    Narrative    EXAM: XR ELBOW LEFT G/E 3 VIEWS  LOCATION: [facility]  DATE: 1/23/2025    INDICATION: fall, left shoulder pain  COMPARISON: None.      Impression    IMPRESSION: Degenerative change at the left ulnar trochlear articulation with osteophytic spurring. There is a calcification anterior to the distal humerus. This is corticated and chronic in appearance but may be loose within the elbow joint. This is not   suggestive of an acute fracture fragment. No significant effusion.       Medications   HYDROcodone-acetaminophen (NORCO) 5-325 MG per tablet 1 tablet (1 tablet Oral $Given 1/23/25 1235)       Assessments & Plan (with Medical Decision Making)     Fall  Contusion of right knee  Left shoulder pain  Left elbow pain  Left wrist pain  Pain of left hand     49 year old female presents for evaluation of a fall out of her wheelchair.  Notes right knee, left shoulder, left elbow, left forearm, left wrist, and left hand pain.  No head or neck injury.  /69   Pulse 59   Temp 98  F (36.7  C) (Oral)   Resp 14   LMP  (LMP Unknown)   SpO2 93%    General Healthy-appearing no acute distress.  Neurologically intact.  Nonfocal exam.  No sign of head injury.  No Dixon sign.  No hemotympanum.   left arm and right leg without any sign of obvious trauma.  No bruising, abrasion, laceration, or deformity.  Tender to palpation in multiple spots of the left upper extremity and the right knee.  Remainder of the exam is otherwise  negative.  Patient was given hydrocodone for pain with great success.  X-rays all showed no sign of fracture.  I performed independent review of these x-rays and agree with the findings.  Discussed conservative care management options.  She requested something to support her arm.  I gave her a sling.  Discussed that she should start early and regular range of motion exercises within 2 days to avoid any adhesive capsulitis complications.  Ice to the painful areas for 20 minutes every couple hours initially and then switch to heat.  OTC pain medications and she can use her home breakthrough pain medication.  Indications for ED return reviewed.  Patient in agreement.    have reviewed the nursing notes.    I have reviewed the findings, diagnosis, plan and need for follow up with the patient.           Medical Decision Making  The patient's presentation was of moderate complexity (an acute complicated injury).    The patient's evaluation involved:  ordering and/or review of 3+ test(s) in this encounter (see separate area of note for details)    The patient's management necessitated moderate risk (prescription drug management including medications given in the ED).        New Prescriptions    No medications on file       Final diagnoses:   Fall   Contusion of right knee   Left shoulder pain   Left elbow pain   Left wrist pain   Pain of left hand       Disclaimer: This note consists of symbols derived from keyboarding, dictation and/or voice recognition software. As a result, there may be errors in the script that have gone undetected. Please consider this when interpreting information found in this chart.        1/23/2025   [facility] EMERGENCY DEPT       Riccardo Robbins PA-C  01/23/25 [id number]

## 2025-01-27 ENCOUNTER — PATIENT OUTREACH (OUTPATIENT)
Dept: CARE COORDINATION | Facility: CLINIC | Age: 50
End: 2025-01-27
Payer: COMMERCIAL

## 2025-01-27 NOTE — PROGRESS NOTES
[facility] Care Resource Center Contact  [facility]     Clinical Data: Care Coordination ED-sourced Outreach-     Outreach attempted x 2.  Unable to leave voicemail. Patient can call [facility]'s 24/7 scheduling and nurse triage phone number [phone] ([phone]) for questions/concerns and/or to schedule an appt with an [facility] provider.      Care Coordination introduction letter with explanation of Clinic Care Coordination services sent to patient via [employer]. Clinic Care Coordination services remain available via referral if needed.    Plan: [facility] will do no further outreaches at this time.       Helen Suarez MA  Connected Care Resource Center, [facility]    *Connected Care Resource Team does NOT follow patient ongoing. Referrals are identified based on internal discharge reports and the outreach is to ensure patient has an understanding of their discharge instructions.

## 2025-01-27 NOTE — LETTER
Brittney Moon  [address]    [place] [address]    Dear Brittney Moon,      I am a team member within the Connected Care Resource Center with [facility]. I recently tried to reach you to ensure you were doing well following a recent visit within our health system. I also wanted to take this chance to introduce Clinic Care Coordination.     Below is a description of Clinic Care Coordination and how this team can further assist you:       The Clinic Care Coordination team is made up of a Registered Nurse, , Financial Resource Worker, and a Community Health Worker who understand and can help navigate the health care system. The goal of clinic care coordination is to help you manage your health, improve access to care, and achieve optimal health outcomes. They work alongside your provider to assist you in determining your health and social needs, obtain health care and community resources, and provide you with necessary information and education. Clinic Care Coordination can work with you through any barriers and develop a care plan that helps coordinate and strengthen the relationship between you and your care team.    If you wish to connect with the Clinic Care Coordination Team, please let your [facility] Primary Care Provider or Clinic Care Team know and they can place a referral. The Clinic Care Coordination team will then reach out by phone to further support you.    We are focused on providing you with the highest-quality healthcare experience possible.    Sincerely,   Your care team with [facility]'s [address] [place] [address] ([phone]).    Helen Suarez, [facility]  Care Coordination

## 2025-01-28 NOTE — TELEPHONE ENCOUNTER
My chart remains unread.     Attempted to call patient. No answer. Voicemail box full.    Laura Palacios BSN, RN

## 2025-01-29 NOTE — TELEPHONE ENCOUNTER
Patient Contact    Attempt # 2    RN did attempt to reach patient, Sierra is unread. No answer. Message left for patient to call the clinic back and ask to speak to a member of the care team. Wanting to determine if still taking Prozac as listed as historical/discontinued.     Jessica Rowe, RN on 1/29/2025 at 4:52 PM

## 2025-01-30 RX ORDER — SUMATRIPTAN SUCCINATE 25 MG/1
25 TABLET ORAL
Qty: 18 TABLET | Refills: 5 | Status: SHIPPED | OUTPATIENT
Start: 2025-01-30

## 2025-02-18 ENCOUNTER — TRANSFERRED RECORDS (OUTPATIENT)
Dept: HEALTH INFORMATION MANAGEMENT | Facility: CLINIC | Age: 50
End: 2025-02-18
Payer: COMMERCIAL

## 2025-02-26 ENCOUNTER — TELEPHONE (OUTPATIENT)
Dept: FAMILY MEDICINE | Facility: OTHER | Age: 50
End: 2025-02-26
Payer: COMMERCIAL

## 2025-02-26 NOTE — TELEPHONE ENCOUNTER
INCOMING FORMS    Sender: [employer]     Type of Form, letter or note (What is requested?): orders    How was the form received?: Fax    How should forms be returned?:  Fax : [phone]    Form placed in JR bin for review/signature if appropriate.

## 2025-03-04 ENCOUNTER — MYC MEDICAL ADVICE (OUTPATIENT)
Dept: FAMILY MEDICINE | Facility: OTHER | Age: 50
End: 2025-03-04
Payer: COMMERCIAL

## 2025-03-05 ENCOUNTER — THERAPY VISIT (OUTPATIENT)
Dept: SLEEP MEDICINE | Facility: CLINIC | Age: 50
End: 2025-03-05
Payer: COMMERCIAL

## 2025-03-05 DIAGNOSIS — G47.00 PERSISTENT INSOMNIA: ICD-10-CM

## 2025-03-05 DIAGNOSIS — R06.83 SNORING: ICD-10-CM

## 2025-03-05 DIAGNOSIS — G90.522 COMPLEX REGIONAL PAIN SYNDROME I OF LEFT LOWER LIMB: ICD-10-CM

## 2025-03-05 DIAGNOSIS — E66.01 MORBID OBESITY (H): ICD-10-CM

## 2025-03-06 NOTE — PROGRESS NOTES
Completed a split night PSG per provider order.    Preliminary AHI 38.6.  A final therapeutic PAP pressure was achieved.    Supine REM was seen on therapeutic pressure.    Patient reports feeling refreshed in AM.

## 2025-03-07 ENCOUNTER — ANCILLARY PROCEDURE (OUTPATIENT)
Dept: GENERAL RADIOLOGY | Facility: OTHER | Age: 50
End: 2025-03-07
Attending: PHYSICIAN ASSISTANT
Payer: COMMERCIAL
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
## 2025-03-07 DIAGNOSIS — F07.81 POST CONCUSSION SYNDROME: ICD-10-CM

## 2025-03-07 PROCEDURE — 70260 X-RAY EXAM OF SKULL: CPT | Mod: TC | Performed by: RADIOLOGY

## 2025-03-18 ENCOUNTER — TELEPHONE (OUTPATIENT)
Dept: FAMILY MEDICINE | Facility: OTHER | Age: 50
End: 2025-03-18
Payer: COMMERCIAL

## 2025-03-18 ENCOUNTER — MYC MEDICAL ADVICE (OUTPATIENT)
Dept: FAMILY MEDICINE | Facility: OTHER | Age: 50
End: 2025-03-18
Payer: COMMERCIAL

## 2025-03-18 LAB — SLPCOMP: NORMAL

## 2025-03-18 NOTE — TELEPHONE ENCOUNTER
INCOMING FORMS    Sender: [employer]    Type of Form, letter or note (What is requested?): alexander    How was the form received?: Fax    How should forms be returned?:  Fax : [phone]    Form placed in JR bin for review/signature if appropriate.      Patient does not have LISA on file, sent blank LISA to [employer].

## 2025-04-16 ENCOUNTER — TELEPHONE (OUTPATIENT)
Dept: FAMILY MEDICINE | Facility: OTHER | Age: 50
End: 2025-04-16
Payer: COMMERCIAL

## 2025-04-16 DIAGNOSIS — G89.29 CHRONIC LOW BACK PAIN, UNSPECIFIED BACK PAIN LATERALITY, UNSPECIFIED WHETHER SCIATICA PRESENT: ICD-10-CM

## 2025-04-16 DIAGNOSIS — M54.50 CHRONIC LOW BACK PAIN, UNSPECIFIED BACK PAIN LATERALITY, UNSPECIFIED WHETHER SCIATICA PRESENT: ICD-10-CM

## 2025-04-16 NOTE — TELEPHONE ENCOUNTER
INCOMING FORMS    Sender: [employer]     Type of Form, letter or note (What is requested?): orders    How was the form received?: Fax    How should forms be returned?:  Fax : [phone]    Form placed in JR bin for review/signature if appropriate.

## 2025-04-17 RX ORDER — CYCLOBENZAPRINE HCL 10 MG
TABLET ORAL
Qty: 90 TABLET | Refills: 0 | Status: SHIPPED | OUTPATIENT
Start: 2025-04-17

## 2025-04-18 ENCOUNTER — TRANSFERRED RECORDS (OUTPATIENT)
Dept: HEALTH INFORMATION MANAGEMENT | Facility: CLINIC | Age: 50
End: 2025-04-18
Payer: COMMERCIAL

## 2025-04-28 ENCOUNTER — PATIENT OUTREACH (OUTPATIENT)
Dept: CARE COORDINATION | Facility: CLINIC | Age: 50
End: 2025-04-28
Payer: COMMERCIAL

## 2025-04-28 ENCOUNTER — TELEPHONE (OUTPATIENT)
Dept: PHYSICAL MEDICINE AND REHAB | Facility: CLINIC | Age: 50
End: 2025-04-28
Payer: COMMERCIAL

## 2025-04-28 NOTE — TELEPHONE ENCOUNTER
Left Voicemail (1st Attempt) and Sent Mychart (1st Attempt) for the patient to call back and schedule the following:    Appointment type: Return Concussion  Provider: Jacqueline Isabel  Return date: around 6/25/25  Specialty phone number: [phone]  Additional appointment(s) needed: MRI  Additonal Notes: 2 month follow up    Marni Monte on 4/28/2025 at 11:25 AM

## 2025-04-29 ENCOUNTER — TELEPHONE (OUTPATIENT)
Dept: FAMILY MEDICINE | Facility: OTHER | Age: 50
End: 2025-04-29
Payer: COMMERCIAL

## 2025-04-29 NOTE — TELEPHONE ENCOUNTER
INCOMING FORMS    Sender: [facility]    Type of Form, letter or note (What is requested?): FMLA forms    How was the form received?: Fax    How should forms be returned?:  Fax : [phone]    Form placed in JR bin for review/signature if appropriate.

## 2025-04-30 ENCOUNTER — PATIENT OUTREACH (OUTPATIENT)
Dept: CARE COORDINATION | Facility: CLINIC | Age: 50
End: 2025-04-30
Payer: COMMERCIAL

## 2025-04-30 ENCOUNTER — TELEPHONE (OUTPATIENT)
Dept: PHYSICAL MEDICINE AND REHAB | Facility: CLINIC | Age: 50
End: 2025-04-30
Payer: COMMERCIAL

## 2025-04-30 NOTE — TELEPHONE ENCOUNTER
Left Voicemail (2nd Attempt) for the patient to call back and schedule the following:    Appointment type: Return Concussion-Virtual  Provider: Jacqueline Isabel  Return date: around 6/25/25  Specialty phone number: [phone]  Additional appointment(s) needed: MRI  Additonal Notes: 2 month follow up    Marni Monte on 4/30/2025 at 5:10 PM

## 2025-05-19 ENCOUNTER — THERAPY VISIT (OUTPATIENT)
Dept: PHYSICAL THERAPY | Facility: CLINIC | Age: 50
End: 2025-05-19
Attending: PHYSICIAN ASSISTANT
Payer: COMMERCIAL

## 2025-05-19 ENCOUNTER — TELEPHONE (OUTPATIENT)
Dept: FAMILY MEDICINE | Facility: OTHER | Age: 50
End: 2025-05-19
Payer: COMMERCIAL

## 2025-05-19 DIAGNOSIS — S09.90XA HEAD INJURY, INITIAL ENCOUNTER: ICD-10-CM

## 2025-05-19 DIAGNOSIS — F07.81 POST CONCUSSION SYNDROME: ICD-10-CM

## 2025-05-19 PROCEDURE — 97161 PT EVAL LOW COMPLEX 20 MIN: CPT | Mod: GP | Performed by: PHYSICAL THERAPIST

## 2025-05-19 PROCEDURE — 97110 THERAPEUTIC EXERCISES: CPT | Mod: GP | Performed by: PHYSICAL THERAPIST

## 2025-05-19 NOTE — PROGRESS NOTES
PHYSICAL THERAPY EVALUATION  Type of Visit: Evaluation       Fall Risk Screen:  Have you fallen 2 or more times in the past year?: Yes  Have you fallen and had an injury in the past year?: Yes      Subjective         Presenting condition or subjective complaint: concussion symptoms referred to PT post concussion symptoms. ONSET: motorized chair stopped on it's own (needs to be replaced due to mechanical issues) in the hallway and she slid out of her chair and  hit her head. No previous history of concussion. Current Activities:  rest, slow movement and cautious, OT and referred to [facility]. Notes reading is challenging. Has been trying to take 1 day at a time. Notes new symptoms arise and she does reflect to determine if concussion or not.   Date of onset: 04/25/25    Relevant medical history: Arthritis; Concussions; Depression; Diabetes; Dizziness; Migraines or headaches; Osteoarthritis; Overweight; Pain at night or rest; Severe headaches; Vision problems   Dates & types of surgery: several revisions of left leg amputation [id number], gallbladder removal 1995,ganglion cyst removal 2018 lrft wrist    Prior diagnostic imaging/testing results: X-ray   from 3-7-2025 EPIC XR report for skull: No displaced fracture or destructive osseous lesion identified. Paranasal sinuses appear well aerated and without air-fluid levels. If there is ongoing clinical concern for radiographically occult fracture or intracranial injury consider CT.  Prior therapy history for the same diagnosis, illness or injury: No      Prior Level of Function  Transfers: Independent  Ambulation: Completely dependent  ADL: Independent  IADL: Driving, Finances, Housekeeping, Laundry, Meal preparation    Living Environment  Social support: Alone   Type of home: 1 level   Stairs to enter the home: No       Ramp: Yes   Stairs inside the home: No       Help at home: None  Equipment owned: Crutches; Power wheelchair or scooter; Grab bars; Commode; Bath  bench; Lift chair     Employment: Yes medical care coordinator at 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    Patient goals for therapy: constant headaches,blurru vision,neck stiffness    Pain assessment:   HA:   Location L lateral parietal - head L side of face), migraines are more behind eyes and top of head  VAS: currently: 3/10, 1/10 increasing to 8/10  Description: aching pain that makes uer nauseous and migraine, concussion: lie she hit head all over again, short lasting, annoyance, lasts 1/2 hour to 45 minutes  Increase: watching TV, loud noise,   Decrease: turn down sound, distance from people,      NECK:  Location: L cervical paraspinal mm,   Symptoms: tense  VAS: currently: 4/10, 2/10 increasing to 8/10  Increase: movement in general, crocheting - flexion too long (30 minutes now and hours without break),   Decreased: extension  Issues with L shoulder from the past but feels baseline at this time.    Low back is baseline    Objective   CERVICAL SPINE EVALUATION  INTEGUMENTARY (edema, incisions): WNL  POSTURE: forward  ADDITIONAL HISTORY:  Paresthesia (yes/no):  no    Disturbed sleep (yes/no): yes, neck issues  Number of pillows: Normal: back and shifts to L sidelying. Hugs body pillow  Previous episodes (0/1-5/6-10/11+): months Year of first episode: 2025    Specific Questions: (as reported by the patient)  Dizziness/Tinnitus/Nausea/Swallowing (pos/neg): dizziness, nausea, describes signs and symptoms of BPPV including rolling in bed, quick head movements, flexion, lay->sit. Will send DHI to her through my chart to complete for next session.   Gait/Upper Limbs (normal/abnormal): normal  Recent or major surgery (yes/no): no  Night pain (yes/no): yes  Unexplained weight loss (yes/no): no    Movement Loss:   Ryan Mod Min Nil Symptoms   Protrusion   X  Neck strain, off balance x 1, x 6 increases losing balance   Flexion   X  No change   Retraction  X    Increased at end range x 1, x 6 no change   Extension X    R SB. No symptoms change   Lateral flexion R    X No change   Lateral flexion L  X   ear aches, into shoulder L, reproducing L sided headache   Rotation R    X No change   Rotation L   X  neck and behind L ear       Assessment & Plan   CLINICAL IMPRESSIONS  Medical Diagnosis: Post concussion syndrome (F07.81)    Head injury, initial encounter (S09.90XA)    Treatment Diagnosis: Post concussion syndrome (F07.81)    Head injury, initial encounter (S09.90XA)   Impression/Assessment: Patient is a 49 year old female with reading, sleeping, transfers/balance, dizziness complaints.  The following significant findings have been identified: Pain, Decreased ROM/flexibility, Impaired balance, Impaired muscle performance, Decreased activity tolerance, Impaired posture, and Dizziness impaired vision.  These impairments interfere with their ability to perform self care tasks, recreational activities, household chores, driving , household mobility, and community mobility as compared to previous level of function.     Clinical Decision Making (Complexity):  Clinical Presentation: Stable/Uncomplicated  Clinical Presentation Rationale: based on medical and personal factors listed in PT evaluation  Clinical Decision Making (Complexity): Low complexity    PLAN OF CARE  Treatment Interventions:  Modalities: as needed for symptoms  Interventions: Manual Therapy, Neuromuscular Re-education, Therapeutic Activity, Therapeutic Exercise, Self-Care/Home Management, Canalith Repositioning    Long Term Goals     PT Goal 1  Goal Identifier: Sleep  Goal Description: Gavi will report an increase of 1 hour in her sleep with 1 wake time to feel more refreshed in the morning  Target Date: 06/27/25  PT Goal 2  Goal Identifier: Head and neck symptoms  Goal Description: Michael will complete her home program to decrease her head and neck pain  Rationale: to maximize safety and independence with  performance of ADLs and functional tasks;to maximize safety and independence with self cares  Target Date: 06/27/25  PT Goal 3  Goal Identifier: Dizziness  Goal Description: Long will complete the CRP and specific testing for dizziness and report 0/10 room spinning dizziness  Rationale: to maximize safety and independence with performance of ADLs and functional tasks;to maximize safety and independence within the home;to maximize safety and independence with transportation;to maximize safety and independence within the community;to maximize safety and independence with self cares  Target Date: 06/19/25      Frequency of Treatment: 1 time per week  Duration of Treatment: 8 weeks    Recommended Referrals to Other Professionals: no other needs identified at this time  Education Assessment:   Learner/Method: Patient;Listening;Reading;Demonstration;Pictures/Video;No Barriers to Learning (PTRX on phone)  Education Comments: see above, goals and plan of care    Risks and benefits of evaluation/treatment have been explained.   Patient/Family/caregiver agrees with Plan of Care.     Evaluation Time:     PT Eval, Low Complexity Minutes (49640): 22   Present: Not applicable     Signing Clinician: Anitha José, PT

## 2025-05-19 NOTE — TELEPHONE ENCOUNTER
INCOMING FORMS    Sender: [facility]    Type of Form, letter or note (What is requested?): FMLA    How was the form received?: Fax    How should forms be returned?:  Fax : [phone]    Form placed in JR bin for review/signature if appropriate.

## 2025-05-20 NOTE — TELEPHONE ENCOUNTER
Placed in MA task box. Please fax and scan.     Thanks,  Pelon Cotter PA-C on 5/20/2025 at 10:53 AM

## 2025-05-27 ENCOUNTER — TELEPHONE (OUTPATIENT)
Dept: FAMILY MEDICINE | Facility: OTHER | Age: 50
End: 2025-05-27
Payer: COMMERCIAL

## 2025-05-27 ENCOUNTER — TELEPHONE (OUTPATIENT)
Dept: PHYSICAL THERAPY | Facility: CLINIC | Age: 50
End: 2025-05-27
Payer: COMMERCIAL

## 2025-05-27 NOTE — TELEPHONE ENCOUNTER
INCOMING FORMS    Sender: [employer]    Type of Form, letter or note (What is requested?): orders    How was the form received?: Fax    How should forms be returned?:  Fax : [phone]    Form placed in JR bin for review/signature if appropriate.

## 2025-05-28 ENCOUNTER — THERAPY VISIT (OUTPATIENT)
Dept: OCCUPATIONAL THERAPY | Facility: CLINIC | Age: 50
End: 2025-05-28
Attending: PHYSICIAN ASSISTANT
Payer: COMMERCIAL

## 2025-05-28 DIAGNOSIS — S09.90XA HEAD INJURY, INITIAL ENCOUNTER: ICD-10-CM

## 2025-05-28 DIAGNOSIS — F07.81 POST CONCUSSION SYNDROME: Primary | ICD-10-CM

## 2025-05-28 PROCEDURE — 97130 THER IVNTJ EA ADDL 15 MIN: CPT | Mod: GO

## 2025-05-28 PROCEDURE — 97129 THER IVNTJ 1ST 15 MIN: CPT | Mod: GO

## 2025-05-29 ENCOUNTER — MEDICAL CORRESPONDENCE (OUTPATIENT)
Dept: HEALTH INFORMATION MANAGEMENT | Facility: CLINIC | Age: 50
End: 2025-05-29
Payer: COMMERCIAL

## 2025-06-16 ENCOUNTER — TELEPHONE (OUTPATIENT)
Dept: PHYSICAL THERAPY | Facility: CLINIC | Age: 50
End: 2025-06-16
Payer: COMMERCIAL

## 2025-06-17 DIAGNOSIS — M54.50 CHRONIC LOW BACK PAIN, UNSPECIFIED BACK PAIN LATERALITY, UNSPECIFIED WHETHER SCIATICA PRESENT: ICD-10-CM

## 2025-06-17 DIAGNOSIS — G89.29 CHRONIC LOW BACK PAIN, UNSPECIFIED BACK PAIN LATERALITY, UNSPECIFIED WHETHER SCIATICA PRESENT: ICD-10-CM

## 2025-06-17 RX ORDER — CYCLOBENZAPRINE HCL 10 MG
TABLET ORAL
Qty: 90 TABLET | Refills: 0 | Status: SHIPPED | OUTPATIENT
Start: 2025-06-17

## 2025-06-23 ENCOUNTER — THERAPY VISIT (OUTPATIENT)
Dept: PHYSICAL THERAPY | Facility: CLINIC | Age: 50
End: 2025-06-23
Attending: PHYSICIAN ASSISTANT
Payer: COMMERCIAL

## 2025-06-23 DIAGNOSIS — F07.81 POST CONCUSSION SYNDROME: Primary | ICD-10-CM

## 2025-06-23 DIAGNOSIS — S09.90XA HEAD INJURY, INITIAL ENCOUNTER: ICD-10-CM

## 2025-06-23 PROCEDURE — 95992 CANALITH REPOSITIONING PROC: CPT | Mod: GP | Performed by: PHYSICAL THERAPIST

## 2025-06-24 ENCOUNTER — MYC MEDICAL ADVICE (OUTPATIENT)
Dept: FAMILY MEDICINE | Facility: OTHER | Age: 50
End: 2025-06-24
Payer: COMMERCIAL

## 2025-06-24 PROBLEM — M54.16 LUMBAR RADICULOPATHY: Status: ACTIVE | Noted: 2025-06-24

## 2025-06-24 RX ORDER — MELOXICAM 15 MG/1
TABLET ORAL
COMMUNITY
Start: 2025-06-18

## 2025-06-24 RX ORDER — TRAZODONE HYDROCHLORIDE 100 MG/1
TABLET ORAL
COMMUNITY
Start: 2025-03-18

## 2025-06-30 ENCOUNTER — THERAPY VISIT (OUTPATIENT)
Dept: PHYSICAL THERAPY | Facility: CLINIC | Age: 50
End: 2025-06-30
Attending: PHYSICIAN ASSISTANT
Payer: COMMERCIAL

## 2025-06-30 DIAGNOSIS — F07.81 POST CONCUSSION SYNDROME: Primary | ICD-10-CM

## 2025-06-30 DIAGNOSIS — S09.90XA HEAD INJURY, INITIAL ENCOUNTER: ICD-10-CM

## 2025-06-30 PROCEDURE — 97140 MANUAL THERAPY 1/> REGIONS: CPT | Mod: GP | Performed by: PHYSICAL THERAPIST

## 2025-06-30 PROCEDURE — 97110 THERAPEUTIC EXERCISES: CPT | Mod: GP | Performed by: PHYSICAL THERAPIST

## 2025-07-01 ENCOUNTER — TELEPHONE (OUTPATIENT)
Dept: FAMILY MEDICINE | Facility: OTHER | Age: 50
End: 2025-07-01

## 2025-07-01 ENCOUNTER — THERAPY VISIT (OUTPATIENT)
Dept: OCCUPATIONAL THERAPY | Facility: CLINIC | Age: 50
End: 2025-07-01
Attending: PHYSICIAN ASSISTANT
Payer: COMMERCIAL

## 2025-07-01 DIAGNOSIS — F07.81 POST CONCUSSION SYNDROME: Primary | ICD-10-CM

## 2025-07-01 PROCEDURE — 97530 THERAPEUTIC ACTIVITIES: CPT | Mod: GO

## 2025-07-01 NOTE — TELEPHONE ENCOUNTER
INCOMING FORMS    Sender: [facility]     Type of Form, letter or note (What is requested?): provider recommendation for medical leave    How was the form received?: Fax    How should forms be returned?:  Fax : [phone]    Form placed in JR bin for review/signature if appropriate.

## 2025-07-05 ENCOUNTER — HEALTH MAINTENANCE LETTER (OUTPATIENT)
Age: 50
End: 2025-07-05

## 2025-07-07 ENCOUNTER — THERAPY VISIT (OUTPATIENT)
Dept: PHYSICAL THERAPY | Facility: CLINIC | Age: 50
End: 2025-07-07
Attending: PHYSICIAN ASSISTANT
Payer: COMMERCIAL

## 2025-07-07 DIAGNOSIS — S09.90XA HEAD INJURY, INITIAL ENCOUNTER: ICD-10-CM

## 2025-07-07 DIAGNOSIS — F07.81 POST CONCUSSION SYNDROME: Primary | ICD-10-CM

## 2025-07-07 PROCEDURE — 95992 CANALITH REPOSITIONING PROC: CPT | Mod: GP | Performed by: PHYSICAL THERAPIST

## 2025-07-07 PROCEDURE — 97140 MANUAL THERAPY 1/> REGIONS: CPT | Mod: GP,59 | Performed by: PHYSICAL THERAPIST

## 2025-07-08 ENCOUNTER — THERAPY VISIT (OUTPATIENT)
Dept: OCCUPATIONAL THERAPY | Facility: CLINIC | Age: 50
End: 2025-07-08
Attending: PHYSICIAN ASSISTANT
Payer: COMMERCIAL

## 2025-07-08 DIAGNOSIS — F07.81 POST CONCUSSION SYNDROME: Primary | ICD-10-CM

## 2025-07-08 PROCEDURE — 97530 THERAPEUTIC ACTIVITIES: CPT | Mod: GO

## 2025-07-22 ENCOUNTER — THERAPY VISIT (OUTPATIENT)
Dept: OCCUPATIONAL THERAPY | Facility: CLINIC | Age: 50
End: 2025-07-22
Attending: PHYSICIAN ASSISTANT
Payer: COMMERCIAL

## 2025-07-22 DIAGNOSIS — G89.29 CHRONIC LOW BACK PAIN, UNSPECIFIED BACK PAIN LATERALITY, UNSPECIFIED WHETHER SCIATICA PRESENT: ICD-10-CM

## 2025-07-22 DIAGNOSIS — F07.81 POST CONCUSSION SYNDROME: Primary | ICD-10-CM

## 2025-07-22 DIAGNOSIS — M54.50 CHRONIC LOW BACK PAIN, UNSPECIFIED BACK PAIN LATERALITY, UNSPECIFIED WHETHER SCIATICA PRESENT: ICD-10-CM

## 2025-07-22 PROCEDURE — 97130 THER IVNTJ EA ADDL 15 MIN: CPT | Mod: GO

## 2025-07-22 PROCEDURE — 97129 THER IVNTJ 1ST 15 MIN: CPT | Mod: GO

## 2025-07-22 RX ORDER — CYCLOBENZAPRINE HCL 10 MG
TABLET ORAL
Qty: 90 TABLET | Refills: 0 | Status: SHIPPED | OUTPATIENT
Start: 2025-07-22

## 2025-07-23 NOTE — PROGRESS NOTES
DISCHARGE  Reason for Discharge: Patient has met all goals. Patient chooses to discontinue therapy. Patient has failed to schedule further appointments. Discharge is being completed at this time due to a combination of good progress towards goal areas and it is Gavi's last scheduled OT appointment. Gavi was able to display good progress during this treatment period as she was able to achieve 4 out of 4 goals.       Discharge Plan: Patient to continue home programs.    Referring Provider:  Jacqueline Killian       07/22/25 0500   Appointment Info   Treating Provider Kehinde Robles, OTR/L   Visits Used 6   Medical Diagnosis Post concussion syndrome (F07.81)    Head injury, initial encounter (S09.90XA)   OT Tx Diagnosis Post concussion syndrome   Progress Note/Certification   Onset of Illness/Injury or Date of Surgery 04/25/25   Therapy Frequency 1 x week   Predicted Duration 90 days   Progress Note Due Date 08/06/25       Present No   Goals   OT Goals 1;2;3;4   OT Goal 1   Goal Identifier Energy Conservation/Fatigue Management   Goal Description Patient will implement strategies like pacing activities, taking scheduled breaks, and prioritizing tasks to conserve energy throughout the day, on 50% of opportunities, within 90 days.   Goal Progress Gavi has been able to implement multiple fatigue management and energy conservation techniques into her daily life. She reports that they have assisted her with increasing her energy levels. Goal Met.   Target Date 08/06/25   Date Met 07/22/25   OT Goal 2   Goal Identifier Visual Tolerance   Goal Description Patient will be able to tolerate visual activity (saccades, pursuits, convergence) x10 minutes without s/s of visual fatigue, strain or increased concussion symptoms as a measure of improved visual tolerance required for IADL tasks, work, and driving tasks, on 50% of opportunities, within 90 days.   Goal Progress Gavi reports she has implemented  multiple visual modifications/adaptations into her daily life and states they have helped decrease her concussion symptoms compared to before. Goal Met.   Target Date 08/06/25   Date Met 07/22/25   OT Goal 3   Goal Identifier Symptom Management   Goal Description Patient will successfully identify and utilize 3 strategies (AE, adaptations/modifications, sensory accommodations, visual strategies, etc.) to decrease post-concussive symptoms for increased IND and participation with ADL/IADLs, home/community/work tasks, on 50% of opportunities, within 90 days.   Goal Progress Gavi reports she has implemented multiple symptom management strategies within her home/work environments. She states that she has noticed a decrease in concussion symptoms but they still remain. Goal Met.   Target Date 08/06/25   Date Met 07/22/25   OT Goal 4   Goal Identifier Cognition   Goal Description Patient will complete moderate level cognitive tasks with various demands (problem solving, functional calculations, divided/alternating attention, EF, error detection/correction) with no more than min assist x3 trials to increase problem solving skills, attention, and overall processing speed for improved performance in ADL, IADL, leisure, and work related tasks.   Goal Progress Gavi has demonstrated great engagement in cognitive activities during sessions this treatment period. She reports she has been able to implement multiple (internal and external) memory strategies she has learned during OP OT and that they have been effective. Goal Met.   Target Date 08/06/25   Date Met 07/22/25   Subjective Report   Subjective Report Discharge is being completed at this time due to a combination of good progress towards goal areas and it is Gavi's last scheduled OT appointment. Gavi was able to display good progress during this treatment period as she was able to achieve 4 out of 4 goals.     Kehinde Robles OTR/L  Occupational Therapist

## 2025-07-26 ENCOUNTER — HEALTH MAINTENANCE LETTER (OUTPATIENT)
Age: 50
End: 2025-07-26

## 2025-08-05 ENCOUNTER — OFFICE VISIT (OUTPATIENT)
Dept: FAMILY MEDICINE | Facility: OTHER | Age: 50
End: 2025-08-05
Payer: COMMERCIAL

## 2025-08-05 ENCOUNTER — ANCILLARY PROCEDURE (OUTPATIENT)
Dept: GENERAL RADIOLOGY | Facility: OTHER | Age: 50
End: 2025-08-05
Attending: PHYSICIAN ASSISTANT
Payer: COMMERCIAL

## 2025-08-05 VITALS
HEART RATE: 80 BPM | DIASTOLIC BLOOD PRESSURE: 86 MMHG | BODY MASS INDEX: 59.58 KG/M2 | TEMPERATURE: 97.8 F | OXYGEN SATURATION: 98 % | SYSTOLIC BLOOD PRESSURE: 118 MMHG | RESPIRATION RATE: 16 BRPM | WEIGHT: 293 LBS

## 2025-08-05 DIAGNOSIS — Z12.31 VISIT FOR SCREENING MAMMOGRAM: ICD-10-CM

## 2025-08-05 DIAGNOSIS — S09.90XA HEAD INJURY, INITIAL ENCOUNTER: Primary | ICD-10-CM

## 2025-08-05 DIAGNOSIS — Z12.4 CERVICAL CANCER SCREENING: ICD-10-CM

## 2025-08-05 DIAGNOSIS — E11.9 TYPE 2 DIABETES MELLITUS WITHOUT COMPLICATION, WITHOUT LONG-TERM CURRENT USE OF INSULIN (H): ICD-10-CM

## 2025-08-05 DIAGNOSIS — F07.81 POST CONCUSSION SYNDROME: ICD-10-CM

## 2025-08-05 DIAGNOSIS — R11.2 NAUSEA AND VOMITING, UNSPECIFIED VOMITING TYPE: ICD-10-CM

## 2025-08-05 DIAGNOSIS — R06.00 DYSPNEA, UNSPECIFIED TYPE: ICD-10-CM

## 2025-08-05 LAB
EST. AVERAGE GLUCOSE BLD GHB EST-MCNC: 186 MG/DL
HBA1C MFR BLD: 8.1 % (ref 0–5.6)

## 2025-08-05 PROCEDURE — 83036 HEMOGLOBIN GLYCOSYLATED A1C: CPT | Performed by: PHYSICIAN ASSISTANT

## 2025-08-05 PROCEDURE — 71046 X-RAY EXAM CHEST 2 VIEWS: CPT | Mod: TC | Performed by: RADIOLOGY

## 2025-08-05 PROCEDURE — 3079F DIAST BP 80-89 MM HG: CPT | Performed by: PHYSICIAN ASSISTANT

## 2025-08-05 PROCEDURE — 36415 COLL VENOUS BLD VENIPUNCTURE: CPT | Performed by: PHYSICIAN ASSISTANT

## 2025-08-05 PROCEDURE — 99214 OFFICE O/P EST MOD 30 MIN: CPT | Performed by: PHYSICIAN ASSISTANT

## 2025-08-05 PROCEDURE — 3074F SYST BP LT 130 MM HG: CPT | Performed by: PHYSICIAN ASSISTANT

## 2025-08-05 RX ORDER — ONDANSETRON 4 MG/1
2-4 TABLET, FILM COATED ORAL EVERY 8 HOURS PRN
Qty: 30 TABLET | Refills: 0 | Status: SHIPPED | OUTPATIENT
Start: 2025-08-05

## 2025-08-05 RX ORDER — GABAPENTIN 100 MG/1
200 CAPSULE ORAL AT BEDTIME
Qty: 60 CAPSULE | Refills: 2 | Status: SHIPPED | OUTPATIENT
Start: 2025-08-05

## 2025-08-05 ASSESSMENT — ENCOUNTER SYMPTOMS: EYE PAIN: 1

## 2025-08-06 ENCOUNTER — PATIENT OUTREACH (OUTPATIENT)
Dept: CARE COORDINATION | Facility: CLINIC | Age: 50
End: 2025-08-06
Payer: COMMERCIAL

## 2025-08-12 ENCOUNTER — MYC MEDICAL ADVICE (OUTPATIENT)
Dept: FAMILY MEDICINE | Facility: OTHER | Age: 50
End: 2025-08-12

## 2025-09-04 ENCOUNTER — PATIENT OUTREACH (OUTPATIENT)
Dept: CARE COORDINATION | Facility: CLINIC | Age: 50
End: 2025-09-04

## 2025-09-04 ENCOUNTER — HOSPITAL ENCOUNTER (EMERGENCY)
Facility: CLINIC | Age: 50
Discharge: HOME | End: 2025-09-04
Attending: FAMILY MEDICINE
Payer: COMMERCIAL

## 2025-09-04 VITALS
SYSTOLIC BLOOD PRESSURE: 113 MMHG | RESPIRATION RATE: 20 BRPM | WEIGHT: 293 LBS | BODY MASS INDEX: 63.62 KG/M2 | OXYGEN SATURATION: 92 % | DIASTOLIC BLOOD PRESSURE: 85 MMHG | TEMPERATURE: 97.9 F | HEART RATE: 78 BPM

## 2025-09-04 DIAGNOSIS — R12 HEARTBURN: ICD-10-CM

## 2025-09-04 DIAGNOSIS — G43.009 MIGRAINE WITHOUT AURA AND WITHOUT STATUS MIGRAINOSUS, NOT INTRACTABLE: ICD-10-CM

## 2025-09-04 DIAGNOSIS — M54.50 CHRONIC LOW BACK PAIN, UNSPECIFIED BACK PAIN LATERALITY, UNSPECIFIED WHETHER SCIATICA PRESENT: ICD-10-CM

## 2025-09-04 DIAGNOSIS — G89.29 CHRONIC LOW BACK PAIN, UNSPECIFIED BACK PAIN LATERALITY, UNSPECIFIED WHETHER SCIATICA PRESENT: ICD-10-CM

## 2025-09-04 DIAGNOSIS — R11.2 NAUSEA AND VOMITING, UNSPECIFIED VOMITING TYPE: ICD-10-CM

## 2025-09-04 DIAGNOSIS — R51.9 ACUTE INTRACTABLE HEADACHE, UNSPECIFIED HEADACHE TYPE: Primary | ICD-10-CM

## 2025-09-04 PROCEDURE — 250N000011 HC RX IP 250 OP 636: Performed by: FAMILY MEDICINE

## 2025-09-04 PROCEDURE — 96372 THER/PROPH/DIAG INJ SC/IM: CPT | Performed by: FAMILY MEDICINE

## 2025-09-04 PROCEDURE — 250N000013 HC RX MED GY IP 250 OP 250 PS 637: Performed by: FAMILY MEDICINE

## 2025-09-04 PROCEDURE — 258N000003 HC RX IP 258 OP 636: Performed by: FAMILY MEDICINE

## 2025-09-04 RX ORDER — CYCLOBENZAPRINE HCL 10 MG
10 TABLET ORAL 3 TIMES DAILY PRN
Qty: 90 TABLET | Refills: 0 | Status: SHIPPED | OUTPATIENT
Start: 2025-09-04

## 2025-09-04 RX ORDER — MAGNESIUM SULFATE 1 G/100ML
1 INJECTION INTRAVENOUS ONCE
Status: COMPLETED | OUTPATIENT
Start: 2025-09-04 | End: 2025-09-04

## 2025-09-04 RX ORDER — ONDANSETRON 4 MG/1
TABLET, FILM COATED ORAL
Qty: 30 TABLET | Refills: 0 | Status: SHIPPED | OUTPATIENT
Start: 2025-09-04

## 2025-09-04 RX ORDER — KETOROLAC TROMETHAMINE 15 MG/ML
15 INJECTION, SOLUTION INTRAMUSCULAR; INTRAVENOUS ONCE
Status: COMPLETED | OUTPATIENT
Start: 2025-09-04 | End: 2025-09-04

## 2025-09-04 RX ORDER — DIPHENHYDRAMINE HYDROCHLORIDE 50 MG/ML
25 INJECTION INTRAMUSCULAR; INTRAVENOUS ONCE
Status: COMPLETED | OUTPATIENT
Start: 2025-09-04 | End: 2025-09-04

## 2025-09-04 RX ORDER — OLANZAPINE 5 MG/1
10 TABLET, ORALLY DISINTEGRATING ORAL ONCE
Status: COMPLETED | OUTPATIENT
Start: 2025-09-04 | End: 2025-09-04

## 2025-09-04 RX ORDER — ORPHENADRINE CITRATE 30 MG/ML
60 INJECTION INTRAMUSCULAR; INTRAVENOUS ONCE
Status: COMPLETED | OUTPATIENT
Start: 2025-09-04 | End: 2025-09-04

## 2025-09-04 RX ORDER — DEXAMETHASONE SODIUM PHOSPHATE 10 MG/ML
10 INJECTION, SOLUTION INTRAMUSCULAR; INTRAVENOUS ONCE
Status: COMPLETED | OUTPATIENT
Start: 2025-09-04 | End: 2025-09-04

## 2025-09-04 RX ORDER — OMEPRAZOLE 40 MG/1
40 CAPSULE, DELAYED RELEASE ORAL DAILY
Qty: 15 CAPSULE | Refills: 0 | Status: SHIPPED | OUTPATIENT
Start: 2025-09-04 | End: 2025-09-19

## 2025-09-04 RX ORDER — METOCLOPRAMIDE HYDROCHLORIDE 5 MG/ML
10 INJECTION INTRAMUSCULAR; INTRAVENOUS ONCE
Status: COMPLETED | OUTPATIENT
Start: 2025-09-04 | End: 2025-09-04

## 2025-09-04 RX ADMIN — METOCLOPRAMIDE 10 MG: 5 INJECTION, SOLUTION INTRAMUSCULAR; INTRAVENOUS at 02:09

## 2025-09-04 RX ADMIN — ORPHENADRINE CITRATE 60 MG: 60 INJECTION INTRAMUSCULAR; INTRAVENOUS at 03:10

## 2025-09-04 RX ADMIN — KETOROLAC TROMETHAMINE 15 MG: 15 INJECTION, SOLUTION INTRAMUSCULAR; INTRAVENOUS at 02:07

## 2025-09-04 RX ADMIN — DEXAMETHASONE SODIUM PHOSPHATE 10 MG: 10 INJECTION, SOLUTION INTRAMUSCULAR; INTRAVENOUS at 02:05

## 2025-09-04 RX ADMIN — OLANZAPINE 10 MG: 5 TABLET, ORALLY DISINTEGRATING ORAL at 04:30

## 2025-09-04 RX ADMIN — DIPHENHYDRAMINE HYDROCHLORIDE 25 MG: 50 INJECTION, SOLUTION INTRAMUSCULAR; INTRAVENOUS at 02:03

## 2025-09-04 RX ADMIN — FAMOTIDINE 40 MG: 10 INJECTION, SOLUTION INTRAVENOUS at 01:58

## 2025-09-04 RX ADMIN — SODIUM CHLORIDE 1000 ML: 0.9 INJECTION, SOLUTION INTRAVENOUS at 01:55

## 2025-09-04 RX ADMIN — MAGNESIUM SULFATE HEPTAHYDRATE 1 G: 1 INJECTION, SOLUTION INTRAVENOUS at 03:18

## 2025-09-04 ASSESSMENT — ACTIVITIES OF DAILY LIVING (ADL)
ADLS_ACUITY_SCORE: 60

## 2025-09-04 ASSESSMENT — COLUMBIA-SUICIDE SEVERITY RATING SCALE - C-SSRS
1. IN THE PAST MONTH, HAVE YOU WISHED YOU WERE DEAD OR WISHED YOU COULD GO TO SLEEP AND NOT WAKE UP?: NO
6. HAVE YOU EVER DONE ANYTHING, STARTED TO DO ANYTHING, OR PREPARED TO DO ANYTHING TO END YOUR LIFE?: NO
2. HAVE YOU ACTUALLY HAD ANY THOUGHTS OF KILLING YOURSELF IN THE PAST MONTH?: NO

## (undated) DEVICE — NDL ECLIPSE 18GA 1.5"

## (undated) DEVICE — SPINOCAN

## (undated) DEVICE — GLOVE ESTEEM BLUE W/NEU-THERA 8.0  2D73PB80

## (undated) DEVICE — SYR 10ML LL W/O NDL

## (undated) DEVICE — SU ETHILON 4-0 PS-2 18" 1667G

## (undated) DEVICE — LUBRICATING JELLY 4.25OZ

## (undated) DEVICE — BNDG ESMARK 4" STERILE

## (undated) DEVICE — SYR 05ML LL W/O NDL

## (undated) DEVICE — GLOVE EXAM NITRILE LG

## (undated) DEVICE — DRSG GAUZE 2X2" 8042

## (undated) DEVICE — DRAPE U SPLIT 74X120" 29440

## (undated) DEVICE — SYR 03ML LL W/O NDL

## (undated) DEVICE — DRSG GAUZE 4X4" TRAY

## (undated) DEVICE — TUBING EXTENSION SET MICROBORE 21CM LL 6N8374

## (undated) DEVICE — SOL WATER IRRIG 1000ML BOTTLE 07139-09

## (undated) DEVICE — GLOVE PROTEXIS W/NEU-THERA 7.5  2D73TE75

## (undated) DEVICE — PREP CHLORAPREP ORANGE 3ML  260415

## (undated) DEVICE — TRAY PROCEDURE SUPPORT PAIN MANAGEMENT 332114

## (undated) DEVICE — SOL ISOPROPYL ALCOHOL USP 70% 16OZ  NDC10565-002-16 D0022

## (undated) DEVICE — PACK HAND WRIST FOREARM CUSTOM

## (undated) DEVICE — SYR EAR BULB 2OZ

## (undated) DEVICE — NDL COUNTER 10CT

## (undated) DEVICE — KIT ENDO TURNOVER/PROCEDURE CARRY-ON 101822

## (undated) DEVICE — TUBING SUCTION 12"X1/4" N612

## (undated) DEVICE — NDL SPINAL 22GA 7" QUINCKE 405149

## (undated) RX ORDER — PROPOFOL 10 MG/ML
INJECTION, EMULSION INTRAVENOUS
Status: DISPENSED
Start: 2018-12-24

## (undated) RX ORDER — FENTANYL CITRATE 50 UG/ML
INJECTION, SOLUTION INTRAMUSCULAR; INTRAVENOUS
Status: DISPENSED
Start: 2018-12-24

## (undated) RX ORDER — ONDANSETRON 2 MG/ML
INJECTION INTRAMUSCULAR; INTRAVENOUS
Status: DISPENSED
Start: 2018-12-24

## (undated) RX ORDER — LIDOCAINE HYDROCHLORIDE 20 MG/ML
INJECTION, SOLUTION EPIDURAL; INFILTRATION; INTRACAUDAL; PERINEURAL
Status: DISPENSED
Start: 2018-12-24